# Patient Record
Sex: MALE | Race: WHITE | NOT HISPANIC OR LATINO | Employment: FULL TIME | URBAN - METROPOLITAN AREA
[De-identification: names, ages, dates, MRNs, and addresses within clinical notes are randomized per-mention and may not be internally consistent; named-entity substitution may affect disease eponyms.]

---

## 2017-01-16 ENCOUNTER — HOSPITAL ENCOUNTER (OUTPATIENT)
Dept: LAB | Facility: MEDICAL CENTER | Age: 52
End: 2017-01-16
Attending: NURSE PRACTITIONER
Payer: COMMERCIAL

## 2017-01-16 LAB
ALBUMIN SERPL BCP-MCNC: 4.5 G/DL (ref 3.2–4.9)
ALBUMIN/GLOB SERPL: 1.3 G/DL
ALP SERPL-CCNC: 67 U/L (ref 30–99)
ALT SERPL-CCNC: 198 U/L (ref 2–50)
ANION GAP SERPL CALC-SCNC: 5 MMOL/L (ref 0–11.9)
AST SERPL-CCNC: 107 U/L (ref 12–45)
BASOPHILS # BLD AUTO: 0.05 K/UL (ref 0–0.12)
BASOPHILS NFR BLD AUTO: 0.6 % (ref 0–1.8)
BILIRUB SERPL-MCNC: 0.4 MG/DL (ref 0.1–1.5)
BUN SERPL-MCNC: 15 MG/DL (ref 8–22)
CALCIUM SERPL-MCNC: 9.7 MG/DL (ref 8.5–10.5)
CHLORIDE SERPL-SCNC: 105 MMOL/L (ref 96–112)
CHOLEST SERPL-MCNC: 235 MG/DL (ref 100–199)
CO2 SERPL-SCNC: 29 MMOL/L (ref 20–33)
CREAT SERPL-MCNC: 0.97 MG/DL (ref 0.5–1.4)
CRP SERPL HS-MCNC: 12.2 MG/L (ref 0–7.5)
EOSINOPHIL # BLD: 0.15 K/UL (ref 0–0.51)
EOSINOPHIL NFR BLD AUTO: 1.9 % (ref 0–6.9)
ERYTHROCYTE [DISTWIDTH] IN BLOOD BY AUTOMATED COUNT: 43 FL (ref 35.9–50)
GLOBULIN SER CALC-MCNC: 3.4 G/DL (ref 1.9–3.5)
GLUCOSE SERPL-MCNC: 160 MG/DL (ref 65–99)
HCT VFR BLD AUTO: 49.6 % (ref 42–52)
HDLC SERPL-MCNC: 48 MG/DL
HGB BLD-MCNC: 16.3 G/DL (ref 14–18)
IMM GRANULOCYTES # BLD AUTO: 0.03 K/UL (ref 0–0.11)
IMM GRANULOCYTES NFR BLD AUTO: 0.4 % (ref 0–0.9)
LDLC SERPL CALC-MCNC: 161 MG/DL
LYMPHOCYTES # BLD: 1.65 K/UL (ref 1–4.8)
LYMPHOCYTES NFR BLD AUTO: 20.4 % (ref 22–41)
MCH RBC QN AUTO: 30.5 PG (ref 27–33)
MCHC RBC AUTO-ENTMCNC: 32.9 G/DL (ref 33.7–35.3)
MCV RBC AUTO: 92.7 FL (ref 81.4–97.8)
MONOCYTES # BLD: 0.63 K/UL (ref 0–0.85)
MONOCYTES NFR BLD AUTO: 7.8 % (ref 0–13.4)
NEUTROPHILS # BLD: 5.56 K/UL (ref 1.82–7.42)
NEUTROPHILS NFR BLD AUTO: 68.9 % (ref 44–72)
NRBC # BLD AUTO: 0 K/UL
NRBC BLD-RTO: 0 /100 WBC
PLATELET # BLD AUTO: 246 K/UL (ref 164–446)
PMV BLD AUTO: 11 FL (ref 9–12.9)
POTASSIUM SERPL-SCNC: 4.7 MMOL/L (ref 3.6–5.5)
PROT SERPL-MCNC: 7.9 G/DL (ref 6–8.2)
PSA SERPL DL<=0.01 NG/ML-MCNC: 0.43 NG/ML (ref 0–4)
RBC # BLD AUTO: 5.35 M/UL (ref 4.7–6.1)
SODIUM SERPL-SCNC: 139 MMOL/L (ref 135–145)
TRIGL SERPL-MCNC: 131 MG/DL (ref 0–149)
WBC # BLD AUTO: 8.1 K/UL (ref 4.8–10.8)

## 2017-01-16 PROCEDURE — 86141 C-REACTIVE PROTEIN HS: CPT

## 2017-01-16 PROCEDURE — 80061 LIPID PANEL: CPT

## 2017-01-16 PROCEDURE — 80053 COMPREHEN METABOLIC PANEL: CPT

## 2017-01-16 PROCEDURE — 84153 ASSAY OF PSA TOTAL: CPT

## 2017-01-16 PROCEDURE — 85025 COMPLETE CBC W/AUTO DIFF WBC: CPT

## 2017-01-16 PROCEDURE — 36415 COLL VENOUS BLD VENIPUNCTURE: CPT

## 2017-02-13 ENCOUNTER — HOSPITAL ENCOUNTER (OUTPATIENT)
Dept: RADIOLOGY | Facility: MEDICAL CENTER | Age: 52
End: 2017-02-13
Attending: NURSE PRACTITIONER
Payer: COMMERCIAL

## 2017-02-13 DIAGNOSIS — R74.8 ELEVATED LIVER ENZYMES: ICD-10-CM

## 2017-02-13 PROCEDURE — 76700 US EXAM ABDOM COMPLETE: CPT

## 2017-02-15 ENCOUNTER — APPOINTMENT (OUTPATIENT)
Dept: SLEEP MEDICINE | Facility: MEDICAL CENTER | Age: 52
End: 2017-02-15
Payer: COMMERCIAL

## 2017-04-10 ENCOUNTER — APPOINTMENT (OUTPATIENT)
Dept: SLEEP MEDICINE | Facility: MEDICAL CENTER | Age: 52
End: 2017-04-10
Payer: COMMERCIAL

## 2017-04-27 ENCOUNTER — HOSPITAL ENCOUNTER (OUTPATIENT)
Dept: LAB | Facility: MEDICAL CENTER | Age: 52
End: 2017-04-27
Attending: NURSE PRACTITIONER
Payer: COMMERCIAL

## 2017-04-27 LAB
ALBUMIN SERPL BCP-MCNC: 4.4 G/DL (ref 3.2–4.9)
ALBUMIN/GLOB SERPL: 1.5 G/DL
ALP SERPL-CCNC: 62 U/L (ref 30–99)
ALT SERPL-CCNC: 26 U/L (ref 2–50)
ANION GAP SERPL CALC-SCNC: 9 MMOL/L (ref 0–11.9)
AST SERPL-CCNC: 20 U/L (ref 12–45)
BILIRUB SERPL-MCNC: 0.5 MG/DL (ref 0.1–1.5)
BUN SERPL-MCNC: 15 MG/DL (ref 8–22)
CALCIUM SERPL-MCNC: 9.5 MG/DL (ref 8.5–10.5)
CHLORIDE SERPL-SCNC: 105 MMOL/L (ref 96–112)
CHOLEST SERPL-MCNC: 167 MG/DL (ref 100–199)
CO2 SERPL-SCNC: 25 MMOL/L (ref 20–33)
CREAT SERPL-MCNC: 0.94 MG/DL (ref 0.5–1.4)
CRP SERPL HS-MCNC: 6.7 MG/L (ref 0–7.5)
GFR SERPL CREATININE-BSD FRML MDRD: >60 ML/MIN/1.73 M 2
GLOBULIN SER CALC-MCNC: 3 G/DL (ref 1.9–3.5)
GLUCOSE SERPL-MCNC: 84 MG/DL (ref 65–99)
HDLC SERPL-MCNC: 41 MG/DL
LDLC SERPL CALC-MCNC: 105 MG/DL
POTASSIUM SERPL-SCNC: 4.3 MMOL/L (ref 3.6–5.5)
PROT SERPL-MCNC: 7.4 G/DL (ref 6–8.2)
SODIUM SERPL-SCNC: 139 MMOL/L (ref 135–145)
TRIGL SERPL-MCNC: 104 MG/DL (ref 0–149)

## 2017-04-27 PROCEDURE — 80053 COMPREHEN METABOLIC PANEL: CPT

## 2017-04-27 PROCEDURE — 86141 C-REACTIVE PROTEIN HS: CPT

## 2017-04-27 PROCEDURE — 36415 COLL VENOUS BLD VENIPUNCTURE: CPT

## 2017-04-27 PROCEDURE — 80061 LIPID PANEL: CPT

## 2018-11-20 ENCOUNTER — APPOINTMENT (RX ONLY)
Dept: URBAN - METROPOLITAN AREA CLINIC 22 | Facility: CLINIC | Age: 53
Setting detail: DERMATOLOGY
End: 2018-11-20

## 2018-11-20 DIAGNOSIS — D18.0 HEMANGIOMA: ICD-10-CM

## 2018-11-20 DIAGNOSIS — Q819 OTHER SPECIFIED ANOMALIES OF SKIN: ICD-10-CM

## 2018-11-20 DIAGNOSIS — L81.4 OTHER MELANIN HYPERPIGMENTATION: ICD-10-CM

## 2018-11-20 DIAGNOSIS — Z71.89 OTHER SPECIFIED COUNSELING: ICD-10-CM

## 2018-11-20 DIAGNOSIS — Q828 OTHER SPECIFIED ANOMALIES OF SKIN: ICD-10-CM

## 2018-11-20 DIAGNOSIS — L91.8 OTHER HYPERTROPHIC DISORDERS OF THE SKIN: ICD-10-CM

## 2018-11-20 DIAGNOSIS — D22 MELANOCYTIC NEVI: ICD-10-CM

## 2018-11-20 DIAGNOSIS — Q826 OTHER SPECIFIED ANOMALIES OF SKIN: ICD-10-CM

## 2018-11-20 DIAGNOSIS — L82.1 OTHER SEBORRHEIC KERATOSIS: ICD-10-CM

## 2018-11-20 PROBLEM — D22.39 MELANOCYTIC NEVI OF OTHER PARTS OF FACE: Status: ACTIVE | Noted: 2018-11-20

## 2018-11-20 PROBLEM — Q82.8 OTHER SPECIFIED CONGENITAL MALFORMATIONS OF SKIN: Status: ACTIVE | Noted: 2018-11-20

## 2018-11-20 PROBLEM — D22.5 MELANOCYTIC NEVI OF TRUNK: Status: ACTIVE | Noted: 2018-11-20

## 2018-11-20 PROBLEM — D48.5 NEOPLASM OF UNCERTAIN BEHAVIOR OF SKIN: Status: ACTIVE | Noted: 2018-11-20

## 2018-11-20 PROBLEM — D22.61 MELANOCYTIC NEVI OF RIGHT UPPER LIMB, INCLUDING SHOULDER: Status: ACTIVE | Noted: 2018-11-20

## 2018-11-20 PROBLEM — D18.01 HEMANGIOMA OF SKIN AND SUBCUTANEOUS TISSUE: Status: ACTIVE | Noted: 2018-11-20

## 2018-11-20 PROBLEM — D22.62 MELANOCYTIC NEVI OF LEFT UPPER LIMB, INCLUDING SHOULDER: Status: ACTIVE | Noted: 2018-11-20

## 2018-11-20 PROCEDURE — 11100: CPT

## 2018-11-20 PROCEDURE — ? BIOPSY BY SHAVE METHOD

## 2018-11-20 PROCEDURE — ? COUNSELING

## 2018-11-20 PROCEDURE — 99214 OFFICE O/P EST MOD 30 MIN: CPT | Mod: 25

## 2018-11-20 PROCEDURE — ? TREATMENT REGIMEN

## 2018-11-20 ASSESSMENT — LOCATION DETAILED DESCRIPTION DERM
LOCATION DETAILED: RIGHT MID-UPPER BACK
LOCATION DETAILED: RIGHT INFERIOR LATERAL UPPER BACK
LOCATION DETAILED: LEFT CENTRAL MALAR CHEEK
LOCATION DETAILED: LEFT DISTAL POSTERIOR THIGH
LOCATION DETAILED: LEFT PROXIMAL POSTERIOR UPPER ARM
LOCATION DETAILED: LEFT INFERIOR MEDIAL MIDBACK
LOCATION DETAILED: RIGHT MEDIAL UPPER BACK
LOCATION DETAILED: RIGHT DISTAL POSTERIOR UPPER ARM
LOCATION DETAILED: LEFT MEDIAL INFERIOR CHEST
LOCATION DETAILED: RIGHT SUPERIOR MEDIAL UPPER BACK
LOCATION DETAILED: RIGHT PROXIMAL POSTERIOR UPPER ARM
LOCATION DETAILED: LEFT DISTAL POSTERIOR UPPER ARM
LOCATION DETAILED: RIGHT SUPERIOR ANTERIOR NECK
LOCATION DETAILED: LEFT INFERIOR LATERAL UPPER BACK
LOCATION DETAILED: RIGHT DISTAL POSTERIOR THIGH
LOCATION DETAILED: LEFT INFERIOR MEDIAL FOREHEAD

## 2018-11-20 ASSESSMENT — LOCATION SIMPLE DESCRIPTION DERM
LOCATION SIMPLE: CHEST
LOCATION SIMPLE: LEFT FOREHEAD
LOCATION SIMPLE: LEFT LOWER BACK
LOCATION SIMPLE: LEFT UPPER ARM
LOCATION SIMPLE: LEFT CHEEK
LOCATION SIMPLE: RIGHT UPPER ARM
LOCATION SIMPLE: RIGHT POSTERIOR THIGH
LOCATION SIMPLE: LEFT POSTERIOR THIGH
LOCATION SIMPLE: RIGHT UPPER BACK
LOCATION SIMPLE: LEFT UPPER BACK
LOCATION SIMPLE: RIGHT ANTERIOR NECK

## 2018-11-20 ASSESSMENT — LOCATION ZONE DERM
LOCATION ZONE: ARM
LOCATION ZONE: LEG
LOCATION ZONE: FACE
LOCATION ZONE: TRUNK
LOCATION ZONE: NECK

## 2018-11-20 NOTE — PROCEDURE: BIOPSY BY SHAVE METHOD
X Size Of Lesion In Cm: 0
Bill 10727 For Specimen Handling/Conveyance To Laboratory?: no
Electrodesiccation Text: The wound bed was treated with electrodesiccation after the biopsy was performed.
Depth Of Biopsy: dermis
Biopsy Type: H and E
Wound Care: Vaseline
Render Post-Care Instructions In Note?: yes
Anesthesia Volume In Cc: 1
Dressing: bandage
Type Of Destruction Used: Curettage
Electrodesiccation And Curettage Text: The wound bed was treated with electrodesiccation and curettage after the biopsy was performed.
Billing Type: Third-Party Bill
Detail Level: Detailed
Biopsy Method: Personna blade
Curettage Text: The wound bed was treated with curettage after the biopsy was performed.
Notification Instructions: Patient will be notified of biopsy results. However, patient instructed to call the office if not contacted within 2 weeks.
Silver Nitrate Text: The wound bed was treated with silver nitrate after the biopsy was performed.
Post-Care Instructions: I reviewed with the patient in detail post-care instructions. Patient is to keep the biopsy site dry overnight, and then apply bacitracin twice daily until healed. Patient may apply hydrogen peroxide soaks to remove any crusting.
Hemostasis: Drysol
Cryotherapy Text: The wound bed was treated with cryotherapy after the biopsy was performed.
Anesthesia Type: 1% lidocaine with 1:100,000 epinephrine
Lab Facility: 
Consent: Written consent was obtained and risks were reviewed including but not limited to scarring, infection, bleeding, scabbing, incomplete removal, nerve damage and allergy to anesthesia.
Lab: [id number]

## 2020-11-18 ENCOUNTER — HOSPITAL ENCOUNTER (OUTPATIENT)
Dept: LAB | Facility: MEDICAL CENTER | Age: 55
End: 2020-11-18
Attending: FAMILY MEDICINE
Payer: COMMERCIAL

## 2020-11-18 PROCEDURE — C9803 HOPD COVID-19 SPEC COLLECT: HCPCS

## 2020-11-18 PROCEDURE — U0003 INFECTIOUS AGENT DETECTION BY NUCLEIC ACID (DNA OR RNA); SEVERE ACUTE RESPIRATORY SYNDROME CORONAVIRUS 2 (SARS-COV-2) (CORONAVIRUS DISEASE [COVID-19]), AMPLIFIED PROBE TECHNIQUE, MAKING USE OF HIGH THROUGHPUT TECHNOLOGIES AS DESCRIBED BY CMS-2020-01-R: HCPCS

## 2020-11-19 LAB
COVID ORDER STATUS COVID19: NORMAL
SARS-COV-2 RNA RESP QL NAA+PROBE: DETECTED
SPECIMEN SOURCE: ABNORMAL

## 2021-12-02 ENCOUNTER — OFFICE VISIT (OUTPATIENT)
Dept: URGENT CARE | Facility: PHYSICIAN GROUP | Age: 56
End: 2021-12-02
Payer: COMMERCIAL

## 2021-12-02 VITALS
BODY MASS INDEX: 28.99 KG/M2 | RESPIRATION RATE: 16 BRPM | WEIGHT: 214 LBS | HEIGHT: 72 IN | OXYGEN SATURATION: 95 % | DIASTOLIC BLOOD PRESSURE: 92 MMHG | SYSTOLIC BLOOD PRESSURE: 164 MMHG | HEART RATE: 99 BPM | TEMPERATURE: 98.1 F

## 2021-12-02 DIAGNOSIS — R04.0 EPISTAXIS: ICD-10-CM

## 2021-12-02 DIAGNOSIS — R03.0 ELEVATED BLOOD PRESSURE READING WITHOUT DIAGNOSIS OF HYPERTENSION: ICD-10-CM

## 2021-12-02 PROCEDURE — 99203 OFFICE O/P NEW LOW 30 MIN: CPT | Performed by: NURSE PRACTITIONER

## 2021-12-02 ASSESSMENT — ENCOUNTER SYMPTOMS
HEADACHES: 0
CHILLS: 0
DIZZINESS: 0
MYALGIAS: 0
FEVER: 0
NAUSEA: 0

## 2021-12-03 NOTE — PROGRESS NOTES
Subjective     Harjit Pantoja is a 56 y.o. male who presents with Epistaxis (Four bloddy noses in the last 24 hours. Pt sts his wife recently switched over to Alkolol. )            HPI   New problem.  Patient is a 56-year-old male presents with for bloody noses in the past 24 hours.  He currently does not have any active bleeding.  He denies nasal congestion, runny nose, headache, or dizziness.  He reports that it only takes a couple of minutes to stop the bleeding.  He is just recently switched to a different nasal spray that has all-natural ingredients to include essential oils.    Cleocin [clindamycin hcl] and Pcn [penicillins]  No current outpatient medications on file prior to visit.     No current facility-administered medications on file prior to visit.     Social History     Socioeconomic History   • Marital status:      Spouse name: Not on file   • Number of children: Not on file   • Years of education: Not on file   • Highest education level: Not on file   Occupational History   • Not on file   Tobacco Use   • Smoking status: Current Every Day Smoker     Packs/day: 1.00     Years: 10.00     Pack years: 10.00     Types: Cigarettes   • Smokeless tobacco: Current User   Vaping Use   • Vaping Use: Former   • Quit date: 12/2/2019   Substance and Sexual Activity   • Alcohol use: Yes     Alcohol/week: 2.4 oz     Types: 4 Standard drinks or equivalent per week   • Drug use: Yes     Types: Marijuana, Oral     Comment: one every two weeks.    • Sexual activity: Not on file   Other Topics Concern   • Not on file   Social History Narrative   • Not on file     Social Determinants of Health     Financial Resource Strain:    • Difficulty of Paying Living Expenses: Not on file   Food Insecurity:    • Worried About Running Out of Food in the Last Year: Not on file   • Ran Out of Food in the Last Year: Not on file   Transportation Needs:    • Lack of Transportation (Medical): Not on file   • Lack of  Transportation (Non-Medical): Not on file   Physical Activity:    • Days of Exercise per Week: Not on file   • Minutes of Exercise per Session: Not on file   Stress:    • Feeling of Stress : Not on file   Social Connections:    • Frequency of Communication with Friends and Family: Not on file   • Frequency of Social Gatherings with Friends and Family: Not on file   • Attends [employer] Services: Not on file   • Active Member of Clubs or Organizations: Not on file   • Attends Club or Organization Meetings: Not on file   • Marital Status: Not on file   Intimate Partner Violence:    • Fear of Current or Ex-Partner: Not on file   • Emotionally Abused: Not on file   • Physically Abused: Not on file   • Sexually Abused: Not on file   Housing Stability:    • Unable to Pay for Housing in the Last Year: Not on file   • Number of Places Lived in the Last Year: Not on file   • Unstable Housing in the Last Year: Not on file     Breast Cancer-related family history is not on file.      Review of Systems   Constitutional: Negative for chills and fever.   HENT: Positive for nosebleeds. Negative for congestion.    Gastrointestinal: Negative for nausea.   Musculoskeletal: Negative for myalgias.   Neurological: Negative for dizziness and headaches.              Objective     BP (!) 164/92 (BP Location: Left arm, Patient Position: Sitting, BP Cuff Size: Large adult)   Pulse 99   Temp 36.7 °C (98.1 °F) (Temporal)   Resp 16   Ht 1.829 m (6')   Wt 97.1 kg (214 lb)   SpO2 95%   BMI 29.02 kg/m²      Physical Exam  Vitals and nursing note reviewed.   Constitutional:       General: He is not in acute distress.     Appearance: He is well-developed.   HENT:      Head: Normocephalic and atraumatic.      Right Ear: Ear canal and external ear normal. No middle ear effusion. Tympanic membrane is not injected or perforated.      Left Ear: Ear canal and external ear normal.  No middle ear effusion. Tympanic membrane is not injected or  perforated.      Nose: Mucosal edema present.      Comments: Patient with bloody discharge in the right nares  Eyes:      General:         Right eye: No discharge.         Left eye: No discharge.      Conjunctiva/sclera: Conjunctivae normal.   Cardiovascular:      Rate and Rhythm: Normal rate and regular rhythm.      Heart sounds: Normal heart sounds. No murmur heard.      Pulmonary:      Effort: Pulmonary effort is normal. No respiratory distress.      Breath sounds: Normal breath sounds.   Chest:   Breasts:      Right: No supraclavicular adenopathy.      Left: No supraclavicular adenopathy.       Musculoskeletal:         General: Normal range of motion.      Cervical back: Normal range of motion and neck supple.      Comments: Normal movement of all 4 extremities.   Lymphadenopathy:      Cervical: No cervical adenopathy.      Upper Body:      Right upper body: No supraclavicular adenopathy.      Left upper body: No supraclavicular adenopathy.   Skin:     General: Skin is warm and dry.   Neurological:      Mental Status: He is alert and oriented to person, place, and time.      Gait: Gait normal.   Psychiatric:         Behavior: Behavior normal.         Thought Content: Thought content normal.                             Assessment & Plan         1. Epistaxis  Referral to ENT   2. Elevated blood pressure reading without diagnosis of hypertension         Advised patient to stop the new nasal spray to see if this makes a difference.  He is given a referral to ENT in case he has something that needs to be cauterized.  He is counseled to watch his blood pressure as he is noted to have elevation at 164/92 today in the clinic.

## 2022-06-09 ENCOUNTER — HOSPITAL ENCOUNTER (OUTPATIENT)
Dept: LAB | Facility: MEDICAL CENTER | Age: 57
End: 2022-06-09
Attending: STUDENT IN AN ORGANIZED HEALTH CARE EDUCATION/TRAINING PROGRAM
Payer: COMMERCIAL

## 2022-06-09 LAB
25(OH)D3 SERPL-MCNC: 28 NG/ML (ref 30–100)
ALBUMIN SERPL BCP-MCNC: 4.7 G/DL (ref 3.2–4.9)
ALBUMIN/GLOB SERPL: 1.8 G/DL
ALP SERPL-CCNC: 67 U/L (ref 30–99)
ALT SERPL-CCNC: 21 U/L (ref 2–50)
ANION GAP SERPL CALC-SCNC: 13 MMOL/L (ref 7–16)
AST SERPL-CCNC: 18 U/L (ref 12–45)
BASOPHILS # BLD AUTO: 0.4 % (ref 0–1.8)
BASOPHILS # BLD: 0.03 K/UL (ref 0–0.12)
BILIRUB SERPL-MCNC: 0.3 MG/DL (ref 0.1–1.5)
BUN SERPL-MCNC: 15 MG/DL (ref 8–22)
CALCIUM SERPL-MCNC: 9.9 MG/DL (ref 8.5–10.5)
CHLORIDE SERPL-SCNC: 103 MMOL/L (ref 96–112)
CHOLEST SERPL-MCNC: 124 MG/DL (ref 100–199)
CO2 SERPL-SCNC: 24 MMOL/L (ref 20–33)
CREAT SERPL-MCNC: 0.81 MG/DL (ref 0.5–1.4)
EOSINOPHIL # BLD AUTO: 0.06 K/UL (ref 0–0.51)
EOSINOPHIL NFR BLD: 0.7 % (ref 0–6.9)
ERYTHROCYTE [DISTWIDTH] IN BLOOD BY AUTOMATED COUNT: 43.3 FL (ref 35.9–50)
EST. AVERAGE GLUCOSE BLD GHB EST-MCNC: 157 MG/DL
FASTING STATUS PATIENT QL REPORTED: NORMAL
GFR SERPLBLD CREATININE-BSD FMLA CKD-EPI: 103 ML/MIN/1.73 M 2
GLOBULIN SER CALC-MCNC: 2.6 G/DL (ref 1.9–3.5)
GLUCOSE SERPL-MCNC: 125 MG/DL (ref 65–99)
HBA1C MFR BLD: 7.1 % (ref 4–5.6)
HCT VFR BLD AUTO: 48.7 % (ref 42–52)
HDLC SERPL-MCNC: 70 MG/DL
HGB BLD-MCNC: 17 G/DL (ref 14–18)
IMM GRANULOCYTES # BLD AUTO: 0.03 K/UL (ref 0–0.11)
IMM GRANULOCYTES NFR BLD AUTO: 0.4 % (ref 0–0.9)
LDLC SERPL CALC-MCNC: 45 MG/DL
LYMPHOCYTES # BLD AUTO: 1.44 K/UL (ref 1–4.8)
LYMPHOCYTES NFR BLD: 17.1 % (ref 22–41)
MCH RBC QN AUTO: 32 PG (ref 27–33)
MCHC RBC AUTO-ENTMCNC: 34.9 G/DL (ref 33.7–35.3)
MCV RBC AUTO: 91.5 FL (ref 81.4–97.8)
MONOCYTES # BLD AUTO: 0.52 K/UL (ref 0–0.85)
MONOCYTES NFR BLD AUTO: 6.2 % (ref 0–13.4)
NEUTROPHILS # BLD AUTO: 6.32 K/UL (ref 1.82–7.42)
NEUTROPHILS NFR BLD: 75.2 % (ref 44–72)
NRBC # BLD AUTO: 0 K/UL
NRBC BLD-RTO: 0 /100 WBC
PLATELET # BLD AUTO: 198 K/UL (ref 164–446)
PMV BLD AUTO: 10.5 FL (ref 9–12.9)
POTASSIUM SERPL-SCNC: 4.5 MMOL/L (ref 3.6–5.5)
PROT SERPL-MCNC: 7.3 G/DL (ref 6–8.2)
RBC # BLD AUTO: 5.32 M/UL (ref 4.7–6.1)
SODIUM SERPL-SCNC: 140 MMOL/L (ref 135–145)
TRIGL SERPL-MCNC: 43 MG/DL (ref 0–149)
WBC # BLD AUTO: 8.4 K/UL (ref 4.8–10.8)

## 2022-06-09 PROCEDURE — 80061 LIPID PANEL: CPT

## 2022-06-09 PROCEDURE — 85025 COMPLETE CBC W/AUTO DIFF WBC: CPT

## 2022-06-09 PROCEDURE — 82306 VITAMIN D 25 HYDROXY: CPT

## 2022-06-09 PROCEDURE — 83036 HEMOGLOBIN GLYCOSYLATED A1C: CPT

## 2022-06-09 PROCEDURE — 80053 COMPREHEN METABOLIC PANEL: CPT

## 2022-06-09 PROCEDURE — 36415 COLL VENOUS BLD VENIPUNCTURE: CPT

## 2022-12-08 ENCOUNTER — HOSPITAL ENCOUNTER (OUTPATIENT)
Dept: LAB | Facility: MEDICAL CENTER | Age: 57
End: 2022-12-08
Attending: STUDENT IN AN ORGANIZED HEALTH CARE EDUCATION/TRAINING PROGRAM
Payer: COMMERCIAL

## 2022-12-08 LAB
25(OH)D3 SERPL-MCNC: 41 NG/ML (ref 30–100)
ALBUMIN SERPL BCP-MCNC: 4.9 G/DL (ref 3.2–4.9)
ALBUMIN/GLOB SERPL: 1.8 G/DL
ALP SERPL-CCNC: 68 U/L (ref 30–99)
ALT SERPL-CCNC: 27 U/L (ref 2–50)
ANION GAP SERPL CALC-SCNC: 12 MMOL/L (ref 7–16)
AST SERPL-CCNC: 19 U/L (ref 12–45)
BASOPHILS # BLD AUTO: 0.5 % (ref 0–1.8)
BASOPHILS # BLD: 0.03 K/UL (ref 0–0.12)
BILIRUB SERPL-MCNC: 0.5 MG/DL (ref 0.1–1.5)
BUN SERPL-MCNC: 14 MG/DL (ref 8–22)
CALCIUM SERPL-MCNC: 9.7 MG/DL (ref 8.5–10.5)
CHLORIDE SERPL-SCNC: 103 MMOL/L (ref 96–112)
CHOLEST SERPL-MCNC: 116 MG/DL (ref 100–199)
CO2 SERPL-SCNC: 26 MMOL/L (ref 20–33)
CREAT SERPL-MCNC: 0.8 MG/DL (ref 0.5–1.4)
EOSINOPHIL # BLD AUTO: 0.04 K/UL (ref 0–0.51)
EOSINOPHIL NFR BLD: 0.6 % (ref 0–6.9)
ERYTHROCYTE [DISTWIDTH] IN BLOOD BY AUTOMATED COUNT: 44.6 FL (ref 35.9–50)
EST. AVERAGE GLUCOSE BLD GHB EST-MCNC: 123 MG/DL
FASTING STATUS PATIENT QL REPORTED: NORMAL
GFR SERPLBLD CREATININE-BSD FMLA CKD-EPI: 103 ML/MIN/1.73 M 2
GLOBULIN SER CALC-MCNC: 2.7 G/DL (ref 1.9–3.5)
GLUCOSE SERPL-MCNC: 106 MG/DL (ref 65–99)
HBA1C MFR BLD: 5.9 % (ref 4–5.6)
HCT VFR BLD AUTO: 47.9 % (ref 42–52)
HDLC SERPL-MCNC: 69 MG/DL
HGB BLD-MCNC: 16.4 G/DL (ref 14–18)
IMM GRANULOCYTES # BLD AUTO: 0.02 K/UL (ref 0–0.11)
IMM GRANULOCYTES NFR BLD AUTO: 0.3 % (ref 0–0.9)
LDLC SERPL CALC-MCNC: 35 MG/DL
LYMPHOCYTES # BLD AUTO: 1.64 K/UL (ref 1–4.8)
LYMPHOCYTES NFR BLD: 25 % (ref 22–41)
MCH RBC QN AUTO: 31.9 PG (ref 27–33)
MCHC RBC AUTO-ENTMCNC: 34.2 G/DL (ref 33.7–35.3)
MCV RBC AUTO: 93.2 FL (ref 81.4–97.8)
MONOCYTES # BLD AUTO: 0.66 K/UL (ref 0–0.85)
MONOCYTES NFR BLD AUTO: 10.1 % (ref 0–13.4)
NEUTROPHILS # BLD AUTO: 4.17 K/UL (ref 1.82–7.42)
NEUTROPHILS NFR BLD: 63.5 % (ref 44–72)
NRBC # BLD AUTO: 0 K/UL
NRBC BLD-RTO: 0 /100 WBC
PLATELET # BLD AUTO: 201 K/UL (ref 164–446)
PMV BLD AUTO: 10.6 FL (ref 9–12.9)
POTASSIUM SERPL-SCNC: 4.4 MMOL/L (ref 3.6–5.5)
PROT SERPL-MCNC: 7.6 G/DL (ref 6–8.2)
RBC # BLD AUTO: 5.14 M/UL (ref 4.7–6.1)
SODIUM SERPL-SCNC: 141 MMOL/L (ref 135–145)
TRIGL SERPL-MCNC: 58 MG/DL (ref 0–149)
WBC # BLD AUTO: 6.6 K/UL (ref 4.8–10.8)

## 2022-12-08 PROCEDURE — 80053 COMPREHEN METABOLIC PANEL: CPT

## 2022-12-08 PROCEDURE — 85025 COMPLETE CBC W/AUTO DIFF WBC: CPT

## 2022-12-08 PROCEDURE — 80061 LIPID PANEL: CPT

## 2022-12-08 PROCEDURE — 82306 VITAMIN D 25 HYDROXY: CPT

## 2022-12-08 PROCEDURE — 83036 HEMOGLOBIN GLYCOSYLATED A1C: CPT

## 2022-12-08 PROCEDURE — 36415 COLL VENOUS BLD VENIPUNCTURE: CPT

## 2023-10-16 ENCOUNTER — APPOINTMENT (OUTPATIENT)
Dept: RADIOLOGY | Facility: MEDICAL CENTER | Age: 58
DRG: 291 | End: 2023-10-16
Attending: STUDENT IN AN ORGANIZED HEALTH CARE EDUCATION/TRAINING PROGRAM
Payer: COMMERCIAL

## 2023-10-16 ENCOUNTER — HOSPITAL ENCOUNTER (INPATIENT)
Facility: MEDICAL CENTER | Age: 58
LOS: 6 days | DRG: 291 | End: 2023-10-22
Attending: STUDENT IN AN ORGANIZED HEALTH CARE EDUCATION/TRAINING PROGRAM | Admitting: FAMILY MEDICINE
Payer: COMMERCIAL

## 2023-10-16 DIAGNOSIS — N18.9 CHRONIC KIDNEY DISEASE, UNSPECIFIED CKD STAGE: ICD-10-CM

## 2023-10-16 DIAGNOSIS — E87.70 HYPERVOLEMIA, UNSPECIFIED HYPERVOLEMIA TYPE: ICD-10-CM

## 2023-10-16 DIAGNOSIS — R06.83 SNORING: ICD-10-CM

## 2023-10-16 DIAGNOSIS — I48.19 PERSISTENT ATRIAL FIBRILLATION (HCC): ICD-10-CM

## 2023-10-16 DIAGNOSIS — I48.91 ATRIAL FIBRILLATION, UNSPECIFIED TYPE (HCC): ICD-10-CM

## 2023-10-16 PROBLEM — Z87.898 HAS RECENTLY QUIT ALCOHOL USE: Status: ACTIVE | Noted: 2023-10-16

## 2023-10-16 LAB
ALBUMIN SERPL BCP-MCNC: 4.1 G/DL (ref 3.2–4.9)
ALBUMIN/GLOB SERPL: 1.8 G/DL
ALP SERPL-CCNC: 53 U/L (ref 30–99)
ALT SERPL-CCNC: 40 U/L (ref 2–50)
ANION GAP SERPL CALC-SCNC: 10 MMOL/L (ref 7–16)
AST SERPL-CCNC: 25 U/L (ref 12–45)
BASOPHILS # BLD AUTO: 0.5 % (ref 0–1.8)
BASOPHILS # BLD: 0.04 K/UL (ref 0–0.12)
BILIRUB SERPL-MCNC: 0.6 MG/DL (ref 0.1–1.5)
BUN SERPL-MCNC: 19 MG/DL (ref 8–22)
CALCIUM ALBUM COR SERPL-MCNC: 9.1 MG/DL (ref 8.5–10.5)
CALCIUM SERPL-MCNC: 9.2 MG/DL (ref 8.5–10.5)
CHLORIDE SERPL-SCNC: 106 MMOL/L (ref 96–112)
CO2 SERPL-SCNC: 23 MMOL/L (ref 20–33)
CREAT SERPL-MCNC: 0.96 MG/DL (ref 0.5–1.4)
EKG IMPRESSION: NORMAL
EOSINOPHIL # BLD AUTO: 0.05 K/UL (ref 0–0.51)
EOSINOPHIL NFR BLD: 0.6 % (ref 0–6.9)
ERYTHROCYTE [DISTWIDTH] IN BLOOD BY AUTOMATED COUNT: 44.8 FL (ref 35.9–50)
GFR SERPLBLD CREATININE-BSD FMLA CKD-EPI: 91 ML/MIN/1.73 M 2
GLOBULIN SER CALC-MCNC: 2.3 G/DL (ref 1.9–3.5)
GLUCOSE SERPL-MCNC: 108 MG/DL (ref 65–99)
HCT VFR BLD AUTO: 44.6 % (ref 42–52)
HGB BLD-MCNC: 14.4 G/DL (ref 14–18)
IMM GRANULOCYTES # BLD AUTO: 0.03 K/UL (ref 0–0.11)
IMM GRANULOCYTES NFR BLD AUTO: 0.4 % (ref 0–0.9)
LYMPHOCYTES # BLD AUTO: 2.22 K/UL (ref 1–4.8)
LYMPHOCYTES NFR BLD: 26.2 % (ref 22–41)
MCH RBC QN AUTO: 30.8 PG (ref 27–33)
MCHC RBC AUTO-ENTMCNC: 32.3 G/DL (ref 32.3–36.5)
MCV RBC AUTO: 95.3 FL (ref 81.4–97.8)
MONOCYTES # BLD AUTO: 0.58 K/UL (ref 0–0.85)
MONOCYTES NFR BLD AUTO: 6.8 % (ref 0–13.4)
NEUTROPHILS # BLD AUTO: 5.56 K/UL (ref 1.82–7.42)
NEUTROPHILS NFR BLD: 65.5 % (ref 44–72)
NRBC # BLD AUTO: 0 K/UL
NRBC BLD-RTO: 0 /100 WBC (ref 0–0.2)
NT-PROBNP SERPL IA-MCNC: 6003 PG/ML (ref 0–125)
PLATELET # BLD AUTO: 233 K/UL (ref 164–446)
PMV BLD AUTO: 10.5 FL (ref 9–12.9)
POTASSIUM SERPL-SCNC: 4.9 MMOL/L (ref 3.6–5.5)
PROT SERPL-MCNC: 6.4 G/DL (ref 6–8.2)
RBC # BLD AUTO: 4.68 M/UL (ref 4.7–6.1)
SODIUM SERPL-SCNC: 139 MMOL/L (ref 135–145)
TROPONIN T SERPL-MCNC: 22 NG/L (ref 6–19)
TROPONIN T SERPL-MCNC: 24 NG/L (ref 6–19)
WBC # BLD AUTO: 8.5 K/UL (ref 4.8–10.8)

## 2023-10-16 PROCEDURE — 84484 ASSAY OF TROPONIN QUANT: CPT

## 2023-10-16 PROCEDURE — 80053 COMPREHEN METABOLIC PANEL: CPT

## 2023-10-16 PROCEDURE — 85025 COMPLETE CBC W/AUTO DIFF WBC: CPT

## 2023-10-16 PROCEDURE — A9270 NON-COVERED ITEM OR SERVICE: HCPCS

## 2023-10-16 PROCEDURE — 770020 HCHG ROOM/CARE - TELE (206)

## 2023-10-16 PROCEDURE — 36415 COLL VENOUS BLD VENIPUNCTURE: CPT

## 2023-10-16 PROCEDURE — 71045 X-RAY EXAM CHEST 1 VIEW: CPT

## 2023-10-16 PROCEDURE — 700111 HCHG RX REV CODE 636 W/ 250 OVERRIDE (IP): Mod: JZ

## 2023-10-16 PROCEDURE — 93005 ELECTROCARDIOGRAM TRACING: CPT | Performed by: STUDENT IN AN ORGANIZED HEALTH CARE EDUCATION/TRAINING PROGRAM

## 2023-10-16 PROCEDURE — 83880 ASSAY OF NATRIURETIC PEPTIDE: CPT

## 2023-10-16 PROCEDURE — 93005 ELECTROCARDIOGRAM TRACING: CPT

## 2023-10-16 PROCEDURE — 99285 EMERGENCY DEPT VISIT HI MDM: CPT

## 2023-10-16 PROCEDURE — 700102 HCHG RX REV CODE 250 W/ 637 OVERRIDE(OP)

## 2023-10-16 RX ORDER — ENOXAPARIN SODIUM 100 MG/ML
40 INJECTION SUBCUTANEOUS DAILY
Status: DISCONTINUED | OUTPATIENT
Start: 2023-10-16 | End: 2023-10-16

## 2023-10-16 RX ORDER — POLYETHYLENE GLYCOL 3350 17 G/17G
1 POWDER, FOR SOLUTION ORAL
Status: DISCONTINUED | OUTPATIENT
Start: 2023-10-16 | End: 2023-10-22 | Stop reason: HOSPADM

## 2023-10-16 RX ORDER — METFORMIN HYDROCHLORIDE 500 MG/1
1000 TABLET, EXTENDED RELEASE ORAL 2 TIMES DAILY
COMMUNITY

## 2023-10-16 RX ORDER — BISACODYL 10 MG
10 SUPPOSITORY, RECTAL RECTAL
Status: DISCONTINUED | OUTPATIENT
Start: 2023-10-16 | End: 2023-10-22 | Stop reason: HOSPADM

## 2023-10-16 RX ORDER — ROSUVASTATIN CALCIUM 5 MG/1
10 TABLET, COATED ORAL DAILY
Status: DISCONTINUED | OUTPATIENT
Start: 2023-10-17 | End: 2023-10-22 | Stop reason: HOSPADM

## 2023-10-16 RX ORDER — LOSARTAN POTASSIUM 50 MG/1
25 TABLET ORAL
Status: DISCONTINUED | OUTPATIENT
Start: 2023-10-17 | End: 2023-10-19

## 2023-10-16 RX ORDER — FUROSEMIDE 10 MG/ML
20 INJECTION INTRAMUSCULAR; INTRAVENOUS
Status: DISCONTINUED | OUTPATIENT
Start: 2023-10-16 | End: 2023-10-18

## 2023-10-16 RX ORDER — AMOXICILLIN 250 MG
2 CAPSULE ORAL 2 TIMES DAILY
Status: DISCONTINUED | OUTPATIENT
Start: 2023-10-16 | End: 2023-10-22 | Stop reason: HOSPADM

## 2023-10-16 RX ORDER — ACETAMINOPHEN 325 MG/1
650 TABLET ORAL EVERY 6 HOURS PRN
Status: DISCONTINUED | OUTPATIENT
Start: 2023-10-16 | End: 2023-10-22 | Stop reason: HOSPADM

## 2023-10-16 RX ORDER — METFORMIN HYDROCHLORIDE 500 MG/1
1000 TABLET, EXTENDED RELEASE ORAL 2 TIMES DAILY
Status: DISCONTINUED | OUTPATIENT
Start: 2023-10-16 | End: 2023-10-22 | Stop reason: HOSPADM

## 2023-10-16 RX ORDER — ROSUVASTATIN CALCIUM 10 MG/1
10 TABLET, COATED ORAL DAILY
COMMUNITY
End: 2024-02-05 | Stop reason: SDUPTHER

## 2023-10-16 RX ORDER — FUROSEMIDE 10 MG/ML
10 INJECTION INTRAMUSCULAR; INTRAVENOUS ONCE
Status: DISCONTINUED | OUTPATIENT
Start: 2023-10-16 | End: 2023-10-16

## 2023-10-16 RX ORDER — METOPROLOL TARTRATE 50 MG/1
50 TABLET, FILM COATED ORAL 2 TIMES DAILY
Status: ON HOLD | COMMUNITY
End: 2023-10-22

## 2023-10-16 RX ORDER — LABETALOL HYDROCHLORIDE 5 MG/ML
10 INJECTION, SOLUTION INTRAVENOUS EVERY 4 HOURS PRN
Status: DISCONTINUED | OUTPATIENT
Start: 2023-10-16 | End: 2023-10-22 | Stop reason: HOSPADM

## 2023-10-16 RX ADMIN — METFORMIN HYDROCHLORIDE 1000 MG: 500 TABLET, EXTENDED RELEASE ORAL at 20:47

## 2023-10-16 RX ADMIN — FUROSEMIDE 20 MG: 10 INJECTION, SOLUTION INTRAVENOUS at 21:22

## 2023-10-16 RX ADMIN — METOPROLOL TARTRATE 50 MG: 25 TABLET, FILM COATED ORAL at 20:46

## 2023-10-16 RX ADMIN — APIXABAN 5 MG: 5 TABLET, FILM COATED ORAL at 20:46

## 2023-10-16 ASSESSMENT — COGNITIVE AND FUNCTIONAL STATUS - GENERAL
SUGGESTED CMS G CODE MODIFIER DAILY ACTIVITY: CH
MOBILITY SCORE: 24
SUGGESTED CMS G CODE MODIFIER MOBILITY: CH
DAILY ACTIVITIY SCORE: 24

## 2023-10-16 ASSESSMENT — LIFESTYLE VARIABLES
CONSUMPTION TOTAL: NEGATIVE
TOTAL SCORE: 1
TOTAL SCORE: 1
ALCOHOL_USE: NO
HAVE YOU EVER FELT YOU SHOULD CUT DOWN ON YOUR DRINKING: YES
DOES PATIENT WANT TO STOP DRINKING: YES
DOES PATIENT WANT TO TALK TO SOMEONE ABOUT QUITTING: NO
TOTAL SCORE: 1
EVER HAD A DRINK FIRST THING IN THE MORNING TO STEADY YOUR NERVES TO GET RID OF A HANGOVER: NO
HOW MANY TIMES IN THE PAST YEAR HAVE YOU HAD 5 OR MORE DRINKS IN A DAY: 0
AVERAGE NUMBER OF DAYS PER WEEK YOU HAVE A DRINK CONTAINING ALCOHOL: 0
HAVE PEOPLE ANNOYED YOU BY CRITICIZING YOUR DRINKING: NO
ON A TYPICAL DAY WHEN YOU DRINK ALCOHOL HOW MANY DRINKS DO YOU HAVE: 0
EVER FELT BAD OR GUILTY ABOUT YOUR DRINKING: NO

## 2023-10-16 ASSESSMENT — PATIENT HEALTH QUESTIONNAIRE - PHQ9
1. LITTLE INTEREST OR PLEASURE IN DOING THINGS: NOT AT ALL
1. LITTLE INTEREST OR PLEASURE IN DOING THINGS: NOT AT ALL
2. FEELING DOWN, DEPRESSED, IRRITABLE, OR HOPELESS: NOT AT ALL
2. FEELING DOWN, DEPRESSED, IRRITABLE, OR HOPELESS: NOT AT ALL
SUM OF ALL RESPONSES TO PHQ9 QUESTIONS 1 AND 2: 0
SUM OF ALL RESPONSES TO PHQ9 QUESTIONS 1 AND 2: 0

## 2023-10-16 ASSESSMENT — PAIN DESCRIPTION - PAIN TYPE
TYPE: OTHER (COMMENT)
TYPE: ACUTE PAIN

## 2023-10-16 ASSESSMENT — CHA2DS2 SCORE
AGE 75 OR GREATER: NO
PRIOR STROKE OR TIA OR THROMBOEMBOLISM: NO
AGE 65 TO 74: NO
CHA2DS2 VASC SCORE: 1
SEX: MALE
HYPERTENSION: NO
VASCULAR DISEASE: NO
CHF OR LEFT VENTRICULAR DYSFUNCTION: NO
DIABETES: YES

## 2023-10-16 ASSESSMENT — FIBROSIS 4 INDEX
FIB4 SCORE: 0.98
FIB4 SCORE: 1.06

## 2023-10-16 NOTE — ED TRIAGE NOTES
Chief Complaint   Patient presents with    Sent by MD     Patient ambulates to triage c/o sob, edema and palpitations that started a week ago, also reports orthopnea.        BP (!) 129/101   Pulse 73   Temp 35.8 °C (96.5 °F) (Temporal)   Resp 16   Ht 1.829 m (6')   Wt 121 kg (267 lb 10.2 oz)   SpO2 94%     Patient educated on ed triage process, instructed to notify staff of any new or worsening symptoms, verbalizes understanding. Patient returned to ed lobby, apologized for wait times.

## 2023-10-17 ENCOUNTER — APPOINTMENT (OUTPATIENT)
Dept: CARDIOLOGY | Facility: MEDICAL CENTER | Age: 58
DRG: 291 | End: 2023-10-17
Payer: COMMERCIAL

## 2023-10-17 PROBLEM — I50.9 HEART FAILURE (HCC): Status: ACTIVE | Noted: 2023-10-17

## 2023-10-17 PROBLEM — E11.9 TYPE 2 DIABETES MELLITUS (HCC): Status: ACTIVE | Noted: 2023-10-17

## 2023-10-17 LAB
ALBUMIN SERPL BCP-MCNC: 3.7 G/DL (ref 3.2–4.9)
ALBUMIN/GLOB SERPL: 1.8 G/DL
ALP SERPL-CCNC: 48 U/L (ref 30–99)
ALT SERPL-CCNC: 39 U/L (ref 2–50)
AMPHET UR QL SCN: NEGATIVE
ANION GAP SERPL CALC-SCNC: 8 MMOL/L (ref 7–16)
AST SERPL-CCNC: 21 U/L (ref 12–45)
BARBITURATES UR QL SCN: NEGATIVE
BASOPHILS # BLD AUTO: 0.5 % (ref 0–1.8)
BASOPHILS # BLD: 0.04 K/UL (ref 0–0.12)
BENZODIAZ UR QL SCN: NEGATIVE
BILIRUB SERPL-MCNC: 0.6 MG/DL (ref 0.1–1.5)
BUN SERPL-MCNC: 18 MG/DL (ref 8–22)
BZE UR QL SCN: NEGATIVE
CALCIUM ALBUM COR SERPL-MCNC: 9 MG/DL (ref 8.5–10.5)
CALCIUM SERPL-MCNC: 8.8 MG/DL (ref 8.5–10.5)
CANNABINOIDS UR QL SCN: POSITIVE
CHLORIDE SERPL-SCNC: 107 MMOL/L (ref 96–112)
CO2 SERPL-SCNC: 26 MMOL/L (ref 20–33)
CREAT SERPL-MCNC: 1.06 MG/DL (ref 0.5–1.4)
EOSINOPHIL # BLD AUTO: 0.06 K/UL (ref 0–0.51)
EOSINOPHIL NFR BLD: 0.8 % (ref 0–6.9)
ERYTHROCYTE [DISTWIDTH] IN BLOOD BY AUTOMATED COUNT: 44.3 FL (ref 35.9–50)
EST. AVERAGE GLUCOSE BLD GHB EST-MCNC: 137 MG/DL
FENTANYL UR QL: NEGATIVE
GFR SERPLBLD CREATININE-BSD FMLA CKD-EPI: 81 ML/MIN/1.73 M 2
GLOBULIN SER CALC-MCNC: 2.1 G/DL (ref 1.9–3.5)
GLUCOSE SERPL-MCNC: 133 MG/DL (ref 65–99)
HBA1C MFR BLD: 6.4 % (ref 4–5.6)
HCT VFR BLD AUTO: 42 % (ref 42–52)
HGB BLD-MCNC: 13.8 G/DL (ref 14–18)
IMM GRANULOCYTES # BLD AUTO: 0.02 K/UL (ref 0–0.11)
IMM GRANULOCYTES NFR BLD AUTO: 0.3 % (ref 0–0.9)
LV EJECT FRACT  99904: 30
LV EJECT FRACT MOD 2C 99903: 50.27
LV EJECT FRACT MOD 4C 99902: 30.71
LV EJECT FRACT MOD BP 99901: 43.77
LYMPHOCYTES # BLD AUTO: 1.9 K/UL (ref 1–4.8)
LYMPHOCYTES NFR BLD: 24.1 % (ref 22–41)
MAGNESIUM SERPL-MCNC: 1.9 MG/DL (ref 1.5–2.5)
MCH RBC QN AUTO: 31.2 PG (ref 27–33)
MCHC RBC AUTO-ENTMCNC: 32.9 G/DL (ref 32.3–36.5)
MCV RBC AUTO: 95 FL (ref 81.4–97.8)
METHADONE UR QL SCN: NEGATIVE
MONOCYTES # BLD AUTO: 0.6 K/UL (ref 0–0.85)
MONOCYTES NFR BLD AUTO: 7.6 % (ref 0–13.4)
NEUTROPHILS # BLD AUTO: 5.28 K/UL (ref 1.82–7.42)
NEUTROPHILS NFR BLD: 66.7 % (ref 44–72)
NRBC # BLD AUTO: 0 K/UL
NRBC BLD-RTO: 0 /100 WBC (ref 0–0.2)
OPIATES UR QL SCN: NEGATIVE
OXYCODONE UR QL SCN: NEGATIVE
PCP UR QL SCN: NEGATIVE
PLATELET # BLD AUTO: 211 K/UL (ref 164–446)
PMV BLD AUTO: 11 FL (ref 9–12.9)
POTASSIUM SERPL-SCNC: 4.6 MMOL/L (ref 3.6–5.5)
PROPOXYPH UR QL SCN: NEGATIVE
PROT SERPL-MCNC: 5.8 G/DL (ref 6–8.2)
RBC # BLD AUTO: 4.42 M/UL (ref 4.7–6.1)
SODIUM SERPL-SCNC: 141 MMOL/L (ref 135–145)
TSH SERPL DL<=0.005 MIU/L-ACNC: 1.66 UIU/ML (ref 0.38–5.33)
WBC # BLD AUTO: 7.9 K/UL (ref 4.8–10.8)

## 2023-10-17 PROCEDURE — 36415 COLL VENOUS BLD VENIPUNCTURE: CPT

## 2023-10-17 PROCEDURE — 85025 COMPLETE CBC W/AUTO DIFF WBC: CPT

## 2023-10-17 PROCEDURE — 700111 HCHG RX REV CODE 636 W/ 250 OVERRIDE (IP): Mod: JZ

## 2023-10-17 PROCEDURE — 93306 TTE W/DOPPLER COMPLETE: CPT

## 2023-10-17 PROCEDURE — 700102 HCHG RX REV CODE 250 W/ 637 OVERRIDE(OP)

## 2023-10-17 PROCEDURE — A9270 NON-COVERED ITEM OR SERVICE: HCPCS

## 2023-10-17 PROCEDURE — 80053 COMPREHEN METABOLIC PANEL: CPT

## 2023-10-17 PROCEDURE — 99222 1ST HOSP IP/OBS MODERATE 55: CPT | Mod: GC | Performed by: FAMILY MEDICINE

## 2023-10-17 PROCEDURE — 83036 HEMOGLOBIN GLYCOSYLATED A1C: CPT

## 2023-10-17 PROCEDURE — 83735 ASSAY OF MAGNESIUM: CPT

## 2023-10-17 PROCEDURE — 770020 HCHG ROOM/CARE - TELE (206)

## 2023-10-17 PROCEDURE — 93306 TTE W/DOPPLER COMPLETE: CPT | Mod: 26 | Performed by: INTERNAL MEDICINE

## 2023-10-17 PROCEDURE — 700117 HCHG RX CONTRAST REV CODE 255: Performed by: INTERNAL MEDICINE

## 2023-10-17 PROCEDURE — 84443 ASSAY THYROID STIM HORMONE: CPT

## 2023-10-17 PROCEDURE — 80307 DRUG TEST PRSMV CHEM ANLYZR: CPT

## 2023-10-17 RX ADMIN — APIXABAN 5 MG: 5 TABLET, FILM COATED ORAL at 05:05

## 2023-10-17 RX ADMIN — DOCUSATE SODIUM 50 MG AND SENNOSIDES 8.6 MG 2 TABLET: 8.6; 5 TABLET, FILM COATED ORAL at 05:06

## 2023-10-17 RX ADMIN — HUMAN ALBUMIN MICROSPHERES AND PERFLUTREN 3 ML: 10; .22 INJECTION, SOLUTION INTRAVENOUS at 11:30

## 2023-10-17 RX ADMIN — ROSUVASTATIN CALCIUM 10 MG: 5 TABLET, FILM COATED ORAL at 05:06

## 2023-10-17 RX ADMIN — FUROSEMIDE 20 MG: 10 INJECTION, SOLUTION INTRAVENOUS at 05:05

## 2023-10-17 RX ADMIN — METFORMIN HYDROCHLORIDE 1000 MG: 500 TABLET, EXTENDED RELEASE ORAL at 18:24

## 2023-10-17 RX ADMIN — METOPROLOL TARTRATE 50 MG: 25 TABLET, FILM COATED ORAL at 12:58

## 2023-10-17 RX ADMIN — LOSARTAN POTASSIUM 25 MG: 50 TABLET, FILM COATED ORAL at 12:58

## 2023-10-17 RX ADMIN — METOPROLOL TARTRATE 50 MG: 25 TABLET, FILM COATED ORAL at 18:24

## 2023-10-17 RX ADMIN — APIXABAN 5 MG: 5 TABLET, FILM COATED ORAL at 18:25

## 2023-10-17 RX ADMIN — FUROSEMIDE 20 MG: 10 INJECTION, SOLUTION INTRAVENOUS at 16:15

## 2023-10-17 ASSESSMENT — LIFESTYLE VARIABLES
ALCOHOL_USE: NO
AVERAGE NUMBER OF DAYS PER WEEK YOU HAVE A DRINK CONTAINING ALCOHOL: 0
EVER HAD A DRINK FIRST THING IN THE MORNING TO STEADY YOUR NERVES TO GET RID OF A HANGOVER: NO
DOES PATIENT WANT TO STOP DRINKING: NO
ON A TYPICAL DAY WHEN YOU DRINK ALCOHOL HOW MANY DRINKS DO YOU HAVE: 0
TOTAL SCORE: 0
HAVE YOU EVER FELT YOU SHOULD CUT DOWN ON YOUR DRINKING: NO
EVER FELT BAD OR GUILTY ABOUT YOUR DRINKING: NO
HOW MANY TIMES IN THE PAST YEAR HAVE YOU HAD 5 OR MORE DRINKS IN A DAY: 0
HAVE PEOPLE ANNOYED YOU BY CRITICIZING YOUR DRINKING: NO
TOTAL SCORE: 0
DOES PATIENT WANT TO TALK TO SOMEONE ABOUT QUITTING: NO
CONSUMPTION TOTAL: NEGATIVE
TOTAL SCORE: 0

## 2023-10-17 ASSESSMENT — FIBROSIS 4 INDEX
FIB4 SCORE: 0.92
FIB4 SCORE: 0.92

## 2023-10-17 ASSESSMENT — PAIN DESCRIPTION - PAIN TYPE: TYPE: ACUTE PAIN

## 2023-10-17 NOTE — PROGRESS NOTES
Cardiology review note:     I was called by Dr. Bustos regarding this 58-year-old male with recently diagnosed atrial fibrillation around 1 week prior who presented for dyspnea on exertion and orthopnea progressive for several weeks.  Recent alcohol abuse up to 3 weeks ago and ongoing tobacco abuse up to 1 week ago.  Consultations requested for low EF and atrial fibrillation.    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    I suggest continuing IV diuresis with Lasix 40 mg IV twice daily initiation of neuro humeral therapy including losartan, Toprol XL rather than Lopressor statin and SGL 2T inhibitor.  With regard to his atrial fibrillation appears rate controlled however he would benefit from restoration of sinus rhythm given his low EF and subsequent ischemic evaluation most likely once euvolemic as an outpatient and therefore continuation of oral anticoagulation while hospitalized is reasonable.  I also suggest abstinence from alcohol abuse and a urine drug screen.  Full consultation will follow tomorrow.       Electronically Signed by:    Ottoniel Sebastian MD, FACC, [facility]  Division of Interventional Cardiology  [facility]    10/17/2023  4:47 PM

## 2023-10-17 NOTE — PROGRESS NOTES
4 Eyes Skin Assessment Completed by MARY Martínez and MARY Burrell.    Head WDL  Ears WDL  Nose WDL  Mouth WDL  Neck WDL  Breast/Chest WDL  Shoulder Blades WDL  Spine WDL  (R) Arm/Elbow/Hand WDL  (L) Arm/Elbow/Hand WDL  Abdomen WDL  Groin WDL  Scrotum/Coccyx/Buttocks WDL  (R) Leg edema  (L) Leg Edema  (R) Heel/Foot/Toe Edema  (L) Heel/Foot/Toe Edema          Devices In Places Pulse Ox      Interventions In Place N/A    Possible Skin Injury No    Pictures Uploaded Into Epic N/A  Wound Consult Placed N/A  RN Wound Prevention Protocol Ordered No

## 2023-10-17 NOTE — ED PROVIDER NOTES
ED Provider Note    CHIEF COMPLAINT  Chief Complaint   Patient presents with    Sent by MD     Patient ambulates to triage c/o sob, edema and palpitations that started a week ago, also reports orthopnea.        EXTERNAL RECORDS REVIEWED  Outpatient notes.  He has a history of epistaxis and bronchitis.    HPI/ROS  LIMITATION TO HISTORY   Select: : None  OUTSIDE HISTORIAN(S):  Wife at bedside, history included below    Harjit Pantoja is a 58 y.o. male who presents with increasing shortness of breath.  Hi symptoms ongoing for two weeks and are continuing to worsen. He reports mostly dyspnea with exertion and when lying flat.  He needs to take a break when walking long distances.  He says he was newly noted with atrial fibrillation about one week ago by his primary physician.  He was started on a beta-blocker and a blood thinner (eliquis).  He says that his symptoms have been progressively worsening despite these medications.  He has gained about 10 to 20 pounds. He denies chest pain but does endorse a tightness.   He also endorses orthopnea and PND.     Recently quit tobacco and alcohol use.     PAST MEDICAL HISTORY   has a past medical history of Cold (12/7/11) and Snoring.    SURGICAL HISTORY   has a past surgical history that includes other abdominal surgery and ventral hernia repair laparoscopic (12/14/2011).    FAMILY HISTORY  History reviewed. No pertinent family history.    SOCIAL HISTORY  Social History     Tobacco Use    Smoking status: Every Day     Current packs/day: 1.00     Average packs/day: 1 pack/day for 10.0 years (10.0 ttl pk-yrs)     Types: Cigarettes    Smokeless tobacco: Current   Vaping Use    Vaping Use: Former    Quit date: 12/2/2019   Substance and Sexual Activity    Alcohol use: Yes     Alcohol/week: 2.4 oz     Types: 4 Standard drinks or equivalent per week    Drug use: Yes     Types: Marijuana, Oral     Comment: one every two weeks.     Sexual activity: Not on file       CURRENT  MEDICATIONS  Home Medications       Reviewed by Tanya Chang R.N. (Registered Nurse) on 10/16/23 at 2127  Med List Status: Complete     Medication Last Dose Status   apixaban (ELIQUIS) 5mg Tab 10/16/2023 Active   metFORMIN ER (GLUCOPHAGE XR) 500 MG TABLET SR 24 HR 10/16/2023 Active   metoprolol tartrate (LOPRESSOR) 50 MG Tab 10/16/2023 Active   Probiotic Product (PROBIOTIC PO) 10/16/2023 Active   rosuvastatin (CRESTOR) 10 MG Tab 10/16/2023 Active                    ALLERGIES  Allergies   Allergen Reactions    Cleocin [Clindamycin Hcl] Hives    Pcn [Penicillins] Hives       PHYSICAL EXAM  VITAL SIGNS: /78   Pulse 93   Temp 35.9 °C (96.7 °F) (Temporal)   Resp 18   Ht 1.829 m (6')   Wt 121 kg (266 lb 1.5 oz)   SpO2 94%   BMI 36.09 kg/m²    Constitutional: Awake and alert . Non toxic  HENT: Normal inspection.    Eyes: Normal inspection  Neck: Grossly normal range of motion.  Cardiovascular: Normal heart rate, Normal rhythm.  Symmetric peripheral pulses.   Thorax & Lungs: No respiratory distress. He has crackles in bilateral lung bases  Abdomen: Soft, non-distended, nontender to palpation in all 4 quadrants, no mass  Skin: No obvious rash.  Extremities: Warm, well perfused. No clubbing, cyanosis. He has bilateral lower extremity edema  Neurologic: Grossly normal   Psychiatric: Normal for situation      DIAGNOSTIC STUDIES / PROCEDURES  EKG  I have independently interpreted this EKG  Results for orders placed or performed during the hospital encounter of 10/16/23   EKG   Result Value Ref Range    Report       [facility] Emergency Dept.    Test Date:  2023-10-16  Pt Name:    MAURO MILLARD             Department: ER  MRN:        [id number]                      Room:  Gender:     Male                         Technician: [id number]  :        1965                   Requested By:ER TRIAGE PROTOCOL  Order #:    [id number]                    Reading MD: Lesley Lutz    Measurements  Intervals                                 Axis  Rate:       124                          P:          0  SD:         0                            QRS:        -138  QRSD:       124                          T:          19  QT:         328  QTc:        471    Interpretive Statements  Atrial fibrillation  Ventricular premature complex  Right bundle branch block  No previous ECG available for comparison  Electronically Signed On 10- 17:22:01 PDT by Lesley Lutz           LABS  Results for orders placed or performed during the hospital encounter of 10/16/23   CBC with Differential   Result Value Ref Range    WBC 8.5 4.8 - 10.8 K/uL    RBC 4.68 (L) 4.70 - 6.10 M/uL    Hemoglobin 14.4 14.0 - 18.0 g/dL    Hematocrit 44.6 42.0 - 52.0 %    MCV 95.3 81.4 - 97.8 fL    MCH 30.8 27.0 - 33.0 pg    MCHC 32.3 32.3 - 36.5 g/dL    RDW 44.8 35.9 - 50.0 fL    Platelet Count 233 164 - 446 K/uL    MPV 10.5 9.0 - 12.9 fL    Neutrophils-Polys 65.50 44.00 - 72.00 %    Lymphocytes 26.20 22.00 - 41.00 %    Monocytes 6.80 0.00 - 13.40 %    Eosinophils 0.60 0.00 - 6.90 %    Basophils 0.50 0.00 - 1.80 %    Immature Granulocytes 0.40 0.00 - 0.90 %    Nucleated RBC 0.00 0.00 - 0.20 /100 WBC    Neutrophils (Absolute) 5.56 1.82 - 7.42 K/uL    Lymphs (Absolute) 2.22 1.00 - 4.80 K/uL    Monos (Absolute) 0.58 0.00 - 0.85 K/uL    Eos (Absolute) 0.05 0.00 - 0.51 K/uL    Baso (Absolute) 0.04 0.00 - 0.12 K/uL    Immature Granulocytes (abs) 0.03 0.00 - 0.11 K/uL    NRBC (Absolute) 0.00 K/uL   Complete Metabolic Panel (CMP)   Result Value Ref Range    Sodium 139 135 - 145 mmol/L    Potassium 4.9 3.6 - 5.5 mmol/L    Chloride 106 96 - 112 mmol/L    Co2 23 20 - 33 mmol/L    Anion Gap 10.0 7.0 - 16.0    Glucose 108 (H) 65 - 99 mg/dL    Bun 19 8 - 22 mg/dL    Creatinine 0.96 0.50 - 1.40 mg/dL    Calcium 9.2 8.5 - 10.5 mg/dL    Correct Calcium 9.1 8.5 - 10.5 mg/dL    AST(SGOT) 25 12 - 45 U/L    ALT(SGPT) 40 2 - 50 U/L    Alkaline Phosphatase 53 30 - 99 U/L    Total Bilirubin  0.6 0.1 - 1.5 mg/dL    Albumin 4.1 3.2 - 4.9 g/dL    Total Protein 6.4 6.0 - 8.2 g/dL    Globulin 2.3 1.9 - 3.5 g/dL    A-G Ratio 1.8 g/dL   Troponins NOW   Result Value Ref Range    Troponin T 22 (H) 6 - 19 ng/L   Troponins in two (2) hours   Result Value Ref Range    Troponin T 24 (H) 6 - 19 ng/L   proBrain Natriuretic Peptide, NT   Result Value Ref Range    NT-proBNP 6003 (H) 0 - 125 pg/mL   ESTIMATED GFR   Result Value Ref Range    GFR (CKD-EPI) 91 >60 mL/min/1.73 m 2   EKG   Result Value Ref Range    Report       [facility] Emergency Dept.    Test Date:  2023-10-16  Pt Name:    MAURO MILLARD             Department: ER  MRN:        [id number]                      Room:  Gender:     Male                         Technician: [id number]  :        1965                   Requested By:ER TRIAGE PROTOCOL  Order #:    [id number]                    Reading MD: Lesley Lutz    Measurements  Intervals                                Axis  Rate:       124                          P:          0  SC:         0                            QRS:        -138  QRSD:       124                          T:          19  QT:         328  QTc:        471    Interpretive Statements  Atrial fibrillation  Ventricular premature complex  Right bundle branch block  No previous ECG available for comparison  Electronically Signed On 10- 17:22:01 PDT by Lesley Lutz           RADIOLOGY  I have independently interpreted the diagnostic imaging associated with this visit and am waiting the final reading from the radiologist.   My preliminary interpretation is as follows: Small pleural effusion  Radiologist interpretation:   DX-CHEST-PORTABLE (1 VIEW)   Final Result      1.  Bilateral atelectasis and enlargement the cardiac silhouette      2.  Probable small bilateral pleural effusion      EC-ECHOCARDIOGRAM COMPLETE W/O CONT    (Results Pending)         COURSE & MEDICAL DECISION MAKING    ED Observation Status? No      INITIAL ASSESSMENT, COURSE AND PLAN  Care Narrative: This is a 58-year-old male with recently diagnosed atrial fibrillation who presents with progressive orthopnea, dyspnea and bilateral lower extremity swelling.  On arrival he is not hypoxic, hemodynamically normal.  He does appear to be volume overloaded on exam but no signs of respiratory distress or failure.  Chest x-ray does show enlarged cardiac silhouette and likely bilateral pleural effusion.  Additionally his BNP is elevated to 6000 and troponin is slightly elevated at 22.  EKG shows A-fib, no signs of ischemia.  Overall his presentation does seem to be consistent with volume overload, most likely cardiogenic though he does not have a prior history of this.  Again no chest pain, nonischemic EKG and only slightly elevated troponin and ACS seems unlikely.  I considered PE however unlikely given subacute onset of symptoms and no hypoxia or tachycardia in the ER.  He is also compliant with his anticoagulation. Patient has been rate controlled. He  was given 10 mg of IV Lasix.  He will require admission for further work-up including echocardiogram and medication management.  I discussed with Dr. Acevedo, who will admit for further management.    HTN/IDDM FOLLOW UP:  The patient is referred to a primary physician for blood pressure management, diabetic screening, and for all other preventive health concerns        DISPOSITION AND DISCUSSIONS  I have discussed management of the patient with the following physicians and LACIE's:  Dr. Acevedo     Discussion of management with other [facility] or appropriate source(s): None       FINAL DIAGNOSIS  1. Hypervolemia, unspecified hypervolemia type Acute   2. Atrial fibrillation, unspecified type (HCC)           Electronically signed by: Lesley Lutz M.D., 10/16/2023 5:18 PM

## 2023-10-17 NOTE — ASSESSMENT & PLAN NOTE
#CHF new dx with EF of 30%  #hypoxia (resolved)    Patient presents the ER with fluid overload, including orthopnea and bilateral lower extremity pitting edema with 10 pounds of weight gain over the last several days.  He also reports dyspnea on exertion.  He breathes well at rest.  Patient does have history of heavy alcohol use, quitting 3 weeks ago.  NT-proBNP was elevated at 6000.  He has never seen a cardiologist.  Symptoms are highly suggestive of heart failure exacerbation.  Patient is at risk for liver disease based on alcohol use history, however transaminases, alk phos, bilirubin all normal on labs, and physical exam suggestive more of heart failure than liver failure. TSH unremarkable    -Echo completed 10/17, shows EF 30% and global hypokenisis  -appreciate card's reccs below:    -   Continue IV diuresis until euvolemic. Strict I and O. Daily stand up weight, cardiology commend continuing diuresis even with increased creatinine as this might be due to a cardiorenal syndrome   - Metoprolol 100 mg, uptitrate as tolerated for rate control    - Losartan 50 mg daily    - Farxiga    - Aldactone    - Cardizem 30 mg q 6hrs    - Eliquis oral anticoagulation     - Amiodarone continued for cardioversion planned 10/22/2023, notes that it will be performed at bedside per cardiology  - Abstinence from tobacco and alcohol  - Strict I's and O's  - Daily weights  - Continue home statin  -continue vs monitoring

## 2023-10-17 NOTE — CARE PLAN
The patient is Stable - Low risk of patient condition declining or worsening    Shift Goals  Clinical Goals: wean O2, ambulate, up to chair for meals  Patient Goals: off O2, plan of care  Family Goals: plan of care, communication    Progress made toward(s) clinical / shift goals:    Problem: Knowledge Deficit - Standard  Goal: Patient and family/care givers will demonstrate understanding of plan of care, disease process/condition, diagnostic tests and medications  Outcome: Progressing  Note: Patient and wife involved in and agreeable to plan of care.  Asks questions and verbalizes understanding.  Patient educated about new medication regimen and side effects.      Problem: Care Map:  Admission Optimal Outcome for the Heart Failure Patient  Goal: Admission:  Optimal Care of the heart failure patient  Outcome: Progressing  Note: Patient and wife with resources at bedside.  Educated on heart failure, edema, importance of ambulation and activity.  Educated about A. FIB and medications being prescribed for rate control and blood pressure.        Patient is not progressing towards the following goals: n/a

## 2023-10-17 NOTE — ASSESSMENT & PLAN NOTE
History of diabetes.  Last A1c noted to be 5.9 in December 2022. Treated with metformin.   A1c 6.4 this admission    -Continue home metformin  -educate on lifestyle changes I.e. diet and exercise

## 2023-10-17 NOTE — CARE PLAN
The patient is Stable - Low risk of patient condition declining or worsening    Shift Goals  Clinical Goals: Monitor abnormal labs; Administer Lasix; patient to remain without falls  Patient Goals: Lose fluid weight, rest  Family Goals: to lose fluid weight and go home per wife Deepa    Progress made toward(s) clinical / shift goals:      Patient is not progressing towards the following goals:

## 2023-10-17 NOTE — PROGRESS NOTES
"[facility] [place]     Attending: Chaparro Archer M.d.    Senior Resident: Alex Castro Md      PATIENT: Harjit Pantoja; [id number]; 1965    ID: 58 y.o. male admitted for worsening edema in the setting of recently diagnosed A. Fib     SUBJECTIVE: No acute events overnight. Patient this morning reports that he is feeling better and believes that his swelling has decreased in his knees, but the calves and ankles are still swollen. Patient states that his shortness of breath is improving however is worse with exertion. Patient denies chest pain and palpitations.    OBJECTIVE:     Vitals:    10/17/23 0400 10/17/23 0626 10/17/23 0750 10/17/23 1123   BP:  105/72 (!) 143/86 (!) 138/90   Pulse:  65 100 74   Resp:  18 18 18   Temp:  36.6 °C (97.9 °F) 36.8 °C (98.2 °F) 36.4 °C (97.6 °F)   TempSrc:  Oral Temporal Temporal   SpO2:  90% 93% 94%   Weight: 119 kg (262 lb 12.6 oz) 119 kg (262 lb 12.6 oz)     Height:  1.829 m (6' 0.01\")         Intake/Output Summary (Last 24 hours) at 10/17/2023 1201  Last data filed at 10/17/2023 0600  Gross per 24 hour   Intake 400 ml   Output 1350 ml   Net -950 ml       PE:   General: No acute distress, resting comfortably in bed.  HEENT: NC/AT. PERRLA. EOMI. MMM  Cardiovascular: Normal S1/S2, irregularly irregular, no m/r/g  Respiratory: Symmetric inspiratory effort. CTAB with no adventitious sounds  Abdomen: BS+, soft, NT/ND   EXT:  BAIG, 5/5 strength, 2+ pulses, no rashes, bruising, or bleeding.2+pitting edema bilateral legs to the knee   Neuro: Non focal with no numbness, tingling or changes in sensation    LABS:  Recent Labs     10/16/23  1625 10/17/23  0041   WBC 8.5 7.9   RBC 4.68* 4.42*   HEMOGLOBIN 14.4 13.8*   HEMATOCRIT 44.6 42.0   MCV 95.3 95.0   MCH 30.8 31.2   RDW 44.8 44.3   PLATELETCT 233 211   MPV 10.5 11.0   NEUTSPOLYS 65.50 66.70   LYMPHOCYTES 26.20 24.10   MONOCYTES 6.80 7.60   EOSINOPHILS 0.60 0.80   BASOPHILS 0.50 0.50     Recent Labs     " "10/16/23  1625 10/17/23  0041   SODIUM 139 141   POTASSIUM 4.9 4.6   CHLORIDE 106 107   CO2 23 26   BUN 19 18   CREATININE 0.96 1.06   CALCIUM 9.2 8.8   MAGNESIUM  --  1.9   ALBUMIN 4.1 3.7     Estimated GFR/CRCL = Estimated Creatinine Clearance: 101.2 mL/min (by C-G formula based on SCr of 1.06 mg/dL).  Recent Labs     10/16/23  1625 10/17/23  0041   GLUCOSE 108* 133*     Recent Labs     10/16/23  1625 10/17/23  0041   ASTSGOT 25 21   ALTSGPT 40 39   TBILIRUBIN 0.6 0.6   ALKPHOSPHAT 53 48   GLOBULIN 2.3 2.1             No results for input(s): \"INR\", \"APTT\", \"FIBRINOGEN\" in the last 72 hours.    Invalid input(s): \"DIMER\"    MICROBIOLOGY:   Results       ** No results found for the last 168 hours. **              IMAGING:   EC-ECHOCARDIOGRAM COMPLETE W/ CONT         DX-CHEST-PORTABLE (1 VIEW)   Final Result      1.  Bilateral atelectasis and enlargement the cardiac silhouette      2.  Probable small bilateral pleural effusion          MEDS:  Current Facility-Administered Medications   Medication Last Admin    senna-docusate (Pericolace Or Senokot S) 8.6-50 MG per tablet 2 Tablet 2 Tablet at 10/17/23 0506    And    polyethylene glycol/lytes (Miralax) PACKET 1 Packet      And    magnesium hydroxide (Milk Of Magnesia) suspension 30 mL      And    bisacodyl (Dulcolax) suppository 10 mg      acetaminophen (Tylenol) tablet 650 mg      labetalol (Normodyne/Trandate) injection 10 mg      apixaban (Eliquis) tablet 5 mg 5 mg at 10/17/23 0505    metFORMIN ER (Glucophage XR) tablet 1,000 mg 1,000 mg at 10/16/23 2047    metoprolol tartrate (Lopressor) tablet 50 mg 50 mg at 10/17/23 1258    rosuvastatin (Crestor) tablet 10 mg 10 mg at 10/17/23 0506    losartan (Cozaar) tablet 25 mg 25 mg at 10/17/23 1258    furosemide (Lasix) injection 20 mg 20 mg at 10/17/23 0505       ASSESSMENT/PLAN: Harjit Pantoja is a 58 y.o. male admitted for worsening edema in the setting of recently diagnosed atrial fibrillation.    * Hypervolemia- " (present on admission)  Assessment & Plan  Patient presents the ER with fluid overload, including orthopnea and bilateral lower extremity pitting edema with 10 pounds of weight gain over the last several days.  He also reports dyspnea on exertion.  He breathes well at rest.  Patient does have history of heavy alcohol use, quitting 3 weeks ago.  NT-proBNP was elevated at 6000.  He has never seen a cardiologist.  Symptoms are highly suggestive of heart failure exacerbation.  Patient is at risk for liver disease based on alcohol use history, however transaminases, alk phos, bilirubin all normal on labs, and physical exam suggestive more of heart failure than liver failure.    -Echo completed 10/17, read pending  - Lasix 20 IV twice daily  - Strict I's and O's  - Continue home beta-blocker, metoprolol tartrate 50 mg twice daily  - Continue home statin  - Losartan 25 mg  -TSH and A1c ordered      Type 2 diabetes mellitus (HCC)  Assessment & Plan  History of diabetes.  Last A1c noted to be 5.9 in December 2022. Treated with metformin.     -Continue home metformin  - A1c ordered    A-fib (HCC)  Assessment & Plan  Patient has recently diagnosed A-fib.  CHA2DS2-VASc=3.  He was started on Eliquis outpatient.  Patient's heart rate has been rate controlled however when in room with patient and he laughs or becomes active heart rate does increase to the low 100s a few times into the 110s.    - Continue Eliquis  - Cardiology consult  - Metoprolol tartrate 50 mg twice daily  - Telemetry      Has recently quit alcohol use  Assessment & Plan  Patient quit drinking 3 weeks ago.  He previously had 1 year of sobriety.  He is currently using marijuana Gummies.  He has not gone to groups although he has tried a few AA meetings, but is not interested in going back.  Labs are not suggestive of liver failure (PLT, albumin, bili, liver enzymes all WNL).  Patient was with wife today who reports that he is highly motivated to not have any  alcoholic beverages.          Core Measures:  Fluids: None  Lines: PIV  Abx: None  Diet: Diabetic with 2 g salt restriction and 2 L fluid restriction  PPX: SCDs and Eliquis  DISPO: Patient to remain inpatient for further medical management of hypervolemia and A-fib      CODE STATUS: Full     Shaina Bustos MD  PGY1  [facility]

## 2023-10-17 NOTE — CARE PLAN
The patient is Watcher - Medium risk of patient condition declining or worsening    Shift Goals  Clinical Goals: Monitor abnormal labs; Administer Lasix; patient to remain without falls  Patient Goals: Lose fluid weight, rest  Family Goals: to lose fluid weight and go home per wife Deepa    Progress made toward(s) clinical / shift goals:      Patient is not progressing towards the following goals:

## 2023-10-17 NOTE — ASSESSMENT & PLAN NOTE
Patient quit drinking 3 weeks ago.  He previously had 1 year of sobriety.  He is currently using marijuana Gummies.  He has not gone to groups although he has tried a few AA meetings, but is not interested in going back.  Labs are not suggestive of liver failure (PLT, albumin, bili, liver enzymes all WNL).  Patient was with wife today who reports that he is highly motivated to not have any alcoholic beverages.    - Will provide resources at discharge

## 2023-10-17 NOTE — ED NOTES
Med Rec updated and complete per patient/spouse and RX bottles via phone pics  Allergies Reviewed  Antibiotcs in past 30 days:NO  Anticoagulant in past 14 days:YES  Anticoagulant:ELIQUIS 5 MG Last dose:10/16/2023  Preferred pharmacy:[facility] on [address] [facility]    Verified medications with spouse with pics on phone

## 2023-10-17 NOTE — H&P
"[facility] MEDICINE HISTORY AND PHYSICAL     PATIENT ID:  NAME:  Harjit Pantoja  MRN:               [id number]  YOB: 1965    Date of Admission: 10/16/2023     Attending: Chaparro Archer M.d.    Resident: Dangelo Acevedo MD    Primary Care Physician:  Du Mullen M.D.    CC:    Chief Complaint   Patient presents with    Sent by MD     Patient ambulates to triage c/o sob, edema and palpitations that started a week ago, also reports orthopnea.        HPI: Harjit Pantoja is a 58 y.o. male who presented with swelling and orthopnea.    Patient reports that 2 weeks ago he started having symptoms of shortness of breath when lying flat on his bed and feeling of chest pressure when lying flat on his bed.  He started to need to sleep in the recliner.  Over the last 2 weeks, he has noticed worsening edema which is particularly worsened over the last few days.  Patient reports that he has gained over 10 pounds since the swelling started.  He also noticed dyspnea on exertion, he used to be able to walk long distances but required a break to walk from the parking lot into the emergency room today.    Patient went to see his primary doctor 1 week ago and received EKG which showed A-fib.  He was started on a beta-blocker and blood thinners.  He started to breathe somewhat easier but the swelling did not improve.    Helena did quit drinking 3 weeks ago, prior to this he was a \"heavy drinker\".  He quit smoking 1 week ago.  Patient reports no recent illness, had COVID on August 1.  He does use marijuana Gummies.  Patient does report history of diverticulitis with colon resection 10-15 years ago.    ERCourse:  Patient was told to go to the ER by his outpatient doctor.  Labs are reassuring, no transaminitis.  Last A1c from December 22 was 5.9.  Troponin was 22, NT-azbVHI=7180.  CXR shows bilateral atelectasis, possible small bilateral pleural effusion, and cardiomegaly.  EKG shows atrial fibrillation with " PRECIOUS.    REVIEW OF SYSTEMS:   Ten systems reviewed and were negative except as noted in the HPI.                PAST MEDICAL HISTORY:  Past Medical History:   Diagnosis Date    Cold 12/7/11    stomach flu 12/3/11    Snoring        PAST SURGICAL HISTORY:  Past Surgical History:   Procedure Laterality Date    VENTRAL HERNIA REPAIR LAPAROSCOPIC  12/14/2011    Performed by TUYET RIDLEY at SURGERY [facility] ORS    OTHER ABDOMINAL SURGERY      repair of diverticulitis; colon resection       FAMILY HISTORY:  No family history on file.    SOCIAL HISTORY:   Pt lives in [place]  Smoking: quit 1 week ago  Etoh use: quit 3 weeks ago, history of heavy drinking  Drug use: marijuana, no other drugs, no history of IV drug use.    DIET:   Orders Placed This Encounter   Procedures    Diet Order Diet: Consistent CHO (Diabetic); Second Modifier: (optional): Cardiac     Standing Status:   Standing     Number of Occurrences:   1     Order Specific Question:   Diet:     Answer:   Consistent CHO (Diabetic) [4]     Order Specific Question:   Second Modifier: (optional)     Answer:   Cardiac [6]       ALLERGIES:  Allergies   Allergen Reactions    Cleocin [Clindamycin Hcl] Hives    Pcn [Penicillins] Hives       OUTPATIENT MEDICATIONS:    Current Facility-Administered Medications:     furosemide (Lasix) injection 10 mg, 10 mg, Intravenous, Once, Lesley Lutz M.D.    senna-docusate (Pericolace Or Senokot S) 8.6-50 MG per tablet 2 Tablet, 2 Tablet, Oral, BID **AND** polyethylene glycol/lytes (Miralax) PACKET 1 Packet, 1 Packet, Oral, QDAY PRN **AND** magnesium hydroxide (Milk Of Magnesia) suspension 30 mL, 30 mL, Oral, QDAY PRN **AND** bisacodyl (Dulcolax) suppository 10 mg, 10 mg, Rectal, QDAY PRN, Dangelo Acevedo M.D.    acetaminophen (Tylenol) tablet 650 mg, 650 mg, Oral, Q6HRS PRN, Dangelo Acevedo M.D.    labetalol (Normodyne/Trandate) injection 10 mg, 10 mg, Intravenous, Q4HRS PRN, Dangelo Acevedo M.D.    apixaban (Eliquis) tablet 5  mg, 5 mg, Oral, BID, Dangelo Acevedo M.D.    metFORMIN ER (Glucophage XR) tablet 1,000 mg, 1,000 mg, Oral, BID, Dangelo Acevedo M.D.    metoprolol tartrate (Lopressor) tablet 50 mg, 50 mg, Oral, BID, Dangelo Acevedo M.D.    [START ON 10/17/2023] rosuvastatin (Crestor) tablet 10 mg, 10 mg, Oral, DAILY, Dangelo Acevedo M.D.    Current Outpatient Medications:     metFORMIN ER (GLUCOPHAGE XR) 500 MG TABLET SR 24 HR, Take 1,000 mg by mouth 2 times a day. 1,000 mg = 2 tabs, Disp: , Rfl:     metoprolol tartrate (LOPRESSOR) 50 MG Tab, Take 50 mg by mouth 2 times a day., Disp: , Rfl:     rosuvastatin (CRESTOR) 10 MG Tab, Take 10 mg by mouth every day., Disp: , Rfl:     apixaban (ELIQUIS) 5mg Tab, Take 5 mg by mouth 2 times a day., Disp: , Rfl:     Probiotic Product (PROBIOTIC PO), Take 1 Tablet by mouth every day., Disp: , Rfl:     PHYSICAL EXAM:  Vitals:    10/16/23 1540 10/16/23 1548 10/16/23 1856 10/16/23 1936   BP: (!) 129/101  (!) 161/97 (!) 178/121   Pulse: 73  (!) 130 (!) 135   Resp: 16  18 (!) 21   Temp: 35.8 °C (96.5 °F)  36 °C (96.8 °F)    TempSrc: Temporal  Temporal    SpO2: 94%  92% 89%   Weight:  121 kg (267 lb 10.2 oz)     Height:  1.829 m (6')     , Temp (24hrs), Av.9 °C (96.7 °F), Min:35.8 °C (96.5 °F), Max:36 °C (96.8 °F)  , Pulse Oximetry: 89 %    General: Pt resting in NAD, cooperative   Skin:  Pink, warm and dry.  No rashes  HEENT: NC/AT. PERRL. EOMI. MMM. No nasal discharge. Oropharynx nonerythematous without exudate/plaques  Neck:  Supple without lymphadenopathy or rigidity.  Lungs:  Symmetrical.  CTAB with no adventitious breath sounds.  Good air movement   Cardiovascular:  Normal S1/S2, RRR without M/R/G.  Abdomen:  BS+, Soft, NT/ND. No masses noted.  Extremities:  Full range of motion. No gross deformities noted. 2+ pulses in all extremities. No C/C/E   Spine:  Straight without vertebral anomalies.  CNS:  A&Ox4, follows commands, Strength 5/5 in all extremities.         LAB TESTS:   Recent  Labs     10/16/23  1625   WBC 8.5   RBC 4.68*   HEMOGLOBIN 14.4   HEMATOCRIT 44.6   MCV 95.3   MCH 30.8   RDW 44.8   PLATELETCT 233   MPV 10.5   NEUTSPOLYS 65.50   LYMPHOCYTES 26.20   MONOCYTES 6.80   EOSINOPHILS 0.60   BASOPHILS 0.50         Recent Labs     10/16/23  1625   SODIUM 139   POTASSIUM 4.9   CHLORIDE 106   CO2 23   BUN 19   CREATININE 0.96   CALCIUM 9.2   ALBUMIN 4.1       CULTURES:   Results       ** No results found for the last 168 hours. **            IMAGES:  DX-CHEST-PORTABLE (1 VIEW)   Final Result      1.  Bilateral atelectasis and enlargement the cardiac silhouette      2.  Probable small bilateral pleural effusion      EC-ECHOCARDIOGRAM COMPLETE W/O CONT    (Results Pending)       ASSESSMENT/PLAN: 58 y.o. male admitted for hypervolemia, possible CHF, A-fib.    Hypervolemia  Patient presents the ER with fluid overload, including orthopnea and bilateral lower extremity pitting edema with 10 pounds of weight gain over the last several days.  He also reports dyspnea on exertion.  He breathes well at rest.  Patient does have history of heavy alcohol use, quitting 3 weeks ago.  NT-proBNP was elevated at 6000.  He has never seen a cardiologist.  Symptoms are highly suggestive of heart failure exacerbation.  Patient is at risk for liver disease based on alcohol use history, however transaminases, alk phos, bilirubin all normal on labs, and physical exam suggestive more of heart failure than liver failure.  -Echocardiogram  -Lasix 20 IV BID  -I's and O's  -Continue home Beta-blocker  -Continue home statin  -ARB  -SGLT2i on discharge      Has recently quit alcohol use  Patient quit drinking 3 weeks ago.  He previously had 1 year of sobriety.  He is currently using marijuana Gummies.  He has not gone to groups although he has tried a few AA meetings, but is not interested in going back.  Labs are not suggestive of liver failure (PLT, albumin, bili, liver enzymes all WNL).         A-fib (HCC)  Patient has  recently diagnosed A-fib.  CHA2DS2-VASc=3.  He was started on Eliquis outpatient.  -Continue Eliquis  -Consider cardiology consult in the morning.  -Continue home metoprolol tartrate 50 BID      Core Measures:  Fluids: none, diuresing  Lines: PIV  Abx: none  Diet: cardiac  PPX: anticoagulated  DISPO: inpatient tele    CODE STATUS: Full Code      Dangelo Acevedo MD  PGY3  [facility]

## 2023-10-17 NOTE — ED NOTES
Pt medicated per MAR and provided turkey sandwich. Pt transported to floor with ACLS RN x2 connected to [facility]. All belongings and chart transported with pt. Wife at bedside.

## 2023-10-17 NOTE — ASSESSMENT & PLAN NOTE
Patient has recently diagnosed A-fib.  CHA2DS2-VASc=3.  He was started on Eliquis outpatient.  Patient's heart rate has been rate controlled however when in room with patient and he laughs or becomes active heart rate does increase to the low 100s a few times into the 110s.    -Amiodarone drip with plan for repeat cardioversion 10/22/23  - Continue Eliquis  - appreciate cards reccs  - cont Metoprolol, up titrate prn  - Telemetry

## 2023-10-17 NOTE — DISCHARGE PLANNING
[facility] TCN chart review completed. Due to high 6 click scores of 24 for ADLs and 24 for mobility as well as patients currently documented level of function, no TCN needs anticipated in patient discharge planning at this time. Should post acute discharge needs arise warranting TCN involvement, please contact TCN team via Voalte. Thank you.

## 2023-10-18 LAB
ALBUMIN SERPL BCP-MCNC: 4.1 G/DL (ref 3.2–4.9)
ALBUMIN/GLOB SERPL: 2 G/DL
ALP SERPL-CCNC: 56 U/L (ref 30–99)
ALT SERPL-CCNC: 35 U/L (ref 2–50)
ANION GAP SERPL CALC-SCNC: 11 MMOL/L (ref 7–16)
AST SERPL-CCNC: 25 U/L (ref 12–45)
BASOPHILS # BLD AUTO: 0.4 % (ref 0–1.8)
BASOPHILS # BLD: 0.03 K/UL (ref 0–0.12)
BILIRUB SERPL-MCNC: 0.6 MG/DL (ref 0.1–1.5)
BUN SERPL-MCNC: 18 MG/DL (ref 8–22)
CALCIUM ALBUM COR SERPL-MCNC: 9 MG/DL (ref 8.5–10.5)
CALCIUM SERPL-MCNC: 9.1 MG/DL (ref 8.5–10.5)
CHLORIDE SERPL-SCNC: 104 MMOL/L (ref 96–112)
CO2 SERPL-SCNC: 27 MMOL/L (ref 20–33)
CREAT SERPL-MCNC: 1.18 MG/DL (ref 0.5–1.4)
EOSINOPHIL # BLD AUTO: 0.07 K/UL (ref 0–0.51)
EOSINOPHIL NFR BLD: 1 % (ref 0–6.9)
ERYTHROCYTE [DISTWIDTH] IN BLOOD BY AUTOMATED COUNT: 43.9 FL (ref 35.9–50)
GFR SERPLBLD CREATININE-BSD FMLA CKD-EPI: 71 ML/MIN/1.73 M 2
GLOBULIN SER CALC-MCNC: 2.1 G/DL (ref 1.9–3.5)
GLUCOSE SERPL-MCNC: 114 MG/DL (ref 65–99)
HCT VFR BLD AUTO: 41.7 % (ref 42–52)
HGB BLD-MCNC: 13.6 G/DL (ref 14–18)
IMM GRANULOCYTES # BLD AUTO: 0.02 K/UL (ref 0–0.11)
IMM GRANULOCYTES NFR BLD AUTO: 0.3 % (ref 0–0.9)
LYMPHOCYTES # BLD AUTO: 2.32 K/UL (ref 1–4.8)
LYMPHOCYTES NFR BLD: 32 % (ref 22–41)
MCH RBC QN AUTO: 31.1 PG (ref 27–33)
MCHC RBC AUTO-ENTMCNC: 32.6 G/DL (ref 32.3–36.5)
MCV RBC AUTO: 95.4 FL (ref 81.4–97.8)
MONOCYTES # BLD AUTO: 0.47 K/UL (ref 0–0.85)
MONOCYTES NFR BLD AUTO: 6.5 % (ref 0–13.4)
NEUTROPHILS # BLD AUTO: 4.33 K/UL (ref 1.82–7.42)
NEUTROPHILS NFR BLD: 59.8 % (ref 44–72)
NRBC # BLD AUTO: 0 K/UL
NRBC BLD-RTO: 0 /100 WBC (ref 0–0.2)
PLATELET # BLD AUTO: 203 K/UL (ref 164–446)
PMV BLD AUTO: 11 FL (ref 9–12.9)
POTASSIUM SERPL-SCNC: 4 MMOL/L (ref 3.6–5.5)
PROT SERPL-MCNC: 6.2 G/DL (ref 6–8.2)
RBC # BLD AUTO: 4.37 M/UL (ref 4.7–6.1)
SODIUM SERPL-SCNC: 142 MMOL/L (ref 135–145)
T4 FREE SERPL-MCNC: 1.65 NG/DL (ref 0.93–1.7)
TSH SERPL DL<=0.005 MIU/L-ACNC: 2.01 UIU/ML (ref 0.38–5.33)
WBC # BLD AUTO: 7.2 K/UL (ref 4.8–10.8)

## 2023-10-18 PROCEDURE — 84439 ASSAY OF FREE THYROXINE: CPT

## 2023-10-18 PROCEDURE — 84443 ASSAY THYROID STIM HORMONE: CPT

## 2023-10-18 PROCEDURE — 80053 COMPREHEN METABOLIC PANEL: CPT

## 2023-10-18 PROCEDURE — A9270 NON-COVERED ITEM OR SERVICE: HCPCS

## 2023-10-18 PROCEDURE — 85025 COMPLETE CBC W/AUTO DIFF WBC: CPT

## 2023-10-18 PROCEDURE — 99232 SBSQ HOSP IP/OBS MODERATE 35: CPT | Mod: GC | Performed by: FAMILY MEDICINE

## 2023-10-18 PROCEDURE — 700111 HCHG RX REV CODE 636 W/ 250 OVERRIDE (IP): Mod: JZ

## 2023-10-18 PROCEDURE — 770020 HCHG ROOM/CARE - TELE (206)

## 2023-10-18 PROCEDURE — 700102 HCHG RX REV CODE 250 W/ 637 OVERRIDE(OP)

## 2023-10-18 PROCEDURE — 99222 1ST HOSP IP/OBS MODERATE 55: CPT | Performed by: INTERNAL MEDICINE

## 2023-10-18 PROCEDURE — 36415 COLL VENOUS BLD VENIPUNCTURE: CPT

## 2023-10-18 RX ORDER — FUROSEMIDE 10 MG/ML
40 INJECTION INTRAMUSCULAR; INTRAVENOUS
Status: DISCONTINUED | OUTPATIENT
Start: 2023-10-18 | End: 2023-10-19

## 2023-10-18 RX ORDER — METOPROLOL SUCCINATE 25 MG/1
100 TABLET, EXTENDED RELEASE ORAL
Status: DISCONTINUED | OUTPATIENT
Start: 2023-10-18 | End: 2023-10-22 | Stop reason: HOSPADM

## 2023-10-18 RX ADMIN — DOCUSATE SODIUM 50 MG AND SENNOSIDES 8.6 MG 2 TABLET: 8.6; 5 TABLET, FILM COATED ORAL at 05:26

## 2023-10-18 RX ADMIN — APIXABAN 5 MG: 5 TABLET, FILM COATED ORAL at 05:26

## 2023-10-18 RX ADMIN — APIXABAN 5 MG: 5 TABLET, FILM COATED ORAL at 16:33

## 2023-10-18 RX ADMIN — FUROSEMIDE 40 MG: 10 INJECTION, SOLUTION INTRAVENOUS at 05:51

## 2023-10-18 RX ADMIN — METFORMIN HYDROCHLORIDE 1000 MG: 500 TABLET, EXTENDED RELEASE ORAL at 05:26

## 2023-10-18 RX ADMIN — ROSUVASTATIN CALCIUM 10 MG: 5 TABLET, FILM COATED ORAL at 05:26

## 2023-10-18 RX ADMIN — METFORMIN HYDROCHLORIDE 1000 MG: 500 TABLET, EXTENDED RELEASE ORAL at 17:51

## 2023-10-18 RX ADMIN — FUROSEMIDE 40 MG: 10 INJECTION, SOLUTION INTRAVENOUS at 16:33

## 2023-10-18 RX ADMIN — LOSARTAN POTASSIUM 25 MG: 50 TABLET, FILM COATED ORAL at 05:25

## 2023-10-18 RX ADMIN — METOPROLOL TARTRATE 50 MG: 25 TABLET, FILM COATED ORAL at 05:25

## 2023-10-18 ASSESSMENT — FIBROSIS 4 INDEX: FIB4 SCORE: 1.21

## 2023-10-18 ASSESSMENT — PAIN DESCRIPTION - PAIN TYPE: TYPE: ACUTE PAIN

## 2023-10-18 NOTE — CARE PLAN
The patient is Stable - Low risk of patient condition declining or worsening    Shift Goals  Clinical Goals: wean O2, ambulate, up to chair for meals  Patient Goals: off O2, plan of care  Family Goals: plan of care, communication    Progress made toward(s) clinical / shift goals:      Problem: Knowledge Deficit - Standard  Goal: Patient and family/care givers will demonstrate understanding of plan of care, disease process/condition, diagnostic tests and medications  Outcome: Progressing       Patient is not progressing towards the following goals:

## 2023-10-18 NOTE — PROGRESS NOTES
[facility] [place] [facility] PROGRESS NOTE     Attending: Chaparro Archer M.d.    Senior Resident: Alex Castro Md      PATIENT: Harjit Pantoja; [id number]; 1965    ID: 58 y.o. male admitted for worsening edema in the setting of recently diagnosed A. Fib     SUBJECTIVE: No acute events overnight. Pt off o2, feeling much better, reports decreased swelling, breathing improved, no palpitation or cp.     OBJECTIVE:     Vitals:    10/18/23 0336 10/18/23 0500 10/18/23 0752 10/18/23 1216   BP: 125/88  (!) 125/90 (!) 133/95   Pulse: 98  (!) 107 (!) 102   Resp: 16 16 18   Temp: 36.2 °C (97.1 °F)  36 °C (96.8 °F) 36 °C (96.8 °F)   TempSrc: Temporal  Temporal Temporal   SpO2: 92%  93% 95%   Weight:  117 kg (257 lb 15 oz)     Height:           Intake/Output Summary (Last 24 hours) at 10/17/2023 1201  Last data filed at 10/17/2023 0600  Gross per 24 hour   Intake 400 ml   Output 1350 ml   Net -950 ml       PE:   General: No acute distress, resting comfortably in bed.  HEENT: NC/AT. PERRLA. EOMI. MMM  Cardiovascular: Normal S1/S2, irregularly irregular, no m/r/g  Respiratory: Symmetric inspiratory effort. CTAB with no adventitious sounds  Abdomen: BS+, soft, NT/ND   EXT:  BAIG, 5/5 strength, 2+ pulses, no rashes, bruising, or bleeding. 2+pitting edema bilateral legs to below knee improving since yesterday  Neuro: Non focal with no numbness, tingling or changes in sensation    LABS:  Recent Labs     10/16/23  1625 10/17/23  0041 10/18/23  0048   WBC 8.5 7.9 7.2   RBC 4.68* 4.42* 4.37*   HEMOGLOBIN 14.4 13.8* 13.6*   HEMATOCRIT 44.6 42.0 41.7*   MCV 95.3 95.0 95.4   MCH 30.8 31.2 31.1   RDW 44.8 44.3 43.9   PLATELETCT 233 211 203   MPV 10.5 11.0 11.0   NEUTSPOLYS 65.50 66.70 59.80   LYMPHOCYTES 26.20 24.10 32.00   MONOCYTES 6.80 7.60 6.50   EOSINOPHILS 0.60 0.80 1.00   BASOPHILS 0.50 0.50 0.40     Recent Labs     10/16/23  1625 10/17/23  0041 10/18/23  0048   SODIUM 139 141 142   POTASSIUM 4.9 4.6 4.0   CHLORIDE 106 107 104   CO2  "23 26 27   BUN 19 18 18   CREATININE 0.96 1.06 1.18   CALCIUM 9.2 8.8 9.1   MAGNESIUM  --  1.9  --    ALBUMIN 4.1 3.7 4.1     Estimated GFR/CRCL = Estimated Creatinine Clearance: 90.1 mL/min (by C-G formula based on SCr of 1.18 mg/dL).  Recent Labs     10/16/23  1625 10/17/23  0041 10/18/23  0048   GLUCOSE 108* 133* 114*     Recent Labs     10/16/23  1625 10/17/23  0041 10/18/23  0048   ASTSGOT 25 21 25   ALTSGPT 40 39 35   TBILIRUBIN 0.6 0.6 0.6   ALKPHOSPHAT 53 48 56   GLOBULIN 2.3 2.1 2.1             No results for input(s): \"INR\", \"APTT\", \"FIBRINOGEN\" in the last 72 hours.    Invalid input(s): \"DIMER\"    MICROBIOLOGY:   Results       ** No results found for the last 168 hours. **              IMAGING:   EC-ECHOCARDIOGRAM COMPLETE W/ CONT   Final Result      DX-CHEST-PORTABLE (1 VIEW)   Final Result      1.  Bilateral atelectasis and enlargement the cardiac silhouette      2.  Probable small bilateral pleural effusion          MEDS:  Current Facility-Administered Medications   Medication Last Admin    furosemide (Lasix) injection 40 mg 40 mg at 10/18/23 0551    metoprolol SR (Toprol XL) tablet 100 mg      senna-docusate (Pericolace Or Senokot S) 8.6-50 MG per tablet 2 Tablet 2 Tablet at 10/18/23 0526    And    polyethylene glycol/lytes (Miralax) PACKET 1 Packet      And    magnesium hydroxide (Milk Of Magnesia) suspension 30 mL      And    bisacodyl (Dulcolax) suppository 10 mg      acetaminophen (Tylenol) tablet 650 mg      labetalol (Normodyne/Trandate) injection 10 mg      apixaban (Eliquis) tablet 5 mg 5 mg at 10/18/23 0526    metFORMIN ER (Glucophage XR) tablet 1,000 mg 1,000 mg at 10/18/23 0526    rosuvastatin (Crestor) tablet 10 mg 10 mg at 10/18/23 0526    losartan (Cozaar) tablet 25 mg 25 mg at 10/18/23 0525       ASSESSMENT/PLAN: Harjit Pantoja is a 58 y.o. male admitted for worsening edema in the setting of recently diagnosed atrial fibrillation.    * Hypervolemia- (present on admission)  Assessment " & Plan  #CHF new dx   #hypoxia (resolved)    Patient presents the ER with fluid overload, including orthopnea and bilateral lower extremity pitting edema with 10 pounds of weight gain over the last several days.  He also reports dyspnea on exertion.  He breathes well at rest.  Patient does have history of heavy alcohol use, quitting 3 weeks ago.  NT-proBNP was elevated at 6000.  He has never seen a cardiologist.  Symptoms are highly suggestive of heart failure exacerbation.  Patient is at risk for liver disease based on alcohol use history, however transaminases, alk phos, bilirubin all normal on labs, and physical exam suggestive more of heart failure than liver failure. TSH unremarkable    -Echo completed 10/17, shows EF 30% and global hypokenisis  -appreciate card's reccs below:     =Continue IV diuresis until euvolemic    =Continue losartan 25 mg daily    =Continue Toprol XL, uptitrate as tolerated for rate control    =Continue oral anticoagulation with Eliquis    =Continue statin therapy    =Initiate SGLT2 inhibitor    =Initiate spironolactone    =Abstinence from tobacco and alcohol  - Strict I's and O's  - Continue home statin  -continue vs monitoring      Type 2 diabetes mellitus (HCC)  Assessment & Plan  History of diabetes.  Last A1c noted to be 5.9 in December 2022. Treated with metformin.   A1c 6.4 this admission    -Continue home metformin  -educate on lifestyle changes I.e. diet and exercise    A-fib (HCC)  Assessment & Plan  Patient has recently diagnosed A-fib.  CHA2DS2-VASc=3.  He was started on Eliquis outpatient.  Patient's heart rate has been rate controlled however when in room with patient and he laughs or becomes active heart rate does increase to the low 100s a few times into the 110s.    - Continue Eliquis  - appreciate cards reccs  - cont Metoprolol, up titrate prn  - Telemetry      Has recently quit alcohol use  Assessment & Plan  Patient quit drinking 3 weeks ago.  He previously had 1 year  of sobriety.  He is currently using marijuana Gummies.  He has not gone to groups although he has tried a few AA meetings, but is not interested in going back.  Labs are not suggestive of liver failure (PLT, albumin, bili, liver enzymes all WNL).  Patient was with wife today who reports that he is highly motivated to not have any alcoholic beverages.          Core Measures:  Fluids: None  Lines: PIV  Abx: None  Diet: Diabetic with 2 g salt restriction and 2 L fluid restriction  PPX: SCDs and Eliquis  DISPO: Patient to remain inpatient for further medical management      CODE STATUS: Full

## 2023-10-18 NOTE — CARE PLAN
The patient is Stable - Low risk of patient condition declining or worsening    Shift Goals  Clinical Goals: wean O2, ambulate, up to chair for meals  Patient Goals: off O2, plan of care  Family Goals: plan of care, communication    Progress made toward(s) clinical / shift goals:   room air throughout shift.     Patient is not progressing towards the following goals:      Problem: Knowledge Deficit - Standard  Goal: Patient and family/care givers will demonstrate understanding of plan of care, disease process/condition, diagnostic tests and medications  10/18/2023 1556 by Giovanna Bob R.N.  Outcome: Progressing  Note: Plan of care reviewed with patient and spouse at bedside.   10/18/2023 1556 by Giovanna Bob R.N.  Outcome: Progressing     Problem: Care Map:  Admission Optimal Outcome for the Heart Failure Patient  Goal: Admission:  Optimal Care of the heart failure patient  10/18/2023 1556 by Giovanna Bob R.N.  Outcome: Progressing  Note: Strict I's & O's , fluid restrictions and daily weights in place. Pt. Ambulating around [place] with no complaints of SOB.   10/18/2023 1556 by Giovanna Bob R.N.  Outcome: Progressing

## 2023-10-18 NOTE — CONSULTS
Reason of Consult: Atrial fibrillation and CHF    Consulting Physician: Dr. Shaina Bustos    HPI:  58-year-old male with a history of alcohol abuse and tobacco abuse indicating he recently quit within the month presents with dyspnea on exertion and progressive orthopnea for several weeks.  Diagnosed 1 week prior to presentation with atrial fibrillation and initiated on outpatient therapy.  Since arrival he is achieving effective diuresis slowly, slowly improved symptomology.  Heart rates are appropriate and blood pressure is maintained.  He has been initiated on medical therapy.  Denies drug use and UDS is negative.  No family history precocious CAD.    Past Medical History:   Diagnosis Date    Cold 12/7/11    stomach flu 12/3/11    Snoring        Social History     Socioeconomic History    Marital status:      Spouse name: Not on file    Number of children: Not on file    Years of education: Not on file    Highest education level: Not on file   Occupational History    Not on file   Tobacco Use    Smoking status: Every Day     Current packs/day: 1.00     Average packs/day: 1 pack/day for 10.0 years (10.0 ttl pk-yrs)     Types: Cigarettes    Smokeless tobacco: Current   Vaping Use    Vaping Use: Former    Quit date: 12/2/2019   Substance and Sexual Activity    Alcohol use: Yes     Alcohol/week: 2.4 oz     Types: 4 Standard drinks or equivalent per week    Drug use: Yes     Types: Marijuana, Oral     Comment: one every two weeks.     Sexual activity: Not on file   Other Topics Concern    Not on file   Social History Narrative    Not on file     Social Determinants of Health     Financial Resource Strain: Not on file   Food Insecurity: Not on file   Transportation Needs: Not on file   Physical Activity: Not on file   Stress: Not on file   Social Connections: Not on file   Intimate Partner Violence: Not on file   Housing Stability: Not on file       No current facility-administered medications on file prior to  encounter.     Current Outpatient Medications on File Prior to Encounter   Medication Sig Dispense Refill    metFORMIN ER (GLUCOPHAGE XR) 500 MG TABLET SR 24 HR Take 1,000 mg by mouth 2 times a day. 1,000 mg = 2 tabs      metoprolol tartrate (LOPRESSOR) 50 MG Tab Take 50 mg by mouth 2 times a day.      rosuvastatin (CRESTOR) 10 MG Tab Take 10 mg by mouth every day.      apixaban (ELIQUIS) 5mg Tab Take 5 mg by mouth 2 times a day.      Probiotic Product (PROBIOTIC PO) Take 1 Tablet by mouth every day.         Current Facility-Administered Medications   Medication Dose Frequency Provider Last Rate Last Admin    furosemide (Lasix) injection 40 mg  40 mg BID DIURETIC Shaina Bustos M.D.   40 mg at 10/18/23 0551    metoprolol SR (Toprol XL) tablet 100 mg  100 mg Q DAY Shaina Bustos M.D.        senna-docusate (Pericolace Or Senokot S) 8.6-50 MG per tablet 2 Tablet  2 Tablet BID Dangelo Acevedo M.D.   2 Tablet at 10/18/23 0526    And    polyethylene glycol/lytes (Miralax) PACKET 1 Packet  1 Packet QDAY PRN Dangelo Acevedo M.D.        And    magnesium hydroxide (Milk Of Magnesia) suspension 30 mL  30 mL QDAY PRN Dangelo Acevedo M.D.        And    bisacodyl (Dulcolax) suppository 10 mg  10 mg QDAY PRN Dangelo Acevedo M.D.        acetaminophen (Tylenol) tablet 650 mg  650 mg Q6HRS PRN Dangelo Acevedo M.D.        labetalol (Normodyne/Trandate) injection 10 mg  10 mg Q4HRS PRN Dangelo Acevedo M.D.        apixaban (Eliquis) tablet 5 mg  5 mg BID Dangelo Acevedo M.D.   5 mg at 10/18/23 0526    metFORMIN ER (Glucophage XR) tablet 1,000 mg  1,000 mg BID Dangelo Acevedo M.D.   1,000 mg at 10/18/23 0526    rosuvastatin (Crestor) tablet 10 mg  10 mg DAILY Dangelo Acevedo M.D.   10 mg at 10/18/23 0526    losartan (Cozaar) tablet 25 mg  25 mg Q DAY Dangelo Acevedo M.D.   25 mg at 10/18/23 0525   Last reviewed on 10/16/2023  9:27 PM by Tanya Chang R.N.     Cleocin [clindamycin hcl] and Pcn [penicillins]    History  "reviewed. No pertinent family history.    ROS: As per HPI all other systems reviewed and negative     Physical Exam   Blood pressure (!) 125/90, pulse (!) 107, temperature 36 °C (96.8 °F), temperature source Temporal, resp. rate 16, height 1.829 m (6' 0.01\"), weight 117 kg (257 lb 15 oz), SpO2 93 %.    Constitutional:  Appears well-developed.   HENT: Normocephalic and atraumatic. No scleral icterus.   Neck: No JVD present.   Cardiovascular: Normal rate, irregular.   Pulmonary/Chest: Normal chest rise  Abdominal: S/NT/ND BS+   Musculoskeletal:  Pulses present. No atrophy. Strength normal.  Extremities: Exhibits no edema. No clubbing or cyanosis.   Skin: Skin is warm and dry.   Neuro: Non-focal, CN 2-12 intact grossly      Intake/Output Summary (Last 24 hours) at 10/18/2023 0938  Last data filed at 10/18/2023 0842  Gross per 24 hour   Intake 1060 ml   Output 1925 ml   Net -865 ml       Recent Labs     10/16/23  1625 10/17/23  0041 10/18/23  0048   WBC 8.5 7.9 7.2   RBC 4.68* 4.42* 4.37*   HEMOGLOBIN 14.4 13.8* 13.6*   HEMATOCRIT 44.6 42.0 41.7*   MCV 95.3 95.0 95.4   MCH 30.8 31.2 31.1   MCHC 32.3 32.9 32.6   RDW 44.8 44.3 43.9   PLATELETCT 233 211 203   MPV 10.5 11.0 11.0     Recent Labs     10/16/23  1625 10/17/23  0041 10/18/23  0048   SODIUM 139 141 142   POTASSIUM 4.9 4.6 4.0   CHLORIDE 106 107 104   CO2 23 26 27   GLUCOSE 108* 133* 114*   BUN 19 18 18   CREATININE 0.96 1.06 1.18   CALCIUM 9.2 8.8 9.1                         Imaging reviewed    Impressions:  1.  Acute decompensated heart failure with reduced ejection fraction  2.  Atrial fibrillation rapid ventricular response  3.  Alcohol abuse  4.  Tobacco abuse  5.  Hypertension  6.  New diagnosis of cardiomyopathy, LVEF 30%  7.  Type 2 diabetes mellitus      Recommendations:  Overall likely combination of alcoholic and tachycardia mediated cardiomyopathy however an ischemic component should be evaluated and ruled out given his underlying risk factors.  " Recommend the followin.  Continue IV diuresis until euvolemic  2.  Continue losartan 25 mg daily  3.  Continue Toprol XL, uptitrate as tolerated for rate control  4.  Continue oral anticoagulation with Eliquis  5.  Continue statin therapy  6.  Initiate SGLT2 inhibitor  7.  Initiate spironolactone  8.  Abstinence from tobacco and alcohol    Plan for rate control, subsequent cardioversion and ischemic evaluation with coronary angiography as an outpatient.    Discussed with the referring physician and bedside nursing.

## 2023-10-19 ENCOUNTER — ANESTHESIA EVENT (OUTPATIENT)
Dept: CARDIOLOGY | Facility: MEDICAL CENTER | Age: 58
DRG: 291 | End: 2023-10-19
Payer: COMMERCIAL

## 2023-10-19 ENCOUNTER — APPOINTMENT (OUTPATIENT)
Dept: CARDIOLOGY | Facility: MEDICAL CENTER | Age: 58
DRG: 291 | End: 2023-10-19
Attending: PHYSICIAN ASSISTANT
Payer: COMMERCIAL

## 2023-10-19 ENCOUNTER — ANESTHESIA (OUTPATIENT)
Dept: CARDIOLOGY | Facility: MEDICAL CENTER | Age: 58
DRG: 291 | End: 2023-10-19
Payer: COMMERCIAL

## 2023-10-19 ENCOUNTER — PATIENT OUTREACH (OUTPATIENT)
Dept: SCHEDULING | Facility: IMAGING CENTER | Age: 58
End: 2023-10-19
Payer: COMMERCIAL

## 2023-10-19 LAB
ALBUMIN SERPL BCP-MCNC: 3.9 G/DL (ref 3.2–4.9)
ALBUMIN/GLOB SERPL: 2 G/DL
ALP SERPL-CCNC: 55 U/L (ref 30–99)
ALT SERPL-CCNC: 29 U/L (ref 2–50)
ANION GAP SERPL CALC-SCNC: 10 MMOL/L (ref 7–16)
AST SERPL-CCNC: 20 U/L (ref 12–45)
BILIRUB SERPL-MCNC: 0.6 MG/DL (ref 0.1–1.5)
BUN SERPL-MCNC: 14 MG/DL (ref 8–22)
CALCIUM ALBUM COR SERPL-MCNC: 8.9 MG/DL (ref 8.5–10.5)
CALCIUM SERPL-MCNC: 8.8 MG/DL (ref 8.5–10.5)
CHLORIDE SERPL-SCNC: 105 MMOL/L (ref 96–112)
CO2 SERPL-SCNC: 27 MMOL/L (ref 20–33)
CREAT SERPL-MCNC: 0.95 MG/DL (ref 0.5–1.4)
EKG IMPRESSION: NORMAL
EKG IMPRESSION: NORMAL
GFR SERPLBLD CREATININE-BSD FMLA CKD-EPI: 93 ML/MIN/1.73 M 2
GLOBULIN SER CALC-MCNC: 2 G/DL (ref 1.9–3.5)
GLUCOSE SERPL-MCNC: 117 MG/DL (ref 65–99)
POTASSIUM SERPL-SCNC: 4.1 MMOL/L (ref 3.6–5.5)
PROT SERPL-MCNC: 5.9 G/DL (ref 6–8.2)
SODIUM SERPL-SCNC: 142 MMOL/L (ref 135–145)

## 2023-10-19 PROCEDURE — B24BZZ4 ULTRASONOGRAPHY OF HEART WITH AORTA, TRANSESOPHAGEAL: ICD-10-PCS | Performed by: INTERNAL MEDICINE

## 2023-10-19 PROCEDURE — A9270 NON-COVERED ITEM OR SERVICE: HCPCS

## 2023-10-19 PROCEDURE — 4410588 CL-CARDIOVERSION

## 2023-10-19 PROCEDURE — 700111 HCHG RX REV CODE 636 W/ 250 OVERRIDE (IP): Mod: JZ

## 2023-10-19 PROCEDURE — 700105 HCHG RX REV CODE 258: Performed by: STUDENT IN AN ORGANIZED HEALTH CARE EDUCATION/TRAINING PROGRAM

## 2023-10-19 PROCEDURE — 93325 DOPPLER ECHO COLOR FLOW MAPG: CPT

## 2023-10-19 PROCEDURE — 160002 HCHG RECOVERY MINUTES (STAT)

## 2023-10-19 PROCEDURE — 160035 HCHG PACU - 1ST 60 MINS PHASE I

## 2023-10-19 PROCEDURE — 93010 ELECTROCARDIOGRAM REPORT: CPT | Mod: 59,76 | Performed by: INTERNAL MEDICINE

## 2023-10-19 PROCEDURE — 700111 HCHG RX REV CODE 636 W/ 250 OVERRIDE (IP): Performed by: INTERNAL MEDICINE

## 2023-10-19 PROCEDURE — 770020 HCHG ROOM/CARE - TELE (206)

## 2023-10-19 PROCEDURE — 93005 ELECTROCARDIOGRAM TRACING: CPT | Performed by: NURSE PRACTITIONER

## 2023-10-19 PROCEDURE — 36415 COLL VENOUS BLD VENIPUNCTURE: CPT

## 2023-10-19 PROCEDURE — 93005 ELECTROCARDIOGRAM TRACING: CPT | Performed by: INTERNAL MEDICINE

## 2023-10-19 PROCEDURE — 700102 HCHG RX REV CODE 250 W/ 637 OVERRIDE(OP)

## 2023-10-19 PROCEDURE — 700105 HCHG RX REV CODE 258: Performed by: INTERNAL MEDICINE

## 2023-10-19 PROCEDURE — 99233 SBSQ HOSP IP/OBS HIGH 50: CPT | Performed by: INTERNAL MEDICINE

## 2023-10-19 PROCEDURE — 5A2204Z RESTORATION OF CARDIAC RHYTHM, SINGLE: ICD-10-PCS | Performed by: INTERNAL MEDICINE

## 2023-10-19 PROCEDURE — 80053 COMPREHEN METABOLIC PANEL: CPT

## 2023-10-19 PROCEDURE — A9270 NON-COVERED ITEM OR SERVICE: HCPCS | Performed by: INTERNAL MEDICINE

## 2023-10-19 PROCEDURE — 700111 HCHG RX REV CODE 636 W/ 250 OVERRIDE (IP): Performed by: STUDENT IN AN ORGANIZED HEALTH CARE EDUCATION/TRAINING PROGRAM

## 2023-10-19 PROCEDURE — A9270 NON-COVERED ITEM OR SERVICE: HCPCS | Performed by: NURSE PRACTITIONER

## 2023-10-19 PROCEDURE — 700102 HCHG RX REV CODE 250 W/ 637 OVERRIDE(OP): Performed by: INTERNAL MEDICINE

## 2023-10-19 PROCEDURE — 93010 ELECTROCARDIOGRAM REPORT: CPT | Mod: 59 | Performed by: INTERNAL MEDICINE

## 2023-10-19 PROCEDURE — 700101 HCHG RX REV CODE 250: Performed by: STUDENT IN AN ORGANIZED HEALTH CARE EDUCATION/TRAINING PROGRAM

## 2023-10-19 PROCEDURE — 99232 SBSQ HOSP IP/OBS MODERATE 35: CPT | Mod: GC | Performed by: FAMILY MEDICINE

## 2023-10-19 PROCEDURE — 700102 HCHG RX REV CODE 250 W/ 637 OVERRIDE(OP): Performed by: NURSE PRACTITIONER

## 2023-10-19 PROCEDURE — 92960 CARDIOVERSION ELECTRIC EXT: CPT | Performed by: INTERNAL MEDICINE

## 2023-10-19 RX ORDER — SPIRONOLACTONE 25 MG/1
25 TABLET ORAL
Status: DISCONTINUED | OUTPATIENT
Start: 2023-10-19 | End: 2023-10-22 | Stop reason: HOSPADM

## 2023-10-19 RX ORDER — DIPHENHYDRAMINE HYDROCHLORIDE 50 MG/ML
12.5 INJECTION INTRAMUSCULAR; INTRAVENOUS
Status: DISCONTINUED | OUTPATIENT
Start: 2023-10-19 | End: 2023-10-19 | Stop reason: HOSPADM

## 2023-10-19 RX ORDER — HYDROMORPHONE HYDROCHLORIDE 1 MG/ML
0.4 INJECTION, SOLUTION INTRAMUSCULAR; INTRAVENOUS; SUBCUTANEOUS
Status: DISCONTINUED | OUTPATIENT
Start: 2023-10-19 | End: 2023-10-19 | Stop reason: HOSPADM

## 2023-10-19 RX ORDER — LOSARTAN POTASSIUM 50 MG/1
25 TABLET ORAL ONCE
Status: DISCONTINUED | OUTPATIENT
Start: 2023-10-19 | End: 2023-10-20

## 2023-10-19 RX ORDER — LOSARTAN POTASSIUM 50 MG/1
50 TABLET ORAL
Status: DISCONTINUED | OUTPATIENT
Start: 2023-10-20 | End: 2023-10-22

## 2023-10-19 RX ORDER — HYDROMORPHONE HYDROCHLORIDE 1 MG/ML
0.2 INJECTION, SOLUTION INTRAMUSCULAR; INTRAVENOUS; SUBCUTANEOUS
Status: DISCONTINUED | OUTPATIENT
Start: 2023-10-19 | End: 2023-10-19 | Stop reason: HOSPADM

## 2023-10-19 RX ORDER — SODIUM CHLORIDE, SODIUM LACTATE, POTASSIUM CHLORIDE, CALCIUM CHLORIDE 600; 310; 30; 20 MG/100ML; MG/100ML; MG/100ML; MG/100ML
INJECTION, SOLUTION INTRAVENOUS CONTINUOUS
Status: DISCONTINUED | OUTPATIENT
Start: 2023-10-19 | End: 2023-10-19 | Stop reason: HOSPADM

## 2023-10-19 RX ORDER — HYDROMORPHONE HYDROCHLORIDE 1 MG/ML
0.1 INJECTION, SOLUTION INTRAMUSCULAR; INTRAVENOUS; SUBCUTANEOUS
Status: DISCONTINUED | OUTPATIENT
Start: 2023-10-19 | End: 2023-10-19 | Stop reason: HOSPADM

## 2023-10-19 RX ORDER — DAPAGLIFLOZIN 10 MG/1
10 TABLET, FILM COATED ORAL DAILY
Status: DISCONTINUED | OUTPATIENT
Start: 2023-10-19 | End: 2023-10-22 | Stop reason: HOSPADM

## 2023-10-19 RX ORDER — HALOPERIDOL 5 MG/ML
1 INJECTION INTRAMUSCULAR
Status: DISCONTINUED | OUTPATIENT
Start: 2023-10-19 | End: 2023-10-19 | Stop reason: HOSPADM

## 2023-10-19 RX ORDER — LIDOCAINE HYDROCHLORIDE 20 MG/ML
INJECTION, SOLUTION EPIDURAL; INFILTRATION; INTRACAUDAL; PERINEURAL PRN
Status: DISCONTINUED | OUTPATIENT
Start: 2023-10-19 | End: 2023-10-19 | Stop reason: SURG

## 2023-10-19 RX ORDER — EPHEDRINE SULFATE 50 MG/ML
INJECTION, SOLUTION INTRAVENOUS PRN
Status: DISCONTINUED | OUTPATIENT
Start: 2023-10-19 | End: 2023-10-19 | Stop reason: SURG

## 2023-10-19 RX ORDER — EPHEDRINE SULFATE 50 MG/ML
5 INJECTION, SOLUTION INTRAVENOUS
Status: DISCONTINUED | OUTPATIENT
Start: 2023-10-19 | End: 2023-10-19 | Stop reason: HOSPADM

## 2023-10-19 RX ORDER — LABETALOL HYDROCHLORIDE 5 MG/ML
5 INJECTION, SOLUTION INTRAVENOUS
Status: DISCONTINUED | OUTPATIENT
Start: 2023-10-19 | End: 2023-10-19 | Stop reason: HOSPADM

## 2023-10-19 RX ORDER — ONDANSETRON 2 MG/ML
4 INJECTION INTRAMUSCULAR; INTRAVENOUS
Status: DISCONTINUED | OUTPATIENT
Start: 2023-10-19 | End: 2023-10-19 | Stop reason: HOSPADM

## 2023-10-19 RX ORDER — OXYCODONE HCL 5 MG/5 ML
10 SOLUTION, ORAL ORAL
Status: DISCONTINUED | OUTPATIENT
Start: 2023-10-19 | End: 2023-10-19 | Stop reason: HOSPADM

## 2023-10-19 RX ORDER — DEXTROSE MONOHYDRATE 50 MG/ML
INJECTION, SOLUTION INTRAVENOUS CONTINUOUS
Status: DISCONTINUED | OUTPATIENT
Start: 2023-10-19 | End: 2023-10-22 | Stop reason: HOSPADM

## 2023-10-19 RX ORDER — IPRATROPIUM BROMIDE AND ALBUTEROL SULFATE 2.5; .5 MG/3ML; MG/3ML
3 SOLUTION RESPIRATORY (INHALATION)
Status: DISCONTINUED | OUTPATIENT
Start: 2023-10-19 | End: 2023-10-19 | Stop reason: HOSPADM

## 2023-10-19 RX ORDER — SODIUM CHLORIDE, SODIUM LACTATE, POTASSIUM CHLORIDE, CALCIUM CHLORIDE 600; 310; 30; 20 MG/100ML; MG/100ML; MG/100ML; MG/100ML
INJECTION, SOLUTION INTRAVENOUS
Status: DISCONTINUED | OUTPATIENT
Start: 2023-10-19 | End: 2023-10-19 | Stop reason: SURG

## 2023-10-19 RX ORDER — FUROSEMIDE 10 MG/ML
60 INJECTION INTRAMUSCULAR; INTRAVENOUS
Status: DISCONTINUED | OUTPATIENT
Start: 2023-10-19 | End: 2023-10-22

## 2023-10-19 RX ORDER — HYDRALAZINE HYDROCHLORIDE 20 MG/ML
5 INJECTION INTRAMUSCULAR; INTRAVENOUS
Status: DISCONTINUED | OUTPATIENT
Start: 2023-10-19 | End: 2023-10-19 | Stop reason: HOSPADM

## 2023-10-19 RX ORDER — MEPERIDINE HYDROCHLORIDE 25 MG/ML
12.5 INJECTION INTRAMUSCULAR; INTRAVENOUS; SUBCUTANEOUS
Status: DISCONTINUED | OUTPATIENT
Start: 2023-10-19 | End: 2023-10-19 | Stop reason: HOSPADM

## 2023-10-19 RX ORDER — OXYCODONE HCL 5 MG/5 ML
5 SOLUTION, ORAL ORAL
Status: DISCONTINUED | OUTPATIENT
Start: 2023-10-19 | End: 2023-10-19 | Stop reason: HOSPADM

## 2023-10-19 RX ADMIN — DOCUSATE SODIUM 50 MG AND SENNOSIDES 8.6 MG 2 TABLET: 8.6; 5 TABLET, FILM COATED ORAL at 05:14

## 2023-10-19 RX ADMIN — DILTIAZEM HYDROCHLORIDE 30 MG: 30 TABLET, FILM COATED ORAL at 23:34

## 2023-10-19 RX ADMIN — PROPOFOL 30 MG: 10 INJECTION, EMULSION INTRAVENOUS at 11:11

## 2023-10-19 RX ADMIN — PROPOFOL 30 MG: 10 INJECTION, EMULSION INTRAVENOUS at 11:04

## 2023-10-19 RX ADMIN — DAPAGLIFLOZIN 10 MG: 10 TABLET, FILM COATED ORAL at 14:11

## 2023-10-19 RX ADMIN — SPIRONOLACTONE 25 MG: 25 TABLET ORAL at 12:21

## 2023-10-19 RX ADMIN — APIXABAN 5 MG: 5 TABLET, FILM COATED ORAL at 17:33

## 2023-10-19 RX ADMIN — LOSARTAN POTASSIUM 25 MG: 50 TABLET, FILM COATED ORAL at 05:14

## 2023-10-19 RX ADMIN — DILTIAZEM HYDROCHLORIDE 30 MG: 30 TABLET, FILM COATED ORAL at 17:33

## 2023-10-19 RX ADMIN — ROSUVASTATIN CALCIUM 10 MG: 5 TABLET, FILM COATED ORAL at 05:13

## 2023-10-19 RX ADMIN — LIDOCAINE HYDROCHLORIDE 100 MG: 20 INJECTION, SOLUTION EPIDURAL; INFILTRATION; INTRACAUDAL at 10:56

## 2023-10-19 RX ADMIN — FUROSEMIDE 40 MG: 10 INJECTION, SOLUTION INTRAVENOUS at 05:16

## 2023-10-19 RX ADMIN — AMIODARONE HYDROCHLORIDE 1 MG/MIN: 1.8 INJECTION, SOLUTION INTRAVENOUS at 16:21

## 2023-10-19 RX ADMIN — METFORMIN HYDROCHLORIDE 1000 MG: 500 TABLET, EXTENDED RELEASE ORAL at 17:32

## 2023-10-19 RX ADMIN — PROPOFOL 30 MG: 10 INJECTION, EMULSION INTRAVENOUS at 11:03

## 2023-10-19 RX ADMIN — DEXTROSE MONOHYDRATE: 50 INJECTION, SOLUTION INTRAVENOUS at 16:02

## 2023-10-19 RX ADMIN — METOPROLOL SUCCINATE 100 MG: 25 TABLET, EXTENDED RELEASE ORAL at 05:17

## 2023-10-19 RX ADMIN — EPHEDRINE SULFATE 5 MG: 50 INJECTION, SOLUTION INTRAVENOUS at 11:05

## 2023-10-19 RX ADMIN — SODIUM CHLORIDE, POTASSIUM CHLORIDE, SODIUM LACTATE AND CALCIUM CHLORIDE: 600; 310; 30; 20 INJECTION, SOLUTION INTRAVENOUS at 10:51

## 2023-10-19 RX ADMIN — AMIODARONE HYDROCHLORIDE 0.5 MG/MIN: 1.8 INJECTION, SOLUTION INTRAVENOUS at 22:32

## 2023-10-19 RX ADMIN — PROPOFOL 30 MG: 10 INJECTION, EMULSION INTRAVENOUS at 11:02

## 2023-10-19 RX ADMIN — FUROSEMIDE 60 MG: 10 INJECTION, SOLUTION INTRAVENOUS at 16:44

## 2023-10-19 RX ADMIN — EPHEDRINE SULFATE 5 MG: 50 INJECTION, SOLUTION INTRAVENOUS at 11:11

## 2023-10-19 RX ADMIN — MIDAZOLAM HYDROCHLORIDE 2 MG: 2 INJECTION, SOLUTION INTRAMUSCULAR; INTRAVENOUS at 11:00

## 2023-10-19 RX ADMIN — AMIODARONE HYDROCHLORIDE 150 MG: 1.5 INJECTION, SOLUTION INTRAVENOUS at 16:07

## 2023-10-19 RX ADMIN — PROPOFOL 30 MG: 10 INJECTION, EMULSION INTRAVENOUS at 11:07

## 2023-10-19 RX ADMIN — APIXABAN 5 MG: 5 TABLET, FILM COATED ORAL at 05:14

## 2023-10-19 ASSESSMENT — ENCOUNTER SYMPTOMS
COUGH: 0
TROUBLE SWALLOWING: 0
ABDOMINAL DISTENTION: 0
AGITATION: 0
FEVER: 0
PALPITATIONS: 0
BLOOD IN STOOL: 0
CHILLS: 0
CONFUSION: 0
COLOR CHANGE: 0
DIZZINESS: 0
NUMBNESS: 0
ABDOMINAL PAIN: 0
CHEST TIGHTNESS: 0
DIAPHORESIS: 0
SHORTNESS OF BREATH: 1
NERVOUS/ANXIOUS: 0

## 2023-10-19 ASSESSMENT — FIBROSIS 4 INDEX: FIB4 SCORE: 1.06

## 2023-10-19 ASSESSMENT — PAIN SCALES - GENERAL: PAIN_LEVEL: 2

## 2023-10-19 ASSESSMENT — PAIN DESCRIPTION - PAIN TYPE: TYPE: ACUTE PAIN

## 2023-10-19 NOTE — CARE PLAN
"The patient is Watcher - Medium risk of patient condition declining or worsening    Shift Goals  Clinical Goals: NPO midnight/Cardioversion pending, Rest  Patient Goals: Rest  Family Goals: bo    Progress made toward(s) clinical / shift goals:        Problem: Knowledge Deficit - Standard  Goal: Patient and family/care givers will demonstrate understanding of plan of care, disease process/condition, diagnostic tests and medications  10/19/2023 0007 by Keisha Merino R.N.  Outcome: Progressing  Note: Discussed with Pt the importance of remaining NPO after midnight in preparation for his cardioversion on 10/19.  BP at 0012 142/84. Pt asymptomatic visualized  Pt asked how he was feeling, he  held up his hand and stated, \"I am Ok\", then shut his eyes and went back to resting.       Problem: Care Map:  Admission Optimal Outcome for the Heart Failure Patient  Goal: Admission:  Optimal Care of the heart failure patient  10/19/2023 0033 by Keisha Merino R.N.  Outcome: Progressing  Note: Cardioversion scheduled for 10/19. Cardiac Medication regime: Eliquis, Lasix, Cozaar, Metoprolol, Crestor. Tele Monitor in place. Labetalol 10 mg for sbp >180, dbp >110, hold if HR <55.     No report of pain.      "

## 2023-10-19 NOTE — ANESTHESIA TIME REPORT
Anesthesia Start and Stop Event Times     Date Time Event    10/19/2023 1048 Ready for Procedure     1051 Anesthesia Start     1125 Anesthesia Stop        Responsible Staff  10/19/23    Name Role Begin End    Lawrence Richter M.D. Anesth 1051 1125        Overtime Reason:  no overtime (within assigned shift)    Comments:

## 2023-10-19 NOTE — PROGRESS NOTES
Received up in a chair, aaox4, denies any discomfort, NPo since MN, palnned conversion today, up ad simi in room, needs attended.

## 2023-10-19 NOTE — OR NURSING
1123 Pt over from procedure room post FILIBERTO and attempted cardioversion. Pt sleeping. Pt in AFib. VSS.   1202 EKG at bedside  1145 Tolerating orals  1210 Report to Carlos HERNANDEZ  1224 Pt transported to S144 by RN and CCT without incident. Handoff to Carlos HERNANDEZ.

## 2023-10-19 NOTE — CARE PLAN
Problem: Knowledge Deficit - Standard  Goal: Patient and family/care givers will demonstrate understanding of plan of care, disease process/condition, diagnostic tests and medications  Outcome: Progressing  Note: Telemonitor, cardioversion     Problem: Care Map:  Admission Optimal Outcome for the Heart Failure Patient  Goal: Admission:  Optimal Care of the heart failure patient  Outcome: Progressing   The patient is Watcher - Medium risk of patient condition declining or worsening    Shift Goals  Clinical Goals: NPO midnight/Cardioversion pending, Rest  Patient Goals: Rest  Family Goals: bo    Progress made toward(s) clinical / shift goals:  cardioversion plans    Patient is not progressing towards the following goals:

## 2023-10-19 NOTE — PROCEDURES
Electrical Cardioversion    Date/Time: 10/19/2023 11:15 AM    Performed by: Natan Castro M.D.  Authorized by: Natan Castro M.D.    Consent:     Consent obtained:  Verbal and written    Consent given by:  Patient    Risks discussed:  Cutaneous burn, death, induced arrhythmia and pain    Alternatives discussed:  No treatment, rate-control medication, anti-coagulation medication, alternative treatment, observation, delayed treatment and referral  Sedation:     Patient sedated: Yes      Sedation type:  Per anesthesia  Pre-procedure details:     Cardioversion basis:  Elective    Rhythm:  Atrial fibrillation    Electrode placement:  Anterior-posterior    Anticoagulation status:  Apixaban  Attempt one:     Cardioversion mode:  Synchronous    Waveform:  Biphasic    Shock (Joules):  200    Shock outcome:  No change in rhythm (initial sinus, quickly reverted to atrial fibrillation. Failed DCCV x2.)  Post-procedure details:     Patient status:  Awake    Patient tolerance of procedure:  Tolerated well, no immediate complications

## 2023-10-19 NOTE — PROGRESS NOTES
NO HARD CHART:         Patient to procedure Room for FILIBERTO/CV with no hard chart, only loose papers. Per Floor RN, no hard chart available. Procedure RN completed Pre-Procedure Consent paperwork packet, including: WHO Yellow Sheet signed by RN and MD, Informed consent for FILIBERTO/CV procedure signed by RN, patient, and Cardiology, and Informed consent for Anesthesia signed by Anesthesia MD and patient. Procedure Packet secured to loose papers and hand delivered to receiving PACU RN who acknowledged receipt and no hard chart.     English

## 2023-10-19 NOTE — PROGRESS NOTES
Cardiology Follow Up Progress Note    Date of Service  10/19/2023    Attending Physician  Chaparro Archer M.D.    Chief Complaint   Orthopnea and HAGAN     Cardiology consult   Afib and CHF    HPI  Harjit Pantoja is a 58 y.o. male admitted 10/16/2023 with medical history of alcohol and tobacco abuse. Presented with HAGAN and progressive orthopnea for several weeks. Noted to in afib about a week ago as outpatient, started on GDMT.    Presented with orthopnea and HAGAN, noted to be in afib with RVR. ECHO showed reduced ef 30%     Interim Events  Afib 110-150  Complains of HAGAN   NPO for FILIBERTO/ CV today     Review of Systems  Review of Systems   Constitutional:  Negative for chills, diaphoresis and fever.   HENT:  Negative for nosebleeds and trouble swallowing.    Respiratory:  Positive for shortness of breath. Negative for cough and chest tightness.    Cardiovascular:  Negative for chest pain, palpitations and leg swelling.   Gastrointestinal:  Negative for abdominal distention, abdominal pain and blood in stool.   Genitourinary:  Negative for hematuria.   Skin:  Negative for color change.   Neurological:  Negative for dizziness, syncope and numbness.   Psychiatric/Behavioral:  Negative for agitation and confusion. The patient is not nervous/anxious.        Vital signs in last 24 hours  Temp:  [36 °C (96.8 °F)-36.7 °C (98.1 °F)] 36.7 °C (98.1 °F)  Pulse:  [] 62  Resp:  [17-18] 18  BP: (121-142)/(79-95) 132/79  SpO2:  [91 %-95 %] 92 %    Physical Exam  Physical Exam  Vitals and nursing note reviewed.   Constitutional:       Appearance: Normal appearance.   HENT:      Head: Normocephalic and atraumatic.   Eyes:      Pupils: Pupils are equal, round, and reactive to light.   Cardiovascular:      Rate and Rhythm: Tachycardia present. Rhythm irregular.      Heart sounds: Normal heart sounds. No murmur heard.  Pulmonary:      Effort: Pulmonary effort is normal.      Breath sounds: Normal breath sounds.   Abdominal:       General: Abdomen is flat.   Musculoskeletal:      Cervical back: Normal range of motion.      Right lower le+ Pitting Edema present.      Left lower le+ Pitting Edema present.   Skin:     General: Skin is warm and dry.   Neurological:      General: No focal deficit present.      Mental Status: He is alert and oriented to person, place, and time.   Psychiatric:         Mood and Affect: Mood normal.         Behavior: Behavior normal.         Thought Content: Thought content normal.         Judgment: Judgment normal.         Lab Review  Lab Results   Component Value Date/Time    WBC 7.2 10/18/2023 12:48 AM    RBC 4.37 (L) 10/18/2023 12:48 AM    HEMOGLOBIN 13.6 (L) 10/18/2023 12:48 AM    HEMATOCRIT 41.7 (L) 10/18/2023 12:48 AM    MCV 95.4 10/18/2023 12:48 AM    MCH 31.1 10/18/2023 12:48 AM    MCHC 32.6 10/18/2023 12:48 AM    MPV 11.0 10/18/2023 12:48 AM      Lab Results   Component Value Date/Time    SODIUM 142 10/19/2023 01:00 AM    POTASSIUM 4.1 10/19/2023 01:00 AM    CHLORIDE 105 10/19/2023 01:00 AM    CO2 27 10/19/2023 01:00 AM    GLUCOSE 117 (H) 10/19/2023 01:00 AM    BUN 14 10/19/2023 01:00 AM    CREATININE 0.95 10/19/2023 01:00 AM    CREATININE 1.0 2005 05:15 AM      Lab Results   Component Value Date/Time    ASTSGOT 20 10/19/2023 01:00 AM    ALTSGPT 29 10/19/2023 01:00 AM     Lab Results   Component Value Date/Time    CHOLSTRLTOT 116 2022 10:54 AM    LDL 35 2022 10:54 AM    HDL 69 2022 10:54 AM    TRIGLYCERIDE 58 2022 10:54 AM    TROPONINT 24 (H) 10/16/2023 06:55 PM       Recent Labs     10/16/23  1625   NTPROBNP 6003*       Cardiac Imaging and Procedures Review    Echocardiogram:    10/17/2023  Severely reduced left ventricular systolic function.  The left ventricular ejection fraction is visually estimated to be 30%.  There is evidence of elevated left ventricular filling pressures in the   setting of dysrhythmia.  Global hypokinesis most pronounced in the LV  apex.  Mildly dilated right ventricle.  Moderately reduced right ventricular systolic function.  Enlarged right atrium.  Mildly dilated left atrium.  Normal estimated right atrial pressure.   Mild pulmonary hypertension, estimated PASP 40mmHg.   Small pericardial effusion without evidence of hemodynamic compromise.    Assessment/Plan  No new Assessment & Plan notes have been filed under this hospital service since the last note was generated.  Service: Cardiology    Acute decompensated heart failure ef 30%  Stage C, class 3  Atrial fibrillation new   -   Continue IV diuresis until euvolemic. Strict I and O. Daily stand up weight   -  Continue losartan 25 mg daily and Toprol XL, uptitrate as tolerated for rate control  - added farxiga and aldactone 25mg qd   -  Continue oral anticoagulation with Eliquis 5mg BID   - plan for FILIBERTO/ CV today. NPO since midnight     2. Hyperlipidemia   -  Continue statin therapy    3. Tobacco and alcohol abuse   - Abstinence     I personally spent a total of 15 minutes which includes face-to-face time and non-face-to-face time spent on preparing to see the patient, reviewing hospital notes and tests, obtaining history from the patient, performing a medically appropriate exam, counseling and educating the patient, ordering medications/tests/procedures/referrals as clinically indicated, and documenting information in the electronic medical record.      The risks, benefits, and alternatives to transesophageal echocardiogram with IV sedation were discussed with the patient in specific detail, including oropharyngeal and esophageal traumas including hoarseness and dysphagia after the procedure. Rare cases demonstrating serious or fatal complications associated with transesophageal echocardiogram have been reported in the adult population, including cardiac, pulmonary and bleeding complications in less than 1% of people. Patients with an identified intracardiac thrombus are at increased risk  for embolic events and this appears to be reduced with anticoagulant therapy. The patient verbalized understandings about these  possible complications and wishes to proceed with this procedure     The risks, benefits, and alternatives to electrical cardioversion were discussed in great detail. We discussed that conversion of atrial fibrillation to normal rhythm, at least transiently, is successful in 90 to 95% of patients. However, maintaining a normal rhythm depends on a number of factors, including underlying heart disease and antiarrhythmic medications. Atrial fibrillation often recurs with time and other treatments may be necessary. Risks of  cardioversion are low as long as anticoagulation issues are handled appropriately. There is a small (less than 1%) risk of embolic events, including stroke. Risks of electrical shock include mild muscle soreness and mild skin burning at the site of electrode placement. There is also a risk that cardioversion can stimulate more dangerous arrhythmias. The patient verbalized understanding of these potential complications and wishes to proceed with this procedure.       Thank you for allowing me to participate in the care of this patient.  I will continue to follow this patient    Please contact me with any questions.    EVELYN Herrmann.

## 2023-10-19 NOTE — ANESTHESIA POSTPROCEDURE EVALUATION
Patient: Harjit Pantoja    Procedure Summary     Date: 10/19/23 Room / Location: [facility] - Echocardiology [facility]    Anesthesia Start: 1051 Anesthesia Stop: 1125    Procedures:       EC-FILIBERTO W/O CONT      CL-CARDIOVERSION Diagnosis:       Hypervolemia      Hypervolemia      Hypervolemia      (See Assoicated Dx)    Scheduled Providers: Natan Castro M.D. Responsible Provider: Lawrence Richter M.D.    Anesthesia Type: MAC ASA Status: 4          Final Anesthesia Type: MAC  Last vitals  BP   Blood Pressure: 114/56    Temp   36.7 °C (98.1 °F)    Pulse   (!) 120   Resp   18    SpO2   97 %      Anesthesia Post Evaluation    Patient location during evaluation: PACU  Patient participation: complete - patient participated  Level of consciousness: awake  Pain score: 2    Airway patency: patent  Anesthetic complications: no  Cardiovascular status: hemodynamically stable and tachycardic  Respiratory status: acceptable  Hydration status: acceptable    PONV: none          No notable events documented.     Nurse Pain Score: 0 (NPRS)

## 2023-10-19 NOTE — PROGRESS NOTES
Rate 103-146  Rhythm A Fib, >160 RVR, PVC  HX Coup  0/0.12/0.29   As per Tele Monitor 2 hr sheet

## 2023-10-19 NOTE — ANESTHESIA PREPROCEDURE EVALUATION
Date/Time: 10/19/23 1100    Scheduled providers: Natan Castro M.D.    Procedure: CL-CARDIOVERSION    Diagnosis:       Hypervolemia [E87.70]      Hypervolemia [E87.70]    Indications: See Assoicated Dx    [facility]        57 yo M w/ T2DM, afib, HFrEF (LVEF 30%)    ECHO 10/17/23  LVEF 30%, global hypokinesis, mildly dilated RV    Relevant Problems   CARDIAC   (positive) A-fib (HCC)      ENDO   (positive) Type 2 diabetes mellitus (HCC)       Physical Exam    Airway   Mallampati: II  TM distance: >3 FB  Neck ROM: full       Cardiovascular - normal exam  Rhythm: irregular  Rate: normal  (-) murmur     Dental - normal exam           Pulmonary - normal exam  Breath sounds clear to auscultation     Abdominal    Neurological - normal exam               Anesthesia Plan    ASA 4   ASA physical status 4 criteria: uncorrected/decompensated heart disease and severe reduction of ejection fractions    Plan - MAC               Induction: intravenous    Postoperative Plan: Postoperative administration of opioids is intended.    Pertinent diagnostic labs and testing reviewed    Informed Consent:    Anesthetic plan and risks discussed with patient.    Use of blood products discussed with: patient whom consented to blood products.

## 2023-10-19 NOTE — PROGRESS NOTES
[facility] [place] FAMILY MEDICINE PROGRESS NOTE     Attending: Chaparro Archer M.d.    Senior Resident: Alex Castro Md      PATIENT: Harjit Pantoja; [id number]; 1965    ID: 58 y.o. male admitted for worsening edema in the setting of recently diagnosed A. Fib     SUBJECTIVE: No acute events overnight. Pt off o2, feeling much better, reports decreased swelling, breathing improved, no palpitation or cp. Npo at mn, awaiting chery/cv today.    OBJECTIVE:     Vitals:    10/19/23 1130 10/19/23 1145 10/19/23 1200 10/19/23 1215   BP: 106/53 (!) 140/63 124/78 126/85   Pulse: 65 74 71 84   Resp: (!) 22 20 18 18   Temp:    36.6 °C (97.8 °F)   TempSrc:    Temporal   SpO2: 97% 96% 94% 97%   Weight:       Height:           Intake/Output Summary (Last 24 hours) at 10/17/2023 1201  Last data filed at 10/17/2023 0600  Gross per 24 hour   Intake 400 ml   Output 1350 ml   Net -950 ml       PE:   General: No acute distress, resting comfortably in bed.  HEENT: NC/AT. PERRLA. EOMI. MMM  Cardiovascular: Normal S1/S2, regularly irregular, no m/r/g  Respiratory: Symmetric inspiratory effort. CTAB with no adventitious sounds  Abdomen: BS+, soft, NT/ND   EXT:  BAIG, 5/5 strength, 2+ pulses, no rashes, bruising, or bleeding. 1 to 2+pitting edema bilateral legs to below knee improved since yesterday  Neuro: Non focal with no numbness, tingling or changes in sensation    LABS:  Recent Labs     10/16/23  1625 10/17/23  0041 10/18/23  0048   WBC 8.5 7.9 7.2   RBC 4.68* 4.42* 4.37*   HEMOGLOBIN 14.4 13.8* 13.6*   HEMATOCRIT 44.6 42.0 41.7*   MCV 95.3 95.0 95.4   MCH 30.8 31.2 31.1   RDW 44.8 44.3 43.9   PLATELETCT 233 211 203   MPV 10.5 11.0 11.0   NEUTSPOLYS 65.50 66.70 59.80   LYMPHOCYTES 26.20 24.10 32.00   MONOCYTES 6.80 7.60 6.50   EOSINOPHILS 0.60 0.80 1.00   BASOPHILS 0.50 0.50 0.40     Recent Labs     10/17/23  0041 10/18/23  0048 10/19/23  0100   SODIUM 141 142 142   POTASSIUM 4.6 4.0 4.1   CHLORIDE 107 104 105   CO2 26 27 27   BUN 18 18 14  "  CREATININE 1.06 1.18 0.95   CALCIUM 8.8 9.1 8.8   MAGNESIUM 1.9  --   --    ALBUMIN 3.7 4.1 3.9     Estimated GFR/CRCL = Estimated Creatinine Clearance: 112.5 mL/min (by C-G formula based on SCr of 0.95 mg/dL).  Recent Labs     10/17/23  0041 10/18/23  0048 10/19/23  0100   GLUCOSE 133* 114* 117*     Recent Labs     10/17/23  0041 10/18/23  0048 10/19/23  0100   ASTSGOT 21 25 20   ALTSGPT 39 35 29   TBILIRUBIN 0.6 0.6 0.6   ALKPHOSPHAT 48 56 55   GLOBULIN 2.1 2.1 2.0             No results for input(s): \"INR\", \"APTT\", \"FIBRINOGEN\" in the last 72 hours.    Invalid input(s): \"DIMER\"    MICROBIOLOGY:   Results       ** No results found for the last 168 hours. **              IMAGING:   EC-FILIBERTO W/O CONT         EC-ECHOCARDIOGRAM COMPLETE W/ CONT   Final Result      DX-CHEST-PORTABLE (1 VIEW)   Final Result      1.  Bilateral atelectasis and enlargement the cardiac silhouette      2.  Probable small bilateral pleural effusion      CL-CARDIOVERSION    (Results Pending)       MEDS:  Current Facility-Administered Medications   Medication Last Admin    [START ON 10/20/2023] losartan (Cozaar) tablet 50 mg      losartan (Cozaar) tablet 25 mg      spironolactone (Aldactone) tablet 25 mg 25 mg at 10/19/23 1221    dapagliflozin propanediol (Farxiga) tablet 10 mg      furosemide (Lasix) injection 40 mg 40 mg at 10/19/23 0516    metoprolol SR (Toprol XL) tablet 100 mg 100 mg at 10/19/23 0517    Pneumococcal 20-Arianna Conj Vacc (Prevnar 20) syringe 0.5 mL      senna-docusate (Pericolace Or Senokot S) 8.6-50 MG per tablet 2 Tablet 2 Tablet at 10/19/23 0514    And    polyethylene glycol/lytes (Miralax) PACKET 1 Packet      And    magnesium hydroxide (Milk Of Magnesia) suspension 30 mL      And    bisacodyl (Dulcolax) suppository 10 mg      acetaminophen (Tylenol) tablet 650 mg      labetalol (Normodyne/Trandate) injection 10 mg      apixaban (Eliquis) tablet 5 mg 5 mg at 10/19/23 0514    metFORMIN ER (Glucophage XR) tablet 1,000 mg " 1,000 mg at 10/18/23 1751    rosuvastatin (Crestor) tablet 10 mg 10 mg at 10/19/23 0513       ASSESSMENT/PLAN: Harjit Pantoja is a 58 y.o. male admitted for worsening edema in the setting of recently diagnosed atrial fibrillation.    Pt net -1.6L today, cards to take pt for CHERY and likely cardioversion today; pt improving.    * Hypervolemia- (present on admission)  Assessment & Plan  #CHF new dx   #hypoxia (resolved)    Patient presents the ER with fluid overload, including orthopnea and bilateral lower extremity pitting edema with 10 pounds of weight gain over the last several days.  He also reports dyspnea on exertion.  He breathes well at rest.  Patient does have history of heavy alcohol use, quitting 3 weeks ago.  NT-proBNP was elevated at 6000.  He has never seen a cardiologist.  Symptoms are highly suggestive of heart failure exacerbation.  Patient is at risk for liver disease based on alcohol use history, however transaminases, alk phos, bilirubin all normal on labs, and physical exam suggestive more of heart failure than liver failure. TSH unremarkable    -Echo completed 10/17, shows EF 30% and global hypokenisis  -appreciate card's reccs below:     =chery w/ possible cardioversion today    =Continue IV diuresis until euvolemic    =Continue losartan 25 mg daily    =Continue Toprol XL, uptitrate as tolerated for rate control    =Continue oral anticoagulation with Eliquis    =Continue statin therapy    =continue SGLT2 inhibitor    =continue spironolactone    =Abstinence from tobacco and alcohol  - Strict I's and O's  - Continue home statin  -continue vs monitoring      Type 2 diabetes mellitus (HCC)  Assessment & Plan  History of diabetes.  Last A1c noted to be 5.9 in December 2022. Treated with metformin.   A1c 6.4 this admission    -Continue home metformin  -educate on lifestyle changes I.e. diet and exercise    A-fib (HCC)  Assessment & Plan  Patient has recently diagnosed A-fib.  CHA2DS2-VASc=3.  He was  started on Eliquis outpatient.  Patient's heart rate has been rate controlled however when in room with patient and he laughs or becomes active heart rate does increase to the low 100s a few times into the 110s.    -FILIBERTO and likely cardioversion today per cards  - Continue Eliquis  - appreciate cards reccs  - cont Metoprolol, up titrate prn  - Telemetry      Has recently quit alcohol use  Assessment & Plan  Patient quit drinking 3 weeks ago.  He previously had 1 year of sobriety.  He is currently using marijuana Gummies.  He has not gone to groups although he has tried a few AA meetings, but is not interested in going back.  Labs are not suggestive of liver failure (PLT, albumin, bili, liver enzymes all WNL).  Patient was with wife today who reports that he is highly motivated to not have any alcoholic beverages.          Core Measures:  Fluids: None  Lines: PIV  Abx: None  Diet: Diabetic with 2 g salt restriction and 2 L fluid restriction  PPX: SCDs and Eliquis  DISPO: Patient to remain inpatient for further medical management      CODE STATUS: Full

## 2023-10-20 LAB
ALBUMIN SERPL BCP-MCNC: 3.8 G/DL (ref 3.2–4.9)
ALBUMIN/GLOB SERPL: 1.6 G/DL
ALP SERPL-CCNC: 52 U/L (ref 30–99)
ALT SERPL-CCNC: 27 U/L (ref 2–50)
ANION GAP SERPL CALC-SCNC: 9 MMOL/L (ref 7–16)
AST SERPL-CCNC: 19 U/L (ref 12–45)
BILIRUB SERPL-MCNC: 0.4 MG/DL (ref 0.1–1.5)
BUN SERPL-MCNC: 17 MG/DL (ref 8–22)
CALCIUM ALBUM COR SERPL-MCNC: 9.2 MG/DL (ref 8.5–10.5)
CALCIUM SERPL-MCNC: 9 MG/DL (ref 8.5–10.5)
CHLORIDE SERPL-SCNC: 103 MMOL/L (ref 96–112)
CO2 SERPL-SCNC: 30 MMOL/L (ref 20–33)
CREAT SERPL-MCNC: 1.4 MG/DL (ref 0.5–1.4)
ERYTHROCYTE [DISTWIDTH] IN BLOOD BY AUTOMATED COUNT: 43.2 FL (ref 35.9–50)
GFR SERPLBLD CREATININE-BSD FMLA CKD-EPI: 58 ML/MIN/1.73 M 2
GLOBULIN SER CALC-MCNC: 2.4 G/DL (ref 1.9–3.5)
GLUCOSE SERPL-MCNC: 125 MG/DL (ref 65–99)
HCT VFR BLD AUTO: 41.6 % (ref 42–52)
HGB BLD-MCNC: 13.6 G/DL (ref 14–18)
MAGNESIUM SERPL-MCNC: 2.1 MG/DL (ref 1.5–2.5)
MCH RBC QN AUTO: 30.9 PG (ref 27–33)
MCHC RBC AUTO-ENTMCNC: 32.7 G/DL (ref 32.3–36.5)
MCV RBC AUTO: 94.5 FL (ref 81.4–97.8)
PHOSPHATE SERPL-MCNC: 4.6 MG/DL (ref 2.5–4.5)
PLATELET # BLD AUTO: 203 K/UL (ref 164–446)
PMV BLD AUTO: 11 FL (ref 9–12.9)
POTASSIUM SERPL-SCNC: 4.2 MMOL/L (ref 3.6–5.5)
PROT SERPL-MCNC: 6.2 G/DL (ref 6–8.2)
RBC # BLD AUTO: 4.4 M/UL (ref 4.7–6.1)
SODIUM SERPL-SCNC: 142 MMOL/L (ref 135–145)
WBC # BLD AUTO: 7 K/UL (ref 4.8–10.8)

## 2023-10-20 PROCEDURE — 700102 HCHG RX REV CODE 250 W/ 637 OVERRIDE(OP): Performed by: NURSE PRACTITIONER

## 2023-10-20 PROCEDURE — 83735 ASSAY OF MAGNESIUM: CPT

## 2023-10-20 PROCEDURE — 700102 HCHG RX REV CODE 250 W/ 637 OVERRIDE(OP): Performed by: INTERNAL MEDICINE

## 2023-10-20 PROCEDURE — A9270 NON-COVERED ITEM OR SERVICE: HCPCS | Performed by: INTERNAL MEDICINE

## 2023-10-20 PROCEDURE — 36415 COLL VENOUS BLD VENIPUNCTURE: CPT

## 2023-10-20 PROCEDURE — 770020 HCHG ROOM/CARE - TELE (206)

## 2023-10-20 PROCEDURE — 700111 HCHG RX REV CODE 636 W/ 250 OVERRIDE (IP): Mod: JZ | Performed by: INTERNAL MEDICINE

## 2023-10-20 PROCEDURE — 85027 COMPLETE CBC AUTOMATED: CPT

## 2023-10-20 PROCEDURE — 84100 ASSAY OF PHOSPHORUS: CPT

## 2023-10-20 PROCEDURE — 700102 HCHG RX REV CODE 250 W/ 637 OVERRIDE(OP)

## 2023-10-20 PROCEDURE — 99232 SBSQ HOSP IP/OBS MODERATE 35: CPT | Performed by: INTERNAL MEDICINE

## 2023-10-20 PROCEDURE — A9270 NON-COVERED ITEM OR SERVICE: HCPCS | Performed by: STUDENT IN AN ORGANIZED HEALTH CARE EDUCATION/TRAINING PROGRAM

## 2023-10-20 PROCEDURE — A9270 NON-COVERED ITEM OR SERVICE: HCPCS

## 2023-10-20 PROCEDURE — A9270 NON-COVERED ITEM OR SERVICE: HCPCS | Performed by: NURSE PRACTITIONER

## 2023-10-20 PROCEDURE — 99232 SBSQ HOSP IP/OBS MODERATE 35: CPT | Mod: GC | Performed by: FAMILY MEDICINE

## 2023-10-20 PROCEDURE — 80053 COMPREHEN METABOLIC PANEL: CPT

## 2023-10-20 PROCEDURE — 700102 HCHG RX REV CODE 250 W/ 637 OVERRIDE(OP): Performed by: STUDENT IN AN ORGANIZED HEALTH CARE EDUCATION/TRAINING PROGRAM

## 2023-10-20 RX ORDER — DILTIAZEM HYDROCHLORIDE 60 MG/1
60 TABLET, FILM COATED ORAL EVERY 6 HOURS
Status: DISCONTINUED | OUTPATIENT
Start: 2023-10-20 | End: 2023-10-22 | Stop reason: HOSPADM

## 2023-10-20 RX ADMIN — LOSARTAN POTASSIUM 50 MG: 50 TABLET, FILM COATED ORAL at 06:18

## 2023-10-20 RX ADMIN — DAPAGLIFLOZIN 10 MG: 10 TABLET, FILM COATED ORAL at 06:18

## 2023-10-20 RX ADMIN — FUROSEMIDE 60 MG: 10 INJECTION, SOLUTION INTRAVENOUS at 06:19

## 2023-10-20 RX ADMIN — ROSUVASTATIN CALCIUM 10 MG: 5 TABLET, FILM COATED ORAL at 06:19

## 2023-10-20 RX ADMIN — METFORMIN HYDROCHLORIDE 1000 MG: 500 TABLET, EXTENDED RELEASE ORAL at 18:06

## 2023-10-20 RX ADMIN — AMIODARONE HYDROCHLORIDE 0.5 MG/MIN: 1.8 INJECTION, SOLUTION INTRAVENOUS at 09:23

## 2023-10-20 RX ADMIN — AMIODARONE HYDROCHLORIDE 0.5 MG/MIN: 1.8 INJECTION, SOLUTION INTRAVENOUS at 22:12

## 2023-10-20 RX ADMIN — DILTIAZEM HYDROCHLORIDE 30 MG: 30 TABLET, FILM COATED ORAL at 06:19

## 2023-10-20 RX ADMIN — APIXABAN 5 MG: 5 TABLET, FILM COATED ORAL at 06:17

## 2023-10-20 RX ADMIN — DILTIAZEM HYDROCHLORIDE 60 MG: 60 TABLET ORAL at 18:07

## 2023-10-20 RX ADMIN — METOPROLOL SUCCINATE 100 MG: 25 TABLET, EXTENDED RELEASE ORAL at 06:18

## 2023-10-20 RX ADMIN — SPIRONOLACTONE 25 MG: 25 TABLET ORAL at 06:17

## 2023-10-20 RX ADMIN — DILTIAZEM HYDROCHLORIDE 30 MG: 30 TABLET, FILM COATED ORAL at 13:24

## 2023-10-20 RX ADMIN — METFORMIN HYDROCHLORIDE 1000 MG: 500 TABLET, EXTENDED RELEASE ORAL at 06:19

## 2023-10-20 RX ADMIN — SILVER SULFADIAZINE 1 G: 10 CREAM TOPICAL at 06:16

## 2023-10-20 RX ADMIN — APIXABAN 5 MG: 5 TABLET, FILM COATED ORAL at 18:06

## 2023-10-20 ASSESSMENT — ENCOUNTER SYMPTOMS
TROUBLE SWALLOWING: 0
CHILLS: 0
PALPITATIONS: 0
SHORTNESS OF BREATH: 1
AGITATION: 0
NERVOUS/ANXIOUS: 0
CHEST TIGHTNESS: 0
DIZZINESS: 0
ABDOMINAL PAIN: 0
COUGH: 0
BLOOD IN STOOL: 0
CONFUSION: 0
DIAPHORESIS: 0
NUMBNESS: 0
COLOR CHANGE: 0
ABDOMINAL DISTENTION: 0
FEVER: 0

## 2023-10-20 ASSESSMENT — COGNITIVE AND FUNCTIONAL STATUS - GENERAL
SUGGESTED CMS G CODE MODIFIER MOBILITY: CH
MOBILITY SCORE: 24
SUGGESTED CMS G CODE MODIFIER DAILY ACTIVITY: CH
DAILY ACTIVITIY SCORE: 24

## 2023-10-20 ASSESSMENT — CHA2DS2 SCORE
CHF OR LEFT VENTRICULAR DYSFUNCTION: YES
AGE 65 TO 74: NO
VASCULAR DISEASE: NO
SEX: MALE
AGE 75 OR GREATER: NO
PRIOR STROKE OR TIA OR THROMBOEMBOLISM: NO
CHA2DS2 VASC SCORE: 3
HYPERTENSION: YES
DIABETES: YES

## 2023-10-20 ASSESSMENT — FIBROSIS 4 INDEX: FIB4 SCORE: 1.04

## 2023-10-20 NOTE — PROGRESS NOTES
[facility] [place] [facility]ILY MEDICINE PROGRESS NOTE     Attending: Chaparro Archer M.d.    Senior Resident: Alex Castro Md  Talat Resident: Shaina Bustos Md      PATIENT: Harjit Pantoja; [id number]; 1965    ID: 58 y.o. male admitted for worsening edema in the setting of recently diagnosed A. Fib     SUBJECTIVE: Patient remained in a-fib overnight. Yesterday patient failed x2 DCCV. Per cardiology he was continued on amiodarone drip, with plan to reattempt cardioversion on 10/21/23. Patient this morning states that he feels improvement. He denies shortness of breath, chest pain, palpitations, and worsening edema.     OBJECTIVE:     Vitals:    10/20/23 0448 10/20/23 0700 10/20/23 1200 10/20/23 1324   BP: 135/89 (!) 117/92 106/67 112/78   Pulse: (!) 109 100 91 99   Resp: 14 16 17    Temp: 36.3 °C (97.3 °F) 36.6 °C (97.8 °F) 36.3 °C (97.4 °F)    TempSrc: Temporal Temporal Temporal    SpO2: 92% 96% 97%    Weight: 111 kg (244 lb 7.8 oz)      Height:             Intake/Output Summary (Last 24 hours) at 10/20/2023 1345  Last data filed at 10/20/2023 1000  Gross per 24 hour   Intake 450 ml   Output 2575 ml   Net -2125 ml       PE:   General: No acute distress, resting comfortably in bed.  HEENT: NC/AT. PERRLA. EOMI. MMM  Cardiovascular: Normal S1/S2,  irregular rhythm, no m/r/g  Respiratory: Symmetric inspiratory effort. CTAB with no adventitious sounds  Abdomen: BS+, soft, NT/ND   EXT:  BAIG, 5/5 strength, 2+ pulses, no rashes, bruising, or bleeding. 1+ pitting edema to mid calf   Neuro: Non focal with no numbness, tingling or changes in sensation    LABS:  Recent Labs     10/18/23  0048 10/20/23  0031   WBC 7.2 7.0   RBC 4.37* 4.40*   HEMOGLOBIN 13.6* 13.6*   HEMATOCRIT 41.7* 41.6*   MCV 95.4 94.5   MCH 31.1 30.9   RDW 43.9 43.2   PLATELETCT 203 203   MPV 11.0 11.0   NEUTSPOLYS 59.80  --    LYMPHOCYTES 32.00  --    MONOCYTES 6.50  --    EOSINOPHILS 1.00  --    BASOPHILS 0.40  --      Recent Labs     10/18/23  0048 10/19/23  0100  "10/20/23  0031   SODIUM 142 142 142   POTASSIUM 4.0 4.1 4.2   CHLORIDE 104 105 103   CO2 27 27 30   BUN 18 14 17   CREATININE 1.18 0.95 1.40   CALCIUM 9.1 8.8 9.0   MAGNESIUM  --   --  2.1   PHOSPHORUS  --   --  4.6*   ALBUMIN 4.1 3.9 3.8     Estimated GFR/CRCL = Estimated Creatinine Clearance: 74 mL/min (by C-G formula based on SCr of 1.4 mg/dL).  Recent Labs     10/18/23  0048 10/19/23  0100 10/20/23  0031   GLUCOSE 114* 117* 125*     Recent Labs     10/18/23  0048 10/19/23  0100 10/20/23  0031   ASTSGOT 25 20 19   ALTSGPT 35 29 27   TBILIRUBIN 0.6 0.6 0.4   ALKPHOSPHAT 56 55 52   GLOBULIN 2.1 2.0 2.4             No results for input(s): \"INR\", \"APTT\", \"FIBRINOGEN\" in the last 72 hours.    Invalid input(s): \"DIMER\"    MICROBIOLOGY:   Results       ** No results found for the last 168 hours. **              IMAGING:   EC-FILIBERTO W/O CONT         EC-ECHOCARDIOGRAM COMPLETE W/ CONT   Final Result      DX-CHEST-PORTABLE (1 VIEW)   Final Result      1.  Bilateral atelectasis and enlargement the cardiac silhouette      2.  Probable small bilateral pleural effusion      CL-CARDIOVERSION    (Results Pending)       MEDS:  Current Facility-Administered Medications   Medication Last Admin    silver sulfADIAZINE (Silvadene) 1 % cream 1 g at 10/20/23 0616    losartan (Cozaar) tablet 50 mg 50 mg at 10/20/23 0618    spironolactone (Aldactone) tablet 25 mg 25 mg at 10/20/23 0617    dapagliflozin propanediol (Farxiga) tablet 10 mg 10 mg at 10/20/23 0618    [Held by provider] furosemide (Lasix) injection 60 mg 60 mg at 10/20/23 0619    dextrose 5% infusion New Bag at 10/19/23 1602    amiodarone (Nexterone) 360 mg/200 mL infusion 0.5 mg/min at 10/20/23 0923    dilTIAZem (Cardizem) tablet 30 mg 30 mg at 10/20/23 1324    metoprolol SR (Toprol XL) tablet 100 mg 100 mg at 10/20/23 0618    senna-docusate (Pericolace Or Senokot S) 8.6-50 MG per tablet 2 Tablet 2 Tablet at 10/19/23 0514    And    polyethylene glycol/lytes (Miralax) PACKET 1 " Packet      And    magnesium hydroxide (Milk Of Magnesia) suspension 30 mL      And    bisacodyl (Dulcolax) suppository 10 mg      acetaminophen (Tylenol) tablet 650 mg      labetalol (Normodyne/Trandate) injection 10 mg      apixaban (Eliquis) tablet 5 mg 5 mg at 10/20/23 0617    metFORMIN ER (Glucophage XR) tablet 1,000 mg 1,000 mg at 10/20/23 0619    rosuvastatin (Crestor) tablet 10 mg 10 mg at 10/20/23 0619       ASSESSMENT/PLAN: Harjit Pantoja is a 58 y.o. male admitted for worsening edema in the setting of recently diagnosed atrial fibrillation.      * Hypervolemia- (present on admission)  Assessment & Plan  #CHF new dx with EF of 30%  #hypoxia (resolved)    Patient presents the ER with fluid overload, including orthopnea and bilateral lower extremity pitting edema with 10 pounds of weight gain over the last several days.  He also reports dyspnea on exertion.  He breathes well at rest.  Patient does have history of heavy alcohol use, quitting 3 weeks ago.  NT-proBNP was elevated at 6000.  He has never seen a cardiologist.  Symptoms are highly suggestive of heart failure exacerbation.  Patient is at risk for liver disease based on alcohol use history, however transaminases, alk phos, bilirubin all normal on labs, and physical exam suggestive more of heart failure than liver failure. TSH unremarkable    -Echo completed 10/17, shows EF 30% and global hypokenisis  -appreciate card's reccs below:    -   Continue IV diuresis until euvolemic. Strict I and O. Daily stand up weight, will hold lasix 60 mg IV evening dose on 10/20 due to  creatinine of 1.4   - Metoprolol 100 mg, uptitrate as tolerated for rate control    - Losartan 50 mg daily    - Farxiga    - Aldactone    - Cardizem 30 mg q 6hrs    - Eliquis oral anticoagulation     - Amiodarone continued for cardioversion planned 10/21/23  - Abstinence from tobacco and alcohol  - Strict I's and O's  - Continue home statin  -continue vs monitoring      Type 2  diabetes mellitus (HCC)  Assessment & Plan  History of diabetes.  Last A1c noted to be 5.9 in December 2022. Treated with metformin.   A1c 6.4 this admission    -Continue home metformin  -educate on lifestyle changes I.e. diet and exercise    A-fib (HCC)  Assessment & Plan  Patient has recently diagnosed A-fib.  CHA2DS2-VASc=3.  He was started on Eliquis outpatient.  Patient's heart rate has been rate controlled however when in room with patient and he laughs or becomes active heart rate does increase to the low 100s a few times into the 110s.    -Amiodarone drip with plan for repeat cardioversion 10/21/23  - Continue Eliquis  - appreciate cards reccs  - cont Metoprolol, up titrate prn  - Telemetry      Has recently quit alcohol use  Assessment & Plan  Patient quit drinking 3 weeks ago.  He previously had 1 year of sobriety.  He is currently using marijuana Gummies.  He has not gone to groups although he has tried a few AA meetings, but is not interested in going back.  Labs are not suggestive of liver failure (PLT, albumin, bili, liver enzymes all WNL).  Patient was with wife today who reports that he is highly motivated to not have any alcoholic beverages.          Core Measures:  Fluids: None  Lines: PIV  Abx: None  Diet: Diabetic with 2 g salt restriction and 2 L fluid restriction  PPX: SCDs and Eliquis  DISPO: Patient to remain inpatient for further medical management      CODE STATUS: Full

## 2023-10-20 NOTE — PROGRESS NOTES
Cardiology Follow Up Progress Note    Date of Service  10/20/2023    Attending Physician  Chaparro Archer M.D.    Chief Complaint   Orthopnea and HAGAN     Cardiology consult   Afib and CHF    HPI  Harjit Pantoja is a 58 y.o. male admitted 10/16/2023 with medical history of alcohol and tobacco abuse. Presented with HAGAN and progressive orthopnea for several weeks. Noted to in afib about a week ago as outpatient, started on GDMT.    Presented with orthopnea and HAGAN, noted to be in afib with RVR. ECHO showed reduced ef 30%     Interim Events  Afib 110-130  Failed cardioversion 10/19  Complains of HAGAN improving     Review of Systems  Review of Systems   Constitutional:  Negative for chills, diaphoresis and fever.   HENT:  Negative for nosebleeds and trouble swallowing.    Respiratory:  Positive for shortness of breath. Negative for cough and chest tightness.    Cardiovascular:  Positive for leg swelling. Negative for chest pain and palpitations.   Gastrointestinal:  Negative for abdominal distention, abdominal pain and blood in stool.   Genitourinary:  Negative for hematuria.   Skin:  Negative for color change.   Neurological:  Negative for dizziness, syncope and numbness.   Psychiatric/Behavioral:  Negative for agitation and confusion. The patient is not nervous/anxious.        Vital signs in last 24 hours  Temp:  [36.3 °C (97.3 °F)-36.9 °C (98.5 °F)] 36.6 °C (97.8 °F)  Pulse:  [] 100  Resp:  [14-22] 16  BP: (106-140)/(53-92) 117/92  SpO2:  [91 %-97 %] 96 %    Physical Exam  Physical Exam  Vitals and nursing note reviewed.   Constitutional:       Appearance: Normal appearance.   HENT:      Head: Normocephalic and atraumatic.   Eyes:      Pupils: Pupils are equal, round, and reactive to light.   Cardiovascular:      Rate and Rhythm: Tachycardia present. Rhythm irregular.      Heart sounds: Normal heart sounds. No murmur heard.  Pulmonary:      Effort: Pulmonary effort is normal.      Breath sounds: Normal  "breath sounds.   Abdominal:      General: Abdomen is flat.   Musculoskeletal:      Cervical back: Normal range of motion.      Right lower le+ Pitting Edema present.      Left lower le+ Pitting Edema present.   Skin:     General: Skin is warm and dry.   Neurological:      General: No focal deficit present.      Mental Status: He is alert and oriented to person, place, and time.   Psychiatric:         Mood and Affect: Mood normal.         Behavior: Behavior normal.         Thought Content: Thought content normal.         Judgment: Judgment normal.         Lab Review  Lab Results   Component Value Date/Time    WBC 7.0 10/20/2023 12:31 AM    RBC 4.40 (L) 10/20/2023 12:31 AM    HEMOGLOBIN 13.6 (L) 10/20/2023 12:31 AM    HEMATOCRIT 41.6 (L) 10/20/2023 12:31 AM    MCV 94.5 10/20/2023 12:31 AM    MCH 30.9 10/20/2023 12:31 AM    MCHC 32.7 10/20/2023 12:31 AM    MPV 11.0 10/20/2023 12:31 AM      Lab Results   Component Value Date/Time    SODIUM 142 10/20/2023 12:31 AM    POTASSIUM 4.2 10/20/2023 12:31 AM    CHLORIDE 103 10/20/2023 12:31 AM    CO2 30 10/20/2023 12:31 AM    GLUCOSE 125 (H) 10/20/2023 12:31 AM    BUN 17 10/20/2023 12:31 AM    CREATININE 1.40 10/20/2023 12:31 AM    CREATININE 1.0 2005 05:15 AM      Lab Results   Component Value Date/Time    ASTSGOT 19 10/20/2023 12:31 AM    ALTSGPT 27 10/20/2023 12:31 AM     Lab Results   Component Value Date/Time    CHOLSTRLTOT 116 2022 10:54 AM    LDL 35 2022 10:54 AM    HDL 69 2022 10:54 AM    TRIGLYCERIDE 58 2022 10:54 AM    TROPONINT 24 (H) 10/16/2023 06:55 PM       No results for input(s): \"NTPROBNP\" in the last 72 hours.      Cardiac Imaging and Procedures Review    Echocardiogram:    10/17/2023  Severely reduced left ventricular systolic function.  The left ventricular ejection fraction is visually estimated to be 30%.  There is evidence of elevated left ventricular filling pressures in the   setting of dysrhythmia.  Global " hypokinesis most pronounced in the LV apex.  Mildly dilated right ventricle.  Moderately reduced right ventricular systolic function.  Enlarged right atrium.  Mildly dilated left atrium.  Normal estimated right atrial pressure.   Mild pulmonary hypertension, estimated PASP 40mmHg.   Small pericardial effusion without evidence of hemodynamic compromise.    Assessment/Plan  No new Assessment & Plan notes have been filed under this hospital service since the last note was generated.  Service: Cardiology    Acute decompensated heart failure ef 30%  Stage C, class 3  Atrial fibrillation new   -   Continue IV diuresis until euvolemic. Strict I and O. Daily stand up weight   -  Continue losartan 50 mg daily and Toprol XL, uptitrate as tolerated for rate control  - continue farxiga and aldactone 25mg qd   - continue cardizem 30mg q 6hrs   -  Continue oral anticoagulation with Eliquis 5mg BID   - failed DCCV 10/19/2023, Plan to reattempt after IV amiodarone loading for cardioversion without need for FILIBERTO tomorrow.     2. Hyperlipidemia   -  Continue statin therapy    3. Tobacco and alcohol abuse   - Abstinence     I personally spent a total of 15 minutes which includes face-to-face time and non-face-to-face time spent on preparing to see the patient, reviewing hospital notes and tests, obtaining history from the patient, performing a medically appropriate exam, counseling and educating the patient, ordering medications/tests/procedures/referrals as clinically indicated, and documenting information in the electronic medical record.          Thank you for allowing me to participate in the care of this patient.  I will continue to follow this patient    Please contact me with any questions.    EVELYN Herrmann.

## 2023-10-20 NOTE — CARE PLAN
The patient is Stable - Low risk of patient condition declining or worsening    Shift Goals  Clinical Goals:  (Monitor abnormal labs; telemetry;)  Patient Goals: rest/sleep  Family Goals:  (DAVI)    Progress made toward(s) clinical / shift goals:      Patient is not progressing towards the following goals:

## 2023-10-21 LAB
ALBUMIN SERPL BCP-MCNC: 3.6 G/DL (ref 3.2–4.9)
ALBUMIN/GLOB SERPL: 1.8 G/DL
ALP SERPL-CCNC: 46 U/L (ref 30–99)
ALT SERPL-CCNC: 24 U/L (ref 2–50)
ANION GAP SERPL CALC-SCNC: 9 MMOL/L (ref 7–16)
AST SERPL-CCNC: 17 U/L (ref 12–45)
BILIRUB SERPL-MCNC: 0.4 MG/DL (ref 0.1–1.5)
BUN SERPL-MCNC: 15 MG/DL (ref 8–22)
CALCIUM ALBUM COR SERPL-MCNC: 8.9 MG/DL (ref 8.5–10.5)
CALCIUM SERPL-MCNC: 8.6 MG/DL (ref 8.5–10.5)
CHLORIDE SERPL-SCNC: 103 MMOL/L (ref 96–112)
CO2 SERPL-SCNC: 28 MMOL/L (ref 20–33)
CREAT SERPL-MCNC: 1.38 MG/DL (ref 0.5–1.4)
GFR SERPLBLD CREATININE-BSD FMLA CKD-EPI: 59 ML/MIN/1.73 M 2
GLOBULIN SER CALC-MCNC: 2 G/DL (ref 1.9–3.5)
GLUCOSE SERPL-MCNC: 118 MG/DL (ref 65–99)
LV EJECT FRACT  99904: 15
MAGNESIUM SERPL-MCNC: 2 MG/DL (ref 1.5–2.5)
POTASSIUM SERPL-SCNC: 3.8 MMOL/L (ref 3.6–5.5)
PROT SERPL-MCNC: 5.6 G/DL (ref 6–8.2)
SODIUM SERPL-SCNC: 140 MMOL/L (ref 135–145)

## 2023-10-21 PROCEDURE — 700102 HCHG RX REV CODE 250 W/ 637 OVERRIDE(OP): Performed by: INTERNAL MEDICINE

## 2023-10-21 PROCEDURE — 700102 HCHG RX REV CODE 250 W/ 637 OVERRIDE(OP): Performed by: NURSE PRACTITIONER

## 2023-10-21 PROCEDURE — A9270 NON-COVERED ITEM OR SERVICE: HCPCS

## 2023-10-21 PROCEDURE — A9270 NON-COVERED ITEM OR SERVICE: HCPCS | Performed by: INTERNAL MEDICINE

## 2023-10-21 PROCEDURE — 99232 SBSQ HOSP IP/OBS MODERATE 35: CPT | Mod: GC | Performed by: FAMILY MEDICINE

## 2023-10-21 PROCEDURE — 93312 ECHO TRANSESOPHAGEAL: CPT | Mod: 26 | Performed by: INTERNAL MEDICINE

## 2023-10-21 PROCEDURE — A9270 NON-COVERED ITEM OR SERVICE: HCPCS | Performed by: NURSE PRACTITIONER

## 2023-10-21 PROCEDURE — 700102 HCHG RX REV CODE 250 W/ 637 OVERRIDE(OP)

## 2023-10-21 PROCEDURE — 93325 DOPPLER ECHO COLOR FLOW MAPG: CPT | Mod: 26 | Performed by: INTERNAL MEDICINE

## 2023-10-21 PROCEDURE — 770020 HCHG ROOM/CARE - TELE (206)

## 2023-10-21 PROCEDURE — 36415 COLL VENOUS BLD VENIPUNCTURE: CPT

## 2023-10-21 PROCEDURE — 80053 COMPREHEN METABOLIC PANEL: CPT

## 2023-10-21 PROCEDURE — 700111 HCHG RX REV CODE 636 W/ 250 OVERRIDE (IP): Mod: JZ | Performed by: INTERNAL MEDICINE

## 2023-10-21 PROCEDURE — 83735 ASSAY OF MAGNESIUM: CPT

## 2023-10-21 PROCEDURE — 700105 HCHG RX REV CODE 258: Performed by: INTERNAL MEDICINE

## 2023-10-21 RX ORDER — MIDAZOLAM HYDROCHLORIDE 1 MG/ML
1 INJECTION INTRAMUSCULAR; INTRAVENOUS
Status: DISCONTINUED | OUTPATIENT
Start: 2023-10-21 | End: 2023-10-22

## 2023-10-21 RX ORDER — MIDAZOLAM HYDROCHLORIDE 1 MG/ML
1 INJECTION INTRAMUSCULAR; INTRAVENOUS
Status: DISCONTINUED | OUTPATIENT
Start: 2023-10-21 | End: 2023-10-21

## 2023-10-21 RX ADMIN — SPIRONOLACTONE 25 MG: 25 TABLET ORAL at 05:44

## 2023-10-21 RX ADMIN — FUROSEMIDE 60 MG: 10 INJECTION, SOLUTION INTRAVENOUS at 16:52

## 2023-10-21 RX ADMIN — APIXABAN 5 MG: 5 TABLET, FILM COATED ORAL at 17:30

## 2023-10-21 RX ADMIN — METFORMIN HYDROCHLORIDE 1000 MG: 500 TABLET, EXTENDED RELEASE ORAL at 17:31

## 2023-10-21 RX ADMIN — DAPAGLIFLOZIN 10 MG: 10 TABLET, FILM COATED ORAL at 05:39

## 2023-10-21 RX ADMIN — DILTIAZEM HYDROCHLORIDE 60 MG: 60 TABLET ORAL at 12:53

## 2023-10-21 RX ADMIN — DILTIAZEM HYDROCHLORIDE 60 MG: 60 TABLET ORAL at 05:38

## 2023-10-21 RX ADMIN — AMIODARONE HYDROCHLORIDE 0.5 MG/MIN: 1.8 INJECTION, SOLUTION INTRAVENOUS at 21:14

## 2023-10-21 RX ADMIN — SILVER SULFADIAZINE 1 G: 10 CREAM TOPICAL at 05:48

## 2023-10-21 RX ADMIN — DEXTROSE MONOHYDRATE 1000 ML: 50 INJECTION, SOLUTION INTRAVENOUS at 06:10

## 2023-10-21 RX ADMIN — DILTIAZEM HYDROCHLORIDE 60 MG: 60 TABLET ORAL at 17:31

## 2023-10-21 RX ADMIN — AMIODARONE HYDROCHLORIDE 0.5 MG/MIN: 1.8 INJECTION, SOLUTION INTRAVENOUS at 09:46

## 2023-10-21 RX ADMIN — FUROSEMIDE 60 MG: 10 INJECTION, SOLUTION INTRAVENOUS at 06:01

## 2023-10-21 RX ADMIN — ROSUVASTATIN CALCIUM 10 MG: 5 TABLET, FILM COATED ORAL at 05:43

## 2023-10-21 RX ADMIN — METFORMIN HYDROCHLORIDE 1000 MG: 500 TABLET, EXTENDED RELEASE ORAL at 06:00

## 2023-10-21 RX ADMIN — DILTIAZEM HYDROCHLORIDE 60 MG: 60 TABLET ORAL at 00:49

## 2023-10-21 RX ADMIN — APIXABAN 5 MG: 5 TABLET, FILM COATED ORAL at 06:02

## 2023-10-21 ASSESSMENT — ENCOUNTER SYMPTOMS
ABDOMINAL DISTENTION: 0
CONFUSION: 0
AGITATION: 0
NERVOUS/ANXIOUS: 0
COUGH: 0
ABDOMINAL PAIN: 0
DIAPHORESIS: 0
PALPITATIONS: 0
CHEST TIGHTNESS: 0
BLOOD IN STOOL: 0
DIZZINESS: 0
SHORTNESS OF BREATH: 1
CHILLS: 0
COLOR CHANGE: 0
NUMBNESS: 0
FEVER: 0
TROUBLE SWALLOWING: 0

## 2023-10-21 ASSESSMENT — FIBROSIS 4 INDEX: FIB4 SCORE: 0.99

## 2023-10-21 NOTE — PROGRESS NOTES
[facility] MEDICINE PROGRESS NOTE     Attending: Tina Arellano M.d.    Senior Resident: Alex Castro Md  Talat Resident: Shaina Bustos Md      PATIENT: Harjit Pantoja; [id number]; 1965    ID: 58 y.o. male admitted for worsening edema in the setting of recently diagnosed A. Fib     SUBJECTIVE: Remains in A-fib.  10/19 failed DCCV twice.  Patient this morning was seen with oxygen overnight, however he did not require it after waking up.  Cardiology discussed that cardioversion will now be done on 10/22.  Patient and his wife were frustrated with this as the plan was to have it today.  He does state that he feels mild improvement with his lower extremities edema.  He denies shortness of breath, chest pain, and palpitations.    OBJECTIVE:     Vitals:    10/21/23 0735 10/21/23 1124 10/21/23 1127 10/21/23 1253   BP: 113/74 108/66 135/76 128/81   Pulse: (!) 54 65 68 90   Resp:  17 18    Temp:  36.9 °C (98.5 °F) 36.3 °C (97.4 °F)    TempSrc:  Temporal Temporal    SpO2:  94% 99%    Weight:       Height:             Intake/Output Summary (Last 24 hours) at 10/21/2023 1409  Last data filed at 10/21/2023 1128  Gross per 24 hour   Intake 580 ml   Output 2650 ml   Net -2070 ml       PE:   General: No acute distress, resting comfortably in bed.  HEENT: NC/AT. PERRLA. EOMI. MMM  Cardiovascular: Normal S1/S2, regular rate irregular rhythm, no m/r/g  Respiratory: Symmetric inspiratory effort. CTAB with no adventitious sounds  Abdomen: BS+, soft, NT/ND   EXT:  BAIG, 5/5 strength, 2+ pulses, no rashes, bruising, or bleeding. 2+ pitting edema to mid calf bilaterally  Neuro: Non focal with no numbness, tingling or changes in sensation    LABS:  Recent Labs     10/20/23  0031   WBC 7.0   RBC 4.40*   HEMOGLOBIN 13.6*   HEMATOCRIT 41.6*   MCV 94.5   MCH 30.9   RDW 43.2   PLATELETCT 203   MPV 11.0     Recent Labs     10/19/23  0100 10/20/23  0031 10/21/23  0131   SODIUM 142 142 140   POTASSIUM 4.1 4.2 3.8   CHLORIDE 105 103 103   CO2  "27 30 28   BUN 14 17 15   CREATININE 0.95 1.40 1.38   CALCIUM 8.8 9.0 8.6   MAGNESIUM  --  2.1 2.0   PHOSPHORUS  --  4.6*  --    ALBUMIN 3.9 3.8 3.6     Estimated GFR/CRCL = Estimated Creatinine Clearance: 76.1 mL/min (by C-G formula based on SCr of 1.38 mg/dL).  Recent Labs     10/19/23  0100 10/20/23  0031 10/21/23  0131   GLUCOSE 117* 125* 118*     Recent Labs     10/19/23  0100 10/20/23  0031 10/21/23  0131   ASTSGOT 20 19 17   ALTSGPT 29 27 24   TBILIRUBIN 0.6 0.4 0.4   ALKPHOSPHAT 55 52 46   GLOBULIN 2.0 2.4 2.0             No results for input(s): \"INR\", \"APTT\", \"FIBRINOGEN\" in the last 72 hours.    Invalid input(s): \"DIMER\"    MICROBIOLOGY:   Results       ** No results found for the last 168 hours. **              IMAGING:   EC-FILIBERTO W/O CONT   Final Result      EC-ECHOCARDIOGRAM COMPLETE W/ CONT   Final Result      DX-CHEST-PORTABLE (1 VIEW)   Final Result      1.  Bilateral atelectasis and enlargement the cardiac silhouette      2.  Probable small bilateral pleural effusion      CL-CARDIOVERSION    (Results Pending)       MEDS:  Current Facility-Administered Medications   Medication Last Admin    fentaNYL (Sublimaze) injection 25 mcg      midazolam (Versed) injection 1 mg      silver sulfADIAZINE (Silvadene) 1 % cream 1 g at 10/21/23 0548    dilTIAZem (Cardizem) tablet 60 mg 60 mg at 10/21/23 1253    losartan (Cozaar) tablet 50 mg 50 mg at 10/20/23 0618    spironolactone (Aldactone) tablet 25 mg 25 mg at 10/21/23 0544    dapagliflozin propanediol (Farxiga) tablet 10 mg 10 mg at 10/21/23 0539    furosemide (Lasix) injection 60 mg 60 mg at 10/21/23 0601    dextrose 5% infusion 1,000 mL at 10/21/23 0610    amiodarone (Nexterone) 360 mg/200 mL infusion 0.5 mg/min at 10/21/23 0946    metoprolol SR (Toprol XL) tablet 100 mg 100 mg at 10/20/23 0618    senna-docusate (Pericolace Or Senokot S) 8.6-50 MG per tablet 2 Tablet 2 Tablet at 10/19/23 0514    And    polyethylene glycol/lytes (Miralax) PACKET 1 Packet      " And    magnesium hydroxide (Milk Of Magnesia) suspension 30 mL      And    bisacodyl (Dulcolax) suppository 10 mg      acetaminophen (Tylenol) tablet 650 mg      labetalol (Normodyne/Trandate) injection 10 mg      apixaban (Eliquis) tablet 5 mg 5 mg at 10/21/23 0602    metFORMIN ER (Glucophage XR) tablet 1,000 mg 1,000 mg at 10/21/23 0600    rosuvastatin (Crestor) tablet 10 mg 10 mg at 10/21/23 0543       ASSESSMENT/PLAN: Harjit Pantoja is a 58 y.o. male admitted for worsening edema in the setting of recently diagnosed atrial fibrillation.      * Hypervolemia- (present on admission)  Assessment & Plan  #CHF new dx with EF of 30%  #hypoxia (resolved)    Patient presents the ER with fluid overload, including orthopnea and bilateral lower extremity pitting edema with 10 pounds of weight gain over the last several days.  He also reports dyspnea on exertion.  He breathes well at rest.  Patient does have history of heavy alcohol use, quitting 3 weeks ago.  NT-proBNP was elevated at 6000.  He has never seen a cardiologist.  Symptoms are highly suggestive of heart failure exacerbation.  Patient is at risk for liver disease based on alcohol use history, however transaminases, alk phos, bilirubin all normal on labs, and physical exam suggestive more of heart failure than liver failure. TSH unremarkable    -Echo completed 10/17, shows EF 30% and global hypokenisis  -appreciate card's reccs below:    -   Continue IV diuresis until euvolemic. Strict I and O. Daily stand up weight, cardiology commend continuing diuresis even with increased creatinine as this might be due to a cardiorenal syndrome   - Metoprolol 100 mg, uptitrate as tolerated for rate control    - Losartan 50 mg daily    - Farxiga    - Aldactone    - Cardizem 30 mg q 6hrs    - Eliquis oral anticoagulation     - Amiodarone continued for cardioversion planned 10/22/2023, notes that it will be performed at bedside per cardiology  - Abstinence from tobacco and  alcohol  - Strict I's and O's  - Daily weights  - Continue home statin  -continue vs monitoring      Type 2 diabetes mellitus (HCC)  Assessment & Plan  History of diabetes.  Last A1c noted to be 5.9 in December 2022. Treated with metformin.   A1c 6.4 this admission    -Continue home metformin  -educate on lifestyle changes I.e. diet and exercise    A-fib (HCC)  Assessment & Plan  Patient has recently diagnosed A-fib.  CHA2DS2-VASc=3.  He was started on Eliquis outpatient.  Patient's heart rate has been rate controlled however when in room with patient and he laughs or becomes active heart rate does increase to the low 100s a few times into the 110s.    -Amiodarone drip with plan for repeat cardioversion 10/22/23  - Continue Eliquis  - appreciate cards reccs  - cont Metoprolol, up titrate prn  - Telemetry      Has recently quit alcohol use  Assessment & Plan  Patient quit drinking 3 weeks ago.  He previously had 1 year of sobriety.  He is currently using marijuana Gummies.  He has not gone to groups although he has tried a few AA meetings, but is not interested in going back.  Labs are not suggestive of liver failure (PLT, albumin, bili, liver enzymes all WNL).  Patient was with wife today who reports that he is highly motivated to not have any alcoholic beverages.    - Will provide resources at discharge           Core Measures:  Fluids: None  Lines: PIV  Abx: None  Diet: Diabetic with 2 g salt restriction and 2 L fluid restriction  PPX: SCDs and Eliquis  DISPO: Patient to remain inpatient for further medical management, pending cardioversion 10/22/23      CODE STATUS: Full

## 2023-10-21 NOTE — CARE PLAN
Problem: Knowledge Deficit - Standard  Goal: Patient and family/care givers will demonstrate understanding of plan of care, disease process/condition, diagnostic tests and medications  Outcome: Progressing     Problem: Care Map:  Admission Optimal Outcome for the Heart Failure Patient  Goal: Admission:  Optimal Care of the heart failure patient  Outcome: Progressing   The patient is Watcher - Medium risk of patient condition declining or worsening    Shift Goals  Clinical Goals: telemetry; I&Os; amiodarone drip  Patient Goals: rest, sleep and to get his heart rate regulr  Family Goals: DAVI    Progress made toward(s) clinical / shift goals:  patient to have cardioversion again today. Hypotensive with am check. Losartan not given. Topral XL held.     Patient is not progressing towards the following goals:

## 2023-10-21 NOTE — CARE PLAN
The patient is Stable - Low risk of patient condition declining or worsening    Shift Goals  Clinical Goals: telemetry; I&Os; amiodarone drip  Patient Goals: rest, sleep and to get his heart rate regulr  Family Goals: DAVI    Progress made toward(s) clinical / shift goals:      Problem: Knowledge Deficit - Standard  Goal: Patient and family/care givers will demonstrate understanding of plan of care, disease process/condition, diagnostic tests and medications  Outcome: Progressing  Pt has rested in room this shift, he denies c/o pain when asked and he remains free from non-verbal s/sx of discomfort at this time.  He verbalizes understanding of meds, labs, and potential tests and procedures.     Patient is not progressing towards the following goals:

## 2023-10-21 NOTE — PROGRESS NOTES
Cardiology Follow Up Progress Note    Date of Service  10/21/2023    Attending Physician  Chaparro Archer M.D.    Chief Complaint   Orthopnea and HAGAN     Cardiology consult   Afib and CHF    HPI  Harjit Pantoja is a 58 y.o. male admitted 10/16/2023 with medical history of alcohol and tobacco abuse. Presented with HAGAN and progressive orthopnea for several weeks. Noted to in afib about a week ago as outpatient, started on GDMT.    Presented with orthopnea and HAGAN, noted to be in afib with RVR. ECHO showed reduced ef 30%     Interim Events  Remains in A-fib on telemetry.  Patient and wife at the bedside patient is frustrated, discussed need for volume optimization amiodarone load prior to repeating cardioversion.    Review of Systems  Review of Systems   Constitutional:  Negative for chills, diaphoresis and fever.   HENT:  Negative for nosebleeds and trouble swallowing.    Respiratory:  Positive for shortness of breath. Negative for cough and chest tightness.    Cardiovascular:  Positive for leg swelling. Negative for chest pain and palpitations.   Gastrointestinal:  Negative for abdominal distention, abdominal pain and blood in stool.   Genitourinary:  Negative for hematuria.   Skin:  Negative for color change.   Neurological:  Negative for dizziness, syncope and numbness.   Psychiatric/Behavioral:  Negative for agitation and confusion. The patient is not nervous/anxious.        Vital signs in last 24 hours  Temp:  [36.3 °C (97.3 °F)-36.7 °C (98.1 °F)] 36.7 °C (98.1 °F)  Pulse:  [] 54  Resp:  [14-18] 18  BP: ()/(52-83) 113/74  SpO2:  [90 %-97 %] 92 %    Physical Exam  Physical Exam  Vitals and nursing note reviewed.   Constitutional:       Appearance: Normal appearance.   HENT:      Head: Normocephalic and atraumatic.   Eyes:      Pupils: Pupils are equal, round, and reactive to light.   Cardiovascular:      Rate and Rhythm: Tachycardia present. Rhythm irregular.      Heart sounds: Normal heart  "sounds. No murmur heard.  Pulmonary:      Effort: Pulmonary effort is normal.      Breath sounds: Normal breath sounds.   Abdominal:      General: Abdomen is flat.   Musculoskeletal:      Cervical back: Normal range of motion.      Right lower le+ Pitting Edema present.      Left lower le+ Pitting Edema present.   Skin:     General: Skin is warm and dry.   Neurological:      General: No focal deficit present.      Mental Status: He is alert and oriented to person, place, and time.   Psychiatric:         Mood and Affect: Mood normal.         Behavior: Behavior normal.         Thought Content: Thought content normal.         Judgment: Judgment normal.         Lab Review  Lab Results   Component Value Date/Time    WBC 7.0 10/20/2023 12:31 AM    RBC 4.40 (L) 10/20/2023 12:31 AM    HEMOGLOBIN 13.6 (L) 10/20/2023 12:31 AM    HEMATOCRIT 41.6 (L) 10/20/2023 12:31 AM    MCV 94.5 10/20/2023 12:31 AM    MCH 30.9 10/20/2023 12:31 AM    MCHC 32.7 10/20/2023 12:31 AM    MPV 11.0 10/20/2023 12:31 AM      Lab Results   Component Value Date/Time    SODIUM 140 10/21/2023 01:31 AM    POTASSIUM 3.8 10/21/2023 01:31 AM    CHLORIDE 103 10/21/2023 01:31 AM    CO2 28 10/21/2023 01:31 AM    GLUCOSE 118 (H) 10/21/2023 01:31 AM    BUN 15 10/21/2023 01:31 AM    CREATININE 1.38 10/21/2023 01:31 AM    CREATININE 1.0 2005 05:15 AM      Lab Results   Component Value Date/Time    ASTSGOT 17 10/21/2023 01:31 AM    ALTSGPT 24 10/21/2023 01:31 AM     Lab Results   Component Value Date/Time    CHOLSTRLTOT 116 2022 10:54 AM    LDL 35 2022 10:54 AM    HDL 69 2022 10:54 AM    TRIGLYCERIDE 58 2022 10:54 AM    TROPONINT 24 (H) 10/16/2023 06:55 PM       No results for input(s): \"NTPROBNP\" in the last 72 hours.      Cardiac Imaging and Procedures Review    Echocardiogram:    10/17/2023  Severely reduced left ventricular systolic function.  The left ventricular ejection fraction is visually estimated to be 30%.  There is " evidence of elevated left ventricular filling pressures in the   setting of dysrhythmia.  Global hypokinesis most pronounced in the LV apex.  Mildly dilated right ventricle.  Moderately reduced right ventricular systolic function.  Enlarged right atrium.  Mildly dilated left atrium.  Normal estimated right atrial pressure.   Mild pulmonary hypertension, estimated PASP 40mmHg.   Small pericardial effusion without evidence of hemodynamic compromise.    Assessment/Plan  No new Assessment & Plan notes have been filed under this hospital service since the last note was generated.  Service: Cardiology    Acute decompensated heart failure ef 30%  Stage C, class 3  Atrial fibrillation new   -   Continue IV diuresis until euvolemic. Strict I and O. Daily stand up weight   -  Continue losartan 50 mg daily and Toprol XL, uptitrate as tolerated for rate control  - continue farxiga and aldactone 25mg qd   - continue cardizem 30mg q 6hrs   -  Continue oral anticoagulation with Eliquis 5mg BID   - failed DCCV 10/19/2023, patient to be n.p.o. at midnight we will plan for repeat DCCV on 10/22/2023 at the bedside    2. Hyperlipidemia   -  Continue statin therapy    3. Tobacco and alcohol abuse   - Abstinence     I personally spent a total of 15 minutes which includes face-to-face time and non-face-to-face time spent on preparing to see the patient, reviewing hospital notes and tests, obtaining history from the patient, performing a medically appropriate exam, counseling and educating the patient, ordering medications/tests/procedures/referrals as clinically indicated, and documenting information in the electronic medical record.      Thank you for allowing us to participate in the care of this patient.    Brunilda Gaffney, MSN, APRN  [facility]  [phone]    IBrunilda, A.P.R.N. performed a substantiated portion of the service face-to-face with the same  patient on the same date of  service INDEPENDENTLY OF <Dr. Sebastian> FOR 15 MINUTES. I was  personally involved in reviewing and conducting elements of the history, exam and/or  medical decision making, including the information as described above.    Please note this dictation was created using voice recognition software.  I have made every reasonable attempt to correct obvious errors, but there may be errors of grammar and possibly content that I did not discover before finalizing the note.

## 2023-10-22 ENCOUNTER — PHARMACY VISIT (OUTPATIENT)
Dept: PHARMACY | Facility: MEDICAL CENTER | Age: 58
End: 2023-10-22
Payer: COMMERCIAL

## 2023-10-22 VITALS
HEART RATE: 76 BPM | BODY MASS INDEX: 33.98 KG/M2 | HEIGHT: 72 IN | DIASTOLIC BLOOD PRESSURE: 59 MMHG | TEMPERATURE: 97.7 F | OXYGEN SATURATION: 96 % | SYSTOLIC BLOOD PRESSURE: 97 MMHG | WEIGHT: 250.88 LBS | RESPIRATION RATE: 15 BRPM

## 2023-10-22 PROBLEM — E87.70 HYPERVOLEMIA: Status: RESOLVED | Noted: 2023-10-16 | Resolved: 2023-10-22

## 2023-10-22 LAB
ANION GAP SERPL CALC-SCNC: 9 MMOL/L (ref 7–16)
BUN SERPL-MCNC: 17 MG/DL (ref 8–22)
CALCIUM SERPL-MCNC: 8.9 MG/DL (ref 8.5–10.5)
CHLORIDE SERPL-SCNC: 102 MMOL/L (ref 96–112)
CO2 SERPL-SCNC: 32 MMOL/L (ref 20–33)
CREAT SERPL-MCNC: 1.54 MG/DL (ref 0.5–1.4)
ERYTHROCYTE [DISTWIDTH] IN BLOOD BY AUTOMATED COUNT: 43.3 FL (ref 35.9–50)
GFR SERPLBLD CREATININE-BSD FMLA CKD-EPI: 52 ML/MIN/1.73 M 2
GLUCOSE SERPL-MCNC: 154 MG/DL (ref 65–99)
HCT VFR BLD AUTO: 42.3 % (ref 42–52)
HGB BLD-MCNC: 13.6 G/DL (ref 14–18)
MCH RBC QN AUTO: 30.4 PG (ref 27–33)
MCHC RBC AUTO-ENTMCNC: 32.2 G/DL (ref 32.3–36.5)
MCV RBC AUTO: 94.4 FL (ref 81.4–97.8)
PLATELET # BLD AUTO: 169 K/UL (ref 164–446)
PMV BLD AUTO: 11.3 FL (ref 9–12.9)
POTASSIUM SERPL-SCNC: 3.7 MMOL/L (ref 3.6–5.5)
RBC # BLD AUTO: 4.48 M/UL (ref 4.7–6.1)
SODIUM SERPL-SCNC: 143 MMOL/L (ref 135–145)
WBC # BLD AUTO: 7.6 K/UL (ref 4.8–10.8)

## 2023-10-22 PROCEDURE — A9270 NON-COVERED ITEM OR SERVICE: HCPCS | Performed by: INTERNAL MEDICINE

## 2023-10-22 PROCEDURE — 700102 HCHG RX REV CODE 250 W/ 637 OVERRIDE(OP)

## 2023-10-22 PROCEDURE — 700111 HCHG RX REV CODE 636 W/ 250 OVERRIDE (IP): Mod: JZ | Performed by: INTERNAL MEDICINE

## 2023-10-22 PROCEDURE — 94799 UNLISTED PULMONARY SVC/PX: CPT

## 2023-10-22 PROCEDURE — 92960 CARDIOVERSION ELECTRIC EXT: CPT | Performed by: INTERNAL MEDICINE

## 2023-10-22 PROCEDURE — RXMED WILLOW AMBULATORY MEDICATION CHARGE: Performed by: NURSE PRACTITIONER

## 2023-10-22 PROCEDURE — A9270 NON-COVERED ITEM OR SERVICE: HCPCS | Performed by: NURSE PRACTITIONER

## 2023-10-22 PROCEDURE — 80048 BASIC METABOLIC PNL TOTAL CA: CPT

## 2023-10-22 PROCEDURE — 700102 HCHG RX REV CODE 250 W/ 637 OVERRIDE(OP): Performed by: NURSE PRACTITIONER

## 2023-10-22 PROCEDURE — A9270 NON-COVERED ITEM OR SERVICE: HCPCS

## 2023-10-22 PROCEDURE — 700111 HCHG RX REV CODE 636 W/ 250 OVERRIDE (IP): Mod: JZ | Performed by: NURSE PRACTITIONER

## 2023-10-22 PROCEDURE — RXMED WILLOW AMBULATORY MEDICATION CHARGE: Performed by: BEHAVIOR ANALYST

## 2023-10-22 PROCEDURE — 99152 MOD SED SAME PHYS/QHP 5/>YRS: CPT | Performed by: INTERNAL MEDICINE

## 2023-10-22 PROCEDURE — 700111 HCHG RX REV CODE 636 W/ 250 OVERRIDE (IP): Mod: JZ

## 2023-10-22 PROCEDURE — 85027 COMPLETE CBC AUTOMATED: CPT

## 2023-10-22 PROCEDURE — 36415 COLL VENOUS BLD VENIPUNCTURE: CPT

## 2023-10-22 PROCEDURE — 99232 SBSQ HOSP IP/OBS MODERATE 35: CPT | Mod: 25 | Performed by: INTERNAL MEDICINE

## 2023-10-22 PROCEDURE — 99238 HOSP IP/OBS DSCHRG MGMT 30/<: CPT | Mod: GC | Performed by: FAMILY MEDICINE

## 2023-10-22 PROCEDURE — 700102 HCHG RX REV CODE 250 W/ 637 OVERRIDE(OP): Performed by: INTERNAL MEDICINE

## 2023-10-22 RX ORDER — SPIRONOLACTONE 25 MG/1
25 TABLET ORAL DAILY
Qty: 30 TABLET | Refills: 3 | Status: CANCELLED | OUTPATIENT
Start: 2023-10-23

## 2023-10-22 RX ORDER — MIDAZOLAM HYDROCHLORIDE 1 MG/ML
1-5 INJECTION INTRAMUSCULAR; INTRAVENOUS
Status: COMPLETED | OUTPATIENT
Start: 2023-10-22 | End: 2023-10-22

## 2023-10-22 RX ORDER — AMIODARONE HYDROCHLORIDE 200 MG/1
200 TABLET ORAL DAILY
Qty: 30 TABLET | Refills: 0 | Status: SHIPPED | OUTPATIENT
Start: 2023-10-22 | End: 2023-10-22

## 2023-10-22 RX ORDER — FUROSEMIDE 40 MG/1
40 TABLET ORAL DAILY
Qty: 30 TABLET | Refills: 0 | Status: SHIPPED | OUTPATIENT
Start: 2023-10-22 | End: 2023-11-07 | Stop reason: SDUPTHER

## 2023-10-22 RX ORDER — SPIRONOLACTONE 25 MG/1
25 TABLET ORAL DAILY
Qty: 30 TABLET | Refills: 3 | Status: SHIPPED | OUTPATIENT
Start: 2023-10-23 | End: 2023-11-07 | Stop reason: SDUPTHER

## 2023-10-22 RX ORDER — LOSARTAN POTASSIUM 50 MG/1
50 TABLET ORAL
Status: DISCONTINUED | OUTPATIENT
Start: 2023-10-22 | End: 2023-10-22 | Stop reason: HOSPADM

## 2023-10-22 RX ORDER — METOPROLOL SUCCINATE 100 MG/1
100 TABLET, EXTENDED RELEASE ORAL DAILY
Qty: 30 TABLET | Refills: 11 | Status: CANCELLED | OUTPATIENT
Start: 2023-10-23

## 2023-10-22 RX ORDER — LOSARTAN POTASSIUM 50 MG/1
50 TABLET ORAL DAILY
Qty: 30 TABLET | Refills: 0 | Status: SHIPPED | OUTPATIENT
Start: 2023-10-22 | End: 2023-10-30

## 2023-10-22 RX ORDER — DAPAGLIFLOZIN 10 MG/1
10 TABLET, FILM COATED ORAL DAILY
Qty: 30 TABLET | Refills: 0 | Status: SHIPPED | OUTPATIENT
Start: 2023-10-23 | End: 2023-11-07 | Stop reason: SDUPTHER

## 2023-10-22 RX ORDER — METOPROLOL SUCCINATE 100 MG/1
100 TABLET, EXTENDED RELEASE ORAL DAILY
Qty: 30 TABLET | Refills: 0 | Status: SHIPPED | OUTPATIENT
Start: 2023-10-23 | End: 2023-10-30

## 2023-10-22 RX ORDER — AMIODARONE HYDROCHLORIDE 200 MG/1
200 TABLET ORAL DAILY
Qty: 30 TABLET | Refills: 11 | Status: SHIPPED | OUTPATIENT
Start: 2023-10-22 | End: 2023-11-07

## 2023-10-22 RX ORDER — DILTIAZEM HYDROCHLORIDE 240 MG/1
240 CAPSULE, COATED, EXTENDED RELEASE ORAL DAILY
Qty: 30 CAPSULE | Refills: 0 | Status: SHIPPED | OUTPATIENT
Start: 2023-10-22 | End: 2023-10-22

## 2023-10-22 RX ORDER — FUROSEMIDE 40 MG/1
40 TABLET ORAL
Status: DISCONTINUED | OUTPATIENT
Start: 2023-10-23 | End: 2023-10-22 | Stop reason: HOSPADM

## 2023-10-22 RX ORDER — LOSARTAN POTASSIUM 50 MG/1
50 TABLET ORAL DAILY
Qty: 30 TABLET | Refills: 11 | Status: CANCELLED | OUTPATIENT
Start: 2023-10-22

## 2023-10-22 RX ORDER — DAPAGLIFLOZIN 10 MG/1
10 TABLET, FILM COATED ORAL DAILY
Qty: 30 TABLET | Refills: 11 | Status: CANCELLED | OUTPATIENT
Start: 2023-10-23

## 2023-10-22 RX ORDER — FUROSEMIDE 40 MG/1
40 TABLET ORAL DAILY
Qty: 30 TABLET | Refills: 11 | Status: CANCELLED | OUTPATIENT
Start: 2023-10-23

## 2023-10-22 RX ORDER — MIDAZOLAM HYDROCHLORIDE 1 MG/ML
INJECTION INTRAMUSCULAR; INTRAVENOUS
Status: COMPLETED
Start: 2023-10-22 | End: 2023-10-22

## 2023-10-22 RX ORDER — FUROSEMIDE 10 MG/ML
40 INJECTION INTRAMUSCULAR; INTRAVENOUS ONCE
Status: COMPLETED | OUTPATIENT
Start: 2023-10-22 | End: 2023-10-22

## 2023-10-22 RX ORDER — DILTIAZEM HYDROCHLORIDE 240 MG/1
240 CAPSULE, COATED, EXTENDED RELEASE ORAL DAILY
Qty: 30 CAPSULE | Refills: 11 | Status: SHIPPED | OUTPATIENT
Start: 2023-10-23 | End: 2023-10-30

## 2023-10-22 RX ADMIN — DILTIAZEM HYDROCHLORIDE 60 MG: 60 TABLET ORAL at 11:55

## 2023-10-22 RX ADMIN — AMIODARONE HYDROCHLORIDE 0.5 MG/MIN: 1.8 INJECTION, SOLUTION INTRAVENOUS at 09:30

## 2023-10-22 RX ADMIN — MIDAZOLAM HYDROCHLORIDE 1 MG: 1 INJECTION, SOLUTION INTRAMUSCULAR; INTRAVENOUS at 10:03

## 2023-10-22 RX ADMIN — DAPAGLIFLOZIN 10 MG: 10 TABLET, FILM COATED ORAL at 05:45

## 2023-10-22 RX ADMIN — FUROSEMIDE 60 MG: 10 INJECTION, SOLUTION INTRAVENOUS at 05:45

## 2023-10-22 RX ADMIN — MIDAZOLAM 2 MG: 1 INJECTION, SOLUTION INTRAMUSCULAR; INTRAVENOUS at 09:53

## 2023-10-22 RX ADMIN — MIDAZOLAM HYDROCHLORIDE 2 MG: 1 INJECTION, SOLUTION INTRAMUSCULAR; INTRAVENOUS at 09:53

## 2023-10-22 RX ADMIN — FUROSEMIDE 40 MG: 10 INJECTION, SOLUTION INTRAVENOUS at 11:55

## 2023-10-22 RX ADMIN — SPIRONOLACTONE 25 MG: 25 TABLET ORAL at 05:46

## 2023-10-22 RX ADMIN — FENTANYL CITRATE 25 MCG: 50 INJECTION, SOLUTION INTRAMUSCULAR; INTRAVENOUS at 09:58

## 2023-10-22 RX ADMIN — APIXABAN 5 MG: 5 TABLET, FILM COATED ORAL at 05:47

## 2023-10-22 RX ADMIN — DILTIAZEM HYDROCHLORIDE 60 MG: 60 TABLET ORAL at 05:45

## 2023-10-22 RX ADMIN — FENTANYL CITRATE 25 MCG: 50 INJECTION, SOLUTION INTRAMUSCULAR; INTRAVENOUS at 09:52

## 2023-10-22 RX ADMIN — MIDAZOLAM HYDROCHLORIDE 1 MG: 1 INJECTION, SOLUTION INTRAMUSCULAR; INTRAVENOUS at 10:11

## 2023-10-22 RX ADMIN — DILTIAZEM HYDROCHLORIDE 60 MG: 60 TABLET ORAL at 00:41

## 2023-10-22 RX ADMIN — METOPROLOL SUCCINATE 100 MG: 25 TABLET, EXTENDED RELEASE ORAL at 05:46

## 2023-10-22 RX ADMIN — FENTANYL CITRATE 25 MCG: 50 INJECTION, SOLUTION INTRAMUSCULAR; INTRAVENOUS at 10:06

## 2023-10-22 RX ADMIN — FENTANYL CITRATE 25 MCG: 50 INJECTION, SOLUTION INTRAMUSCULAR; INTRAVENOUS at 10:11

## 2023-10-22 RX ADMIN — ROSUVASTATIN CALCIUM 10 MG: 5 TABLET, FILM COATED ORAL at 05:47

## 2023-10-22 RX ADMIN — SILVER SULFADIAZINE 1 G: 10 CREAM TOPICAL at 05:47

## 2023-10-22 ASSESSMENT — ENCOUNTER SYMPTOMS
DIZZINESS: 0
CHEST TIGHTNESS: 0
COUGH: 0
NERVOUS/ANXIOUS: 0
NUMBNESS: 0
CHILLS: 0
ABDOMINAL DISTENTION: 0
COLOR CHANGE: 0
ABDOMINAL PAIN: 0
CONFUSION: 0
PALPITATIONS: 0
AGITATION: 0
FEVER: 0
DIAPHORESIS: 0
BLOOD IN STOOL: 0
SHORTNESS OF BREATH: 1
TROUBLE SWALLOWING: 0

## 2023-10-22 ASSESSMENT — FIBROSIS 4 INDEX: FIB4 SCORE: 1.19

## 2023-10-22 NOTE — CARE PLAN
The patient is Watcher - Medium risk of patient condition declining or worsening    Shift Goals  Clinical Goals: monitor heart, I/O,  Patient Goals: rest, sleep  Family Goals: DAVI    Patient continues to wait on cardioversion or for medications to work. Is not symptomatic. Affect and attitude pleasant and cooperative/.Has remained NPO since midnight except for sips with meds.      Progress made toward(s) clinical / shift goals:    Problem: Knowledge Deficit - Standard  Goal: Patient and family/care givers will demonstrate understanding of plan of care, disease process/condition, diagnostic tests and medications  Outcome: Progressing     Problem: Care Map:  Admission Optimal Outcome for the Heart Failure Patient  Goal: Admission:  Optimal Care of the heart failure patient  Outcome: Progressing       Patient is not progressing towards the following goals:

## 2023-10-22 NOTE — PROCEDURES
PREOPERATIVE DIAGNOSIS:  1. Atrial fibrillation     POSTOPERATIVE DIAGNOSIS:  1.  Unsuccessful cardioversion from atrial fibrillation to sinus rhythm    DESCRIPTION OF PROCEDURE:    I have discussed the risks and benefits of electrical cardioversion as well as the procedure itself, rationale and appropriateness in detail with the patient today. Complications including but not limited to death, stroke, MI, ACLS, aspiration and complications related to anesthesia were explained to the patient. The potential outcomes associated with the procedure were also discussed at length. The patient agrees to proceed.    The patient was transported to the procedure area in the fasting state. The anticoagulation status of the patient was verified prior to the procedure. Anterior-posterior cardioversion pads were placed.  Patient was administered fentanyl and Versed adequate to achieve sedation. Following achievement of adequate sedation, two synchronized 200 J shocks were administered with immediate reversion to sinus rhythm but he has immediate reversion . Patient recovered well from anesthesia. No immediate complications were noted.    Sedation time:  Moderate sedation directly monitored by me during the case while supervising the administration of the sedation medication by an independent trained RN to assist in the monitoring of the patient's level of consciousness and physiological status. I, the supervising physician was present the entire time from beginning of medication administration until the end of the procedure from 0945 until 1020. For detailed administration records please see the moderate sedation documentation in the media tab.      Ottoniel Sebastian MD, FACC, [facility]

## 2023-10-22 NOTE — DISCHARGE SUMMARY
[facility] Discharge Summary    Attending: Tina Arellano M.d.  Senior Resident: Dr. Alex Castro  Intern:  Dr. Shaina Bustos      CHIEF COMPLAINT ON ADMISSION  Chief Complaint   Patient presents with    Sent by MD     Patient ambulates to triage c/o sob, edema and palpitations that started a week ago, also reports orthopnea.        Reason for Admission  Afib/Sent by MD     Admission Date  10/16/2023    CODE STATUS  Full Code    HPI & HOSPITAL COURSE  This is a 58 y.o. male here with dyspnea on exertion and progressive orthopnea for several weeks with a recent diagnosis of a-fib 1 week prior to admission started on GDMT by PCP.      Patient during his hospitalization was diuresed due to lower extremity edema. His Echo was notable for LVEF on 30%, resulting in a new diagnosis of decompensated heart failure. Cardiology was consulted for additional medications and management. Patient had an unsuccessful FILIBERTO guided cardioversion on 10/19. His rates after were poorly controlled requiring amiodarone drip. Patient continued to remain in afib with better rate control and on 10/22 had another unsuccessful cardioversion at bedside. He was diuresed further and cardiology recommended that patient follow-up outpatient for further management and switching medications to oral.     Therefore, he is discharged in good and stable condition to home with close outpatient follow-up.    The patient met 2-midnight criteria for an inpatient stay at the time of discharge.    Discharge Date  10/22/23    Physical Exam on Day of Discharge  Physical Exam  Constitutional:       Appearance: Normal appearance.   HENT:      Head: Normocephalic.      Mouth/Throat:      Mouth: Mucous membranes are moist.      Pharynx: Oropharynx is clear.   Eyes:      Extraocular Movements: Extraocular movements intact.      Conjunctiva/sclera: Conjunctivae normal.   Cardiovascular:      Rate and Rhythm: Normal rate. Rhythm irregular.      Pulses: Normal  pulses.   Pulmonary:      Effort: Pulmonary effort is normal.      Breath sounds: Normal breath sounds. No wheezing.   Abdominal:      General: Abdomen is flat. Bowel sounds are normal.      Tenderness: There is no abdominal tenderness.   Musculoskeletal:         General: Normal range of motion.      Right lower leg: Edema present.      Left lower leg: Edema present.      Comments: Mild edema improving from previous exams   Skin:     General: Skin is warm and dry.      Capillary Refill: Capillary refill takes less than 2 seconds.   Neurological:      General: No focal deficit present.      Mental Status: He is alert and oriented to person, place, and time.   Psychiatric:         Mood and Affect: Mood normal.         FOLLOW UP ITEMS POST DISCHARGE  Outpatient follow-up with cardiology   Referral to Sleep Medicine     DISCHARGE DIAGNOSES  Principal Problem (Resolved):    Hypervolemia (POA: Yes)  Active Problems:    Has recently quit alcohol use (POA: Unknown)    A-fib (HCC) (POA: Unknown)    Heart failure (HCC) (POA: Unknown)    Type 2 diabetes mellitus (HCC) (POA: Unknown)      FOLLOW UP  Future Appointments   Date Time Provider Department Center   10/30/2023  1:15 PM Ira Bacon M.D. [facility] None     Du Mullen M.D.  [address] [facility] [address]  [phone]    Go on 10/26/2023  Please go to your follow up appointment with Du Mullen M.D. on Thursday October 26, 2023 at 11:30am.      MEDICATIONS ON DISCHARGE     Medication List        START taking these medications        Instructions   amiodarone 200 MG Tabs  Commonly known as: Cordarone   Take 1 Tablet by mouth every day.  Dose: 200 mg     dapagliflozin propanediol 10 MG Tabs  Start taking on: October 23, 2023  Commonly known as: Farxiga   Take 1 Tablet by mouth every day.  Dose: 10 mg     dilTIAZem  MG Cp24  Start taking on: October 23, 2023  Commonly known as: Cardizem CD   Take 1 Capsule by mouth every day.  Dose: 240 mg      furosemide 40 MG Tabs  Commonly known as: Lasix   Take 1 Tablet by mouth every day.  Dose: 40 mg     losartan 50 MG Tabs  Commonly known as: Cozaar   Take 1 Tablet by mouth every day.  Dose: 50 mg     metoprolol  MG Tb24  Start taking on: October 23, 2023  Commonly known as: Toprol XL   Take 1 Tablet by mouth every day.  Dose: 100 mg     spironolactone 25 MG Tabs  Start taking on: October 23, 2023  Commonly known as: Aldactone   Take 1 Tablet by mouth every day.  Dose: 25 mg            CONTINUE taking these medications        Instructions   Eliquis 5mg Tabs  Generic drug: apixaban   Take 5 mg by mouth 2 times a day.  Dose: 5 mg     metFORMIN  MG Tb24  Commonly known as: Glucophage XR   Take 1,000 mg by mouth 2 times a day. 1,000 mg = 2 tabs  Dose: 1,000 mg     PROBIOTIC PO   Take 1 Tablet by mouth every day.  Dose: 1 Tablet     rosuvastatin 10 MG Tabs  Commonly known as: Crestor   Take 10 mg by mouth every day.  Dose: 10 mg            STOP taking these medications      metoprolol tartrate 50 MG Tabs  Commonly known as: Lopressor              Allergies  Allergies   Allergen Reactions    Cleocin [Clindamycin Hcl] Hives    Pcn [Penicillins] Hives       DIET  Orders Placed This Encounter   Procedures    Diet NPO Restrict to: Sips with Medications     Standing Status:   Standing     Number of Occurrences:   8     Order Specific Question:   Diet NPO Restrict to:     Answer:   Sips with Medications [3]       ACTIVITY  As tolerated.  Weight bearing as tolerated    CONSULTATIONS  Cardiology     PROCEDURES  Cardioversion with FILIBERTO   Bedside cardioversion     LABORATORY  Lab Results   Component Value Date    SODIUM 143 10/22/2023    POTASSIUM 3.7 10/22/2023    CHLORIDE 102 10/22/2023    CO2 32 10/22/2023    GLUCOSE 154 (H) 10/22/2023    BUN 17 10/22/2023    CREATININE 1.54 (H) 10/22/2023    CREATININE 1.0 08/28/2005        Lab Results   Component Value Date    WBC 7.6 10/22/2023    HEMOGLOBIN 13.6 (L) 10/22/2023     HEMATOCRIT 42.3 10/22/2023    PLATELETCT 169 10/22/2023

## 2023-10-22 NOTE — CARE PLAN
The patient is Stable - Low risk of patient condition declining or worsening    Shift Goals  Clinical Goals: NPO for cardioversion; monitor telemetry; promote comfort; maintain safety  Patient Goals: get cardioversion and go home  Family Goals: DAVI    Progress made toward(s) clinical / shift goals:      Pt has rested well in bed this shift, alternating between lying and sitting up to the EOB.  He has denied c/o pain when asked and he remains free from non-verbal s/sx of discomfort at this time.  He is able to make his wants/needs known.  He tolerated his cardioversion well this AM.  Pt is being discharged home this afternoon.  Safety measures in place, call light within reach.    Problem: Knowledge Deficit - Standard  Goal: Patient and family/care givers will demonstrate understanding of plan of care, disease process/condition, diagnostic tests and medications  Outcome: Met  Pt and wife are able to verbalize and demonstrate understanding of disease process and future procedures, tests, and meds.     Patient is not progressing towards the following goals:

## 2023-10-22 NOTE — PROGRESS NOTES
Pt leaving facility facility, ambulating with all of his belongings in stable condition with wife.  W/C offered, pt declined, stating, that he has walked up and down the [address] and he feels fine.  Pt education complete with pt and wife with them both verbalizing understanding and declining having any further questions or concerns.

## 2023-10-22 NOTE — CARE PLAN
The patient is Stable - Low risk of patient condition declining or worsening    Shift Goals  Clinical Goals: monitor telemetry; monitor I&Os; NPO  Patient Goals: rest, sleep and to eat  Family Goals: DAVI    Progress made toward(s) clinical / shift goals:      Problem: Knowledge Deficit - Standard  Goal: Patient and family/care givers will demonstrate understanding of plan of care, disease process/condition, diagnostic tests and medications  Outcome: Progressing   Pt verbalizes understanding of medications being administered po and IV; diet ordered per MD as well as being NPO at midnight for procedure in the AM    Patient is not progressing towards the following goals:

## 2023-10-22 NOTE — PROGRESS NOTES
Cardiology Follow Up Progress Note    Date of Service  10/22/2023    Attending Physician  Chaparro Archer M.D.    Chief Complaint   Orthopnea and HAGAN     Cardiology consult   Afib and CHF    HPI  Harjit Pantoja is a 58 y.o. male admitted 10/16/2023 with medical history of alcohol and tobacco abuse. Presented with HAGAN and progressive orthopnea for several weeks. Noted to in afib about a week ago as outpatient, started on GDMT.    Presented with orthopnea and HAGAN, noted to be in afib with RVR. ECHO showed reduced ef 30%     Interim Events  Patient underwent failed second attempt at cardioversion, was only in normal sinus rhythm for a few seconds before converting back into atrial fib.  Patient still has some mild edema in his lower legs, denies any chest pain or issues overnight.  On telemetry he is in A-fib with rates predominantly in the 70s.    Review of Systems  Review of Systems   Constitutional:  Negative for chills, diaphoresis and fever.   HENT:  Negative for nosebleeds and trouble swallowing.    Respiratory:  Positive for shortness of breath. Negative for cough and chest tightness.    Cardiovascular:  Positive for leg swelling. Negative for chest pain and palpitations.   Gastrointestinal:  Negative for abdominal distention, abdominal pain and blood in stool.   Genitourinary:  Negative for hematuria.   Skin:  Negative for color change.   Neurological:  Negative for dizziness, syncope and numbness.   Psychiatric/Behavioral:  Negative for agitation and confusion. The patient is not nervous/anxious.        Vital signs in last 24 hours  Temp:  [36.1 °C (97 °F)-36.9 °C (98.5 °F)] 36.2 °C (97.2 °F)  Pulse:  [65-99] 74  Resp:  [16-18] 16  BP: (105-135)/(66-81) 106/75  SpO2:  [92 %-99 %] 92 %    Physical Exam  Physical Exam  Vitals and nursing note reviewed.   Constitutional:       Appearance: Normal appearance.   HENT:      Head: Normocephalic and atraumatic.   Eyes:      Pupils: Pupils are equal, round,  "and reactive to light.   Cardiovascular:      Rate and Rhythm: Tachycardia present. Rhythm irregular.      Heart sounds: Normal heart sounds. No murmur heard.  Pulmonary:      Effort: Pulmonary effort is normal.      Breath sounds: Normal breath sounds.   Abdominal:      General: Abdomen is flat.   Musculoskeletal:      Cervical back: Normal range of motion.      Right lower le+ Pitting Edema present.      Left lower le+ Pitting Edema present.   Skin:     General: Skin is warm and dry.   Neurological:      General: No focal deficit present.      Mental Status: He is alert and oriented to person, place, and time.   Psychiatric:         Mood and Affect: Mood normal.         Behavior: Behavior normal.         Thought Content: Thought content normal.         Judgment: Judgment normal.         Lab Review  Lab Results   Component Value Date/Time    WBC 7.6 10/22/2023 12:38 AM    RBC 4.48 (L) 10/22/2023 12:38 AM    HEMOGLOBIN 13.6 (L) 10/22/2023 12:38 AM    HEMATOCRIT 42.3 10/22/2023 12:38 AM    MCV 94.4 10/22/2023 12:38 AM    MCH 30.4 10/22/2023 12:38 AM    MCHC 32.2 (L) 10/22/2023 12:38 AM    MPV 11.3 10/22/2023 12:38 AM      Lab Results   Component Value Date/Time    SODIUM 143 10/22/2023 12:38 AM    POTASSIUM 3.7 10/22/2023 12:38 AM    CHLORIDE 102 10/22/2023 12:38 AM    CO2 32 10/22/2023 12:38 AM    GLUCOSE 154 (H) 10/22/2023 12:38 AM    BUN 17 10/22/2023 12:38 AM    CREATININE 1.54 (H) 10/22/2023 12:38 AM    CREATININE 1.0 2005 05:15 AM      Lab Results   Component Value Date/Time    ASTSGOT 17 10/21/2023 01:31 AM    ALTSGPT 24 10/21/2023 01:31 AM     Lab Results   Component Value Date/Time    CHOLSTRLTOT 116 2022 10:54 AM    LDL 35 2022 10:54 AM    HDL 69 2022 10:54 AM    TRIGLYCERIDE 58 2022 10:54 AM    TROPONINT 24 (H) 10/16/2023 06:55 PM       No results for input(s): \"NTPROBNP\" in the last 72 hours.      Cardiac Imaging and Procedures Review    Echocardiogram:  "   10/17/2023  Severely reduced left ventricular systolic function.  The left ventricular ejection fraction is visually estimated to be 30%.  There is evidence of elevated left ventricular filling pressures in the   setting of dysrhythmia.  Global hypokinesis most pronounced in the LV apex.  Mildly dilated right ventricle.  Moderately reduced right ventricular systolic function.  Enlarged right atrium.  Mildly dilated left atrium.  Normal estimated right atrial pressure.   Mild pulmonary hypertension, estimated PASP 40mmHg.   Small pericardial effusion without evidence of hemodynamic compromise.    Assessment/Plan  No new Assessment & Plan notes have been filed under this hospital service since the last note was generated.  Service: Cardiology    Acute decompensated heart failure ef 30%  Stage C, class 3  Atrial fibrillation new   -   Recommend 1 more dose of IV Lasix prior to discharge, patient can be transition to 40 mg of oral Lasix daily  -Has not required potassium replacement during hospitalization so will not be discharged with potassium  -  Continue losartan 50 mg daily and Toprol  mg  - continue farxiga and aldactone 25mg qd   -Has been on Cardizem 60 mg 4 times a day can be discharged on 240 mg oral extended release Cardizem  -Has been on amiodarone load during hospitalization will discharge on 200 mg daily  -  Continue oral anticoagulation with Eliquis 5mg BID     2. Hyperlipidemia   -  Continue statin therapy    3. Tobacco and alcohol abuse   - Abstinence     Patient to follow-up with cardiology as noted below:  Future Appointments   Date Time Provider Department Center   10/30/2023  1:15 PM LAQUITA Hernandez None           Thank you for allowing us to participate in the care of this patient.    Brunilda Gaffney, MSN, APRN  [facility]  [phone]    IBrunilda, A.P.R.N. performed a substantiated portion of the service face-to-face with the  same  patient on the same date of service INDEPENDENTLY OF <Dr. Sebastian> FOR 15 MINUTES. I was  personally involved in reviewing and conducting elements of the history, exam and/or  medical decision making, including the information as described above.    Please note this dictation was created using voice recognition software.  I have made every reasonable attempt to correct obvious errors, but there may be errors of grammar and possibly content that I did not discover before finalizing the note.

## 2023-10-26 ENCOUNTER — HOSPITAL ENCOUNTER (OUTPATIENT)
Dept: LAB | Facility: MEDICAL CENTER | Age: 58
End: 2023-10-26
Attending: NURSE PRACTITIONER
Payer: COMMERCIAL

## 2023-10-26 DIAGNOSIS — N18.9 CHRONIC KIDNEY DISEASE, UNSPECIFIED CKD STAGE: ICD-10-CM

## 2023-10-26 LAB
ANION GAP SERPL CALC-SCNC: 13 MMOL/L (ref 7–16)
BUN SERPL-MCNC: 33 MG/DL (ref 8–22)
CALCIUM SERPL-MCNC: 10 MG/DL (ref 8.5–10.5)
CHLORIDE SERPL-SCNC: 103 MMOL/L (ref 96–112)
CO2 SERPL-SCNC: 23 MMOL/L (ref 20–33)
CREAT SERPL-MCNC: 1.25 MG/DL (ref 0.5–1.4)
FASTING STATUS PATIENT QL REPORTED: NORMAL
GFR SERPLBLD CREATININE-BSD FMLA CKD-EPI: 67 ML/MIN/1.73 M 2
GLUCOSE SERPL-MCNC: 122 MG/DL (ref 65–99)
POTASSIUM SERPL-SCNC: 4.8 MMOL/L (ref 3.6–5.5)
SODIUM SERPL-SCNC: 139 MMOL/L (ref 135–145)

## 2023-10-26 PROCEDURE — 36415 COLL VENOUS BLD VENIPUNCTURE: CPT

## 2023-10-26 PROCEDURE — 80048 BASIC METABOLIC PNL TOTAL CA: CPT

## 2023-10-30 ENCOUNTER — OFFICE VISIT (OUTPATIENT)
Dept: CARDIOLOGY | Facility: MEDICAL CENTER | Age: 58
End: 2023-10-30
Attending: INTERNAL MEDICINE
Payer: COMMERCIAL

## 2023-10-30 ENCOUNTER — TELEPHONE (OUTPATIENT)
Dept: CARDIOLOGY | Facility: MEDICAL CENTER | Age: 58
End: 2023-10-30

## 2023-10-30 ENCOUNTER — PATIENT MESSAGE (OUTPATIENT)
Dept: CARDIOLOGY | Facility: MEDICAL CENTER | Age: 58
End: 2023-10-30

## 2023-10-30 VITALS
BODY MASS INDEX: 31.86 KG/M2 | DIASTOLIC BLOOD PRESSURE: 70 MMHG | RESPIRATION RATE: 16 BRPM | OXYGEN SATURATION: 95 % | WEIGHT: 235.2 LBS | HEART RATE: 79 BPM | HEIGHT: 72 IN | SYSTOLIC BLOOD PRESSURE: 102 MMHG

## 2023-10-30 DIAGNOSIS — I10 HTN (HYPERTENSION), MALIGNANT: ICD-10-CM

## 2023-10-30 DIAGNOSIS — R06.09 DYSPNEA ON EXERTION: ICD-10-CM

## 2023-10-30 DIAGNOSIS — I48.19 HYPERCOAGULABLE STATE DUE TO PERSISTENT ATRIAL FIBRILLATION (HCC): ICD-10-CM

## 2023-10-30 DIAGNOSIS — I48.19 PERSISTENT ATRIAL FIBRILLATION (HCC): ICD-10-CM

## 2023-10-30 DIAGNOSIS — I50.20 ACC/AHA STAGE C SYSTOLIC HEART FAILURE (HCC): ICD-10-CM

## 2023-10-30 DIAGNOSIS — Z79.899 HIGH RISK MEDICATION USE: ICD-10-CM

## 2023-10-30 DIAGNOSIS — I51.89 LEFT VENTRICULAR SYSTOLIC DYSFUNCTION, NYHA CLASS 3: ICD-10-CM

## 2023-10-30 DIAGNOSIS — D68.69 HYPERCOAGULABLE STATE DUE TO PERSISTENT ATRIAL FIBRILLATION (HCC): ICD-10-CM

## 2023-10-30 DIAGNOSIS — I42.8 NON-ISCHEMIC CARDIOMYOPATHY (HCC): ICD-10-CM

## 2023-10-30 DIAGNOSIS — E11.65 TYPE 2 DIABETES MELLITUS WITH HYPERGLYCEMIA, WITHOUT LONG-TERM CURRENT USE OF INSULIN (HCC): ICD-10-CM

## 2023-10-30 PROCEDURE — 94618 PULMONARY STRESS TESTING: CPT | Mod: 26 | Performed by: INTERNAL MEDICINE

## 2023-10-30 PROCEDURE — 3074F SYST BP LT 130 MM HG: CPT | Performed by: INTERNAL MEDICINE

## 2023-10-30 PROCEDURE — 99215 OFFICE O/P EST HI 40 MIN: CPT | Mod: 25 | Performed by: INTERNAL MEDICINE

## 2023-10-30 PROCEDURE — 99214 OFFICE O/P EST MOD 30 MIN: CPT | Performed by: INTERNAL MEDICINE

## 2023-10-30 PROCEDURE — 94618 PULMONARY STRESS TESTING: CPT | Performed by: INTERNAL MEDICINE

## 2023-10-30 PROCEDURE — 3078F DIAST BP <80 MM HG: CPT | Performed by: INTERNAL MEDICINE

## 2023-10-30 RX ORDER — SACUBITRIL AND VALSARTAN 49; 51 MG/1; MG/1
1 TABLET, FILM COATED ORAL 2 TIMES DAILY
Qty: 60 TABLET | Refills: 11 | Status: SHIPPED | OUTPATIENT
Start: 2023-10-30 | End: 2023-11-07 | Stop reason: SDUPTHER

## 2023-10-30 RX ORDER — METOPROLOL SUCCINATE 200 MG/1
200 TABLET, EXTENDED RELEASE ORAL DAILY
Qty: 100 TABLET | Refills: 4 | Status: SHIPPED | OUTPATIENT
Start: 2023-10-30 | End: 2023-11-07 | Stop reason: SDUPTHER

## 2023-10-30 ASSESSMENT — MINNESOTA LIVING WITH HEART FAILURE QUESTIONNAIRE (MLHF)
WORKING AROUND THE HOUSE OR YARD DIFFICULT: 4
DIFFICULTY WORKING TO EARN A LIVING: 3
MAKING YOU SHORT OF BREATH: 4
SWELLING IN ANKLES OR LEGS: 5
DIFFICULTY WITH RECREATIONAL PASTIMES, SPORTS, HOBBIES: 2
TOTAL_SCORE: 70
DIFFICULTY WITH SEXUAL ACTIVITIES: 3
TIRED, FATIGUED OR LOW ON ENERGY: 4
DIFFICULTY GOING AWAY FROM HOME: 3
DIFFICULTY SLEEPING WELL AT NIGHT: 5
EATING LESS FOODS YOU LIKE: 4
MAKING YOU FEEL DEPRESSED: 4
COSTING YOU MONEY FOR MEDICAL CARE: 0
MAKING YOU STAY IN A HOSPITAL: 5
GIVING YOU SIDE EFFECTS FROM TREATMENTS: 2
FEELING LIKE A BURDEN TO FAMILY AND FRIENDS: 3
DIFFICULTY TO CONCENTRATE OR REMEMBERING THINGS: 0
WALKING ABOUT OR CLIMBING STAIRS DIFFICULT: 4
LOSS OF SELF CONTROL IN YOUR LIFE: 4
HAVING TO SIT OR LIE DOWN DURING THE DAY: 4
MAKING YOU WORRY: 4
DIFFICULTY SOCIALIZING WITH FAMILY OR FRIENDS: 3

## 2023-10-30 ASSESSMENT — ENCOUNTER SYMPTOMS
COUGH: 0
DEPRESSION: 0
BLURRED VISION: 0
FALLS: 0
BRUISES/BLEEDS EASILY: 0
DIZZINESS: 0
MEMORY LOSS: 0
DIAPHORESIS: 0
SHORTNESS OF BREATH: 1
ABDOMINAL PAIN: 0
SENSORY CHANGE: 0
HEADACHES: 0
FEVER: 0
PALPITATIONS: 0
MYALGIAS: 0
DOUBLE VISION: 0

## 2023-10-30 ASSESSMENT — FIBROSIS 4 INDEX: FIB4 SCORE: 1.19

## 2023-10-30 ASSESSMENT — 6 MINUTE WALK TEST (6MWT): TOTAL DISTANCE WALKED (METERS): 365.76

## 2023-10-30 NOTE — PATIENT INSTRUCTIONS
Will stop Cardizem and will increase Toprol to 200 mg daily.    Will stop Losartan and will start Entresto 49/51 mg twice a day.

## 2023-10-30 NOTE — PROGRESS NOTES
Chief Complaint   Patient presents with    Congestive Heart Failure     F/V DX: Congestive heart  Failure NEW        Subjective     Harjit Pantoja is a 58 y.o. male who presents today for cardiac care and evaluation in the heart failure clinic because of recent hospitalization due to heart failure exacerbation. Patient did have a coronary angiogram, which showed non-obstructive coronary arterial disease. The diagnosis of HF was made in 10/2023. It was presumed to be non-ischemic in natured due to ETOH abuse, new onset AF. The left ventricular systolic function was documented to be at 30%. Patient was diuresed in the hospital and discharged home with guidelines directed medical therapy. He also failed FILIBERTO cardioversion while in the hospital.    Patient still gets winded with daily living activities and exertion. No symptoms at rest.    I have independently interpreted and reviewed blood tests results with patient in clinic which shows GFR of 67, NT pro BNP of 6003, K of 4.8.    Patient was able to complete 365 m during his 6 minute walk test. His O2 saturation at baseline was 95% and at the end of the test, the O2 saturation was 95%. he reported 1 level of dyspnea on Jorden scale.      Past Medical History:   Diagnosis Date    Cold 12/7/11    stomach flu 12/3/11    Snoring      Past Surgical History:   Procedure Laterality Date    VENTRAL HERNIA REPAIR LAPAROSCOPIC  12/14/2011    Performed by TUYET RIDLEY at SURGERY [facility]    OTHER ABDOMINAL SURGERY      repair of diverticulitis; colon resection     History reviewed. No pertinent family history.  Social History     Socioeconomic History    Marital status:      Spouse name: Not on file    Number of children: Not on file    Years of education: Not on file    Highest education level: Not on file   Occupational History    Not on file   Tobacco Use    Smoking status: Former     Current packs/day: 1.00     Average packs/day: 1 pack/day for 10.0 years  (10.0 ttl pk-yrs)     Types: Cigarettes    Smokeless tobacco: Current   Vaping Use    Vaping Use: Former    Quit date: 12/2/2019   Substance and Sexual Activity    Alcohol use: Not Currently     Alcohol/week: 2.4 oz     Types: 4 Standard drinks or equivalent per week    Drug use: Yes     Types: Marijuana, Oral     Comment: one every two weeks.     Sexual activity: Not on file   Other Topics Concern    Not on file   Social History Narrative    Not on file     Social Determinants of Health     Financial Resource Strain: Not on file   Food Insecurity: Not on file   Transportation Needs: Not on file   Physical Activity: Not on file   Stress: Not on file   Social Connections: Not on file   Intimate Partner Violence: Not on file   Housing Stability: Not on file     Allergies   Allergen Reactions    Cleocin [Clindamycin Hcl] Hives    Pcn [Penicillins] Hives     Outpatient Encounter Medications as of 10/30/2023   Medication Sig Dispense Refill    sacubitril-valsartan (ENTRESTO) 49-51 MG Tab Take 1 Tablet by mouth 2 times a day. 60 Tablet 11    metoprolol (TOPROL XL) 200 MG XL tablet Take 1 Tablet by mouth every day. 100 Tablet 4    amiodarone (CORDARONE) 200 MG Tab Take 1 Tablet by mouth every day. 30 Tablet 11    dapagliflozin propanediol (FARXIGA) 10 MG Tab Take 1 Tablet by mouth every day. 30 Tablet 0    spironolactone (ALDACTONE) 25 MG Tab Take 1 Tablet by mouth every day. 30 Tablet 3    furosemide (LASIX) 40 MG Tab Take 1 Tablet by mouth every day. 30 Tablet 0    metFORMIN ER (GLUCOPHAGE XR) 500 MG TABLET SR 24 HR Take 1,000 mg by mouth 2 times a day. 1,000 mg = 2 tabs      rosuvastatin (CRESTOR) 10 MG Tab Take 10 mg by mouth every day.      apixaban (ELIQUIS) 5mg Tab Take 5 mg by mouth 2 times a day.      Probiotic Product (PROBIOTIC PO) Take 1 Tablet by mouth every day.      [DISCONTINUED] dilTIAZem CD (CARDIZEM CD) 240 MG CAPSULE SR 24 HR Take 1 Capsule by mouth every day. 30 Capsule 11    [DISCONTINUED] losartan  "(COZAAR) 50 MG Tab Take 1 Tablet by mouth every day. 30 Tablet 0    [DISCONTINUED] metoprolol SR (TOPROL XL) 100 MG TABLET SR 24 HR Take 1 Tablet by mouth every day. 30 Tablet 0     No facility-administered encounter medications on file as of 10/30/2023.     Review of Systems   Constitutional:  Negative for diaphoresis and fever.   HENT:  Negative for nosebleeds.    Eyes:  Negative for blurred vision and double vision.   Respiratory:  Positive for shortness of breath. Negative for cough.    Cardiovascular:  Negative for chest pain and palpitations.   Gastrointestinal:  Negative for abdominal pain.   Genitourinary:  Negative for dysuria and frequency.   Musculoskeletal:  Negative for falls and myalgias.   Skin:  Negative for rash.   Neurological:  Negative for dizziness, sensory change and headaches.   Endo/Heme/Allergies:  Does not bruise/bleed easily.   Psychiatric/Behavioral:  Negative for depression and memory loss.               Objective     /70 (BP Location: Left arm, Patient Position: Sitting, BP Cuff Size: Adult)   Pulse 79   Resp 16   Ht 1.829 m (6' 0.01\")   Wt 107 kg (235 lb 3.2 oz)   SpO2 95%   BMI 31.89 kg/m²     Physical Exam  Vitals and nursing note reviewed.   Constitutional:       General: He is not in acute distress.     Appearance: He is not diaphoretic.   HENT:      Head: Normocephalic and atraumatic.      Right Ear: External ear normal.      Left Ear: External ear normal.      Nose: No congestion or rhinorrhea.   Eyes:      General:         Right eye: No discharge.         Left eye: No discharge.   Neck:      Thyroid: No thyromegaly.      Vascular: No JVD.   Cardiovascular:      Rate and Rhythm: Normal rate. Rhythm irregular.      Pulses: Normal pulses.   Pulmonary:      Effort: No respiratory distress.   Abdominal:      General: There is no distension.      Tenderness: There is no abdominal tenderness.   Musculoskeletal:         General: No swelling or tenderness.      Right lower " leg: No edema.      Left lower leg: No edema.   Skin:     General: Skin is warm and dry.   Neurological:      Mental Status: He is alert and oriented to person, place, and time.      Cranial Nerves: No cranial nerve deficit.   Psychiatric:         Behavior: Behavior normal.                Assessment & Plan     1. ACC/AHA stage C systolic heart failure (HCC)  sacubitril-valsartan (ENTRESTO) 49-51 MG Tab    metoprolol (TOPROL XL) 200 MG XL tablet    proBrain Natriuretic Peptide, NT    REFERRAL TO CARDIOLOGY - HEART FAILURE NURSING EDUCATION    Referral to Pharmacotherapy Service      2. Left ventricular systolic dysfunction, NYHA class 3  sacubitril-valsartan (ENTRESTO) 49-51 MG Tab    metoprolol (TOPROL XL) 200 MG XL tablet    proBrain Natriuretic Peptide, NT    REFERRAL TO CARDIOLOGY - HEART FAILURE NURSING EDUCATION    Referral to Pharmacotherapy Service      3. Non-ischemic cardiomyopathy (HCC)        4. Persistent atrial fibrillation (HCC)  metoprolol (TOPROL XL) 200 MG XL tablet    REFERRAL TO CARDIOLOGY      5. Hypercoagulable state due to persistent atrial fibrillation (HCC)        6. Dyspnea on exertion  sacubitril-valsartan (ENTRESTO) 49-51 MG Tab    metoprolol (TOPROL XL) 200 MG XL tablet    proBrain Natriuretic Peptide, NT    REFERRAL TO CARDIOLOGY - HEART FAILURE NURSING EDUCATION    Referral to Pharmacotherapy Service      7. High risk medication use  Basic Metabolic Panel    Referral to Pharmacotherapy Service      8. HTN (hypertension), malignant  Basic Metabolic Panel      9. Type 2 diabetes mellitus with hyperglycemia, without long-term current use of insulin (HCC)  Basic Metabolic Panel          Medical Decision Making: Today's Assessment/Status/Plan:   Non-Ischemic Cardiomyopathy:  Chronically illed condition which requires ongoing close monitoring and treatment to improve survival rate along with decreasing risk of clinical decompensation sudden cardiac death and hospitalization.    Today, based  on physical examination findings, patient is euvolemic. No JVD, lungs are clear to auscultation, no pitting edema in bilateral lower extremities, no ascites.     Dry weight is 235 lbs.    Based on the overall clinical history and profile, patient is a good candidate for Entresto therapy (with superiority over ACE-I therapy in terms of survival benefits and prevention of future hospitalization).  Will start Entresto therapy at 49/51 mg po bid.  Patient was given written instruction about the protocol of initiating Entresto therapy with wash out period.     Will stop Cardizem and will increase Toprol to 200 mg daily.     Aldactone antagonist with Spironolactone / Eplerenone 25 mg daily.    We will consider ICD placement for primary prevention in 3 months if left ventricular systolic function remains less than 35% after optimization of evidence based heart failure medical regimen.    Will need to stop drinking ETOH all together.    Persistent atrial fibrillation:  Anticoagulation with Eliquis 5  mg 2x daily.  At this time, I do think that patient would be benefited from sinus restoration given his young age along with cardiomyopathy.  Therefore, I will refer patient to electrophysiology for further evaluation for potential ablation procedure for more permanent treatment of his atrial fibrillation.  Patient did have failed cardioversion while he was in the hospital.  In the meantime, we will continue amiodarone therapy.    Hypertension:  Optimize control using cardiomyopathy medical regimen as well.    Hyperlipidemia:  Optimize statin as within guidelines of CAD treatment as above.     Of note, during the care of this patient, I spent a significant amount of time explaining the nature of the disease process, reviewing all possible imaging studies, blood test results to patient.  The overall care of this patient require a higher level of care than usual due to the multiple comorbidities along with ongoing issues of  congestive heart failure that put this patient at risk for sudden cardiac death, increased mortality, and hospitalization.  This patient also requires close monitoring with at least monthly blood test to monitor renal function and electrolytes due to the ongoing dynamic changes of medical therapy titration protocol to ensure optimal benefits for the overall care of this patient.    Ira Bacon M.D.

## 2023-10-30 NOTE — TELEPHONE ENCOUNTER
Medication: ENTRESTO 49-51 MG Tab    Type of Insurance: Commercial  Type of Financial assistance requested Copay Card  Source: "[employer]" website  Source Phone #: [phone]  Outcome: approved  Effective dates: 10/30/23 until 10/30/24  Details/Billing Information:   [id number]  PCN: [id number]  GRP: [id number]  ID: [id number]  Max Award Amount: $4100  Final Copay: $10.00

## 2023-10-31 ENCOUNTER — TELEPHONE (OUTPATIENT)
Dept: VASCULAR LAB | Facility: MEDICAL CENTER | Age: 58
End: 2023-10-31
Payer: COMMERCIAL

## 2023-10-31 NOTE — TELEPHONE ENCOUNTER
[facility]     Received heart failure referral from Dr. Bacon on 10/30/23.     Called and spoke to patient. Initial visit scheduled on 11/07/23 at 9:30 am.    1st call     Insurance: [employer]  PCP: External  Locations to be seen:  [facility]    If no response by 11/30/23 OR 2 no shows/cancellations, will remove from referral list     [facility] at [phone], fax [phone]    Deedee Khanna, ImeldaD

## 2023-10-31 NOTE — TELEPHONE ENCOUNTER
Caller:  -Alyse (spouse)    Topic/issue:  - Aundrea from the [facility], is sending over the information for the refill for Entresto    Callback Number: [phone]    Thank you,   Dulce DYER

## 2023-11-01 ENCOUNTER — TELEPHONE (OUTPATIENT)
Dept: CARDIOLOGY | Facility: MEDICAL CENTER | Age: 58
End: 2023-11-01
Payer: COMMERCIAL

## 2023-11-01 NOTE — TELEPHONE ENCOUNTER
TT    Caller: Lashell    Topic/issue: Checking on the status of the Entresto. Would like to know when it will be sent to the [facility]. They thought it would have been there on 10/30/2023.     Callback Number: [phone] (home) [phone] (work)     Thank You    Stefani MARTÍNEZ

## 2023-11-03 NOTE — PROGRESS NOTES
"CC: \"sleep apnea\"    HPI:  Harjit Pantoja is a 58 y.o. owner/president of  [employer] kindly referred by Du Mullen M.D. He briefly describes his sleep problem as \"started snoring heavily about 20 years ago. Now I don't snore as much but my wife says I have long pauses between my breathing.\"    He rates the severity as 5/10. Has used melatonin to assist with sleep onset and maintenance. Works from 6:30 AM to 4:30 PM.  Usually goes to bed at 8:30 in weekdays and on weekends.  He has a regular bed partner.  He estimates that it may take him 1 to 2 hours to fall asleep.  He may watch TV just prior to turning out the lights and attempting to go to sleep.  He reports 3-4 nocturnal awakenings and 3-4 episodes of nocturia    Symptoms include tiredness during the day which he relates to stress.  He had a recent hospitalization and prior to this was quite short of breath at night but that has improved now.  His snoring is not as loud now as when he was heavier.  He denies any restless legs or teeth grinding.  He denies arms and legs twitching and jerking at night.    He is a former smoker.  He consumes 4 standard drinks or equivalent per week.  He may occasionally use oral marijuana every 1 to 2 weeks.    He was hospitalized in October with dyspnea on exertion, orthopnea, atrial fibrillation, and a reduced LVEF of 30%.  A cardioversion at the bedside was unsuccessful.  Current medications include sacubitril-valsartan.,  Toprol XL, amiodarone, spironolactone, furosemide, metformin, Crestor, Eliquis, and Farxiga.    Past medical history significant for mild pulmonary hypertension with an RVSP of 40 mmHg, decreased LVEF of 15-30%, atrial fibrillation, unsuccessful cardioversion, pending electrophysiologic evaluation for ablation, chronic anticoagulation, former smoker, alcohol use, BPH, type 2 diabetes, diabetic nephritis, elevated liver enzymes, fatigue, glucose intolerance, dyslipidemia, former " smoker, obesity, and insomnia.                  Patient Active Problem List    Diagnosis Date Noted    Heart failure (HCC) 10/17/2023    Type 2 diabetes mellitus (HCC) 10/17/2023    Has recently quit alcohol use 10/16/2023    A-fib (HCC) 10/16/2023       Past Medical History:   Diagnosis Date    Apnea, sleep     Atrial fibrillation (HCC)     Bronchitis     Chickenpox     Cold 2011    stomach flu 12/3/11    Diabetes (HCC)     Difficulty breathing     Hearing difficulty     Hyperlipidemia     Hypertension     Influenza     Shortness of breath     Snoring     Swelling of lower extremity     Wears glasses         Past Surgical History:   Procedure Laterality Date    VENTRAL HERNIA REPAIR LAPAROSCOPIC  2011    Performed by TUYET RIDLEY at SURGERY [facility]    COLON RESECTION      OTHER ABDOMINAL SURGERY      repair of diverticulitis; colon resection       Family History   Problem Relation Age of Onset    Lung Cancer Mother         Diagnosed with lung cancer this past summer    Prostate cancer Father         Passed away prostate cancer and heart problems       Social History     Socioeconomic History    Marital status:      Spouse name: Not on file    Number of children: Not on file    Years of education: Not on file    Highest education level: Not on file   Occupational History    Not on file   Tobacco Use    Smoking status: Some Days     Current packs/day: 0.00     Average packs/day: 1 pack/day for 32.0 years (32.0 ttl pk-yrs)     Types: Cigarettes     Start date: 2001     Last attempt to quit: 10/22/2023     Years since quittin.0    Smokeless tobacco: Current    Tobacco comments:     Smoked a pack of day since 36. Quit three weeks ago and have had a few cigarettes the last few days   Vaping Use    Vaping Use: Former    Quit date: 2019   Substance and Sexual Activity    Alcohol use: Not Currently     Alcohol/week: 2.4 oz     Types: 4 Standard drinks or equivalent per week      "Comment: Was an every night heavy beer drinker until a month ago.    Drug use: Not Currently     Types: Marijuana, Oral     Comment: one every two weeks.     Sexual activity: Not on file   Other Topics Concern    Not on file   Social History Narrative    Not on file     Social Determinants of Health     Financial Resource Strain: Not on file   Food Insecurity: Not on file   Transportation Needs: Not on file   Physical Activity: Not on file   Stress: Not on file   Social Connections: Not on file   Intimate Partner Violence: Not on file   Housing Stability: Not on file       Current Outpatient Medications   Medication Sig Dispense Refill    zolpidem (AMBIEN) 5 MG Tab Take 1-2 Tablets by mouth at bedtime as needed for Sleep (Take 1-2 tabs on the night of the sleep study as needed) for up to 1 day. Take 1-2 tabs on the night of the sleep study as needed 2 Tablet 0    sacubitril-valsartan (ENTRESTO) 49-51 MG Tab Take 1 Tablet by mouth 2 times a day. 60 Tablet 11    metoprolol (TOPROL XL) 200 MG XL tablet Take 1 Tablet by mouth every day. 100 Tablet 4    amiodarone (CORDARONE) 200 MG Tab Take 1 Tablet by mouth every day. 30 Tablet 11    dapagliflozin propanediol (FARXIGA) 10 MG Tab Take 1 Tablet by mouth every day. 30 Tablet 0    spironolactone (ALDACTONE) 25 MG Tab Take 1 Tablet by mouth every day. 30 Tablet 3    furosemide (LASIX) 40 MG Tab Take 1 Tablet by mouth every day. 30 Tablet 0    metFORMIN ER (GLUCOPHAGE XR) 500 MG TABLET SR 24 HR Take 1,000 mg by mouth 2 times a day. 1,000 mg = 2 tabs      rosuvastatin (CRESTOR) 10 MG Tab Take 10 mg by mouth every day.      apixaban (ELIQUIS) 5mg Tab Take 5 mg by mouth 2 times a day.      Probiotic Product (PROBIOTIC PO) Take 1 Tablet by mouth every day.       No current facility-administered medications for this visit.    \"CURRENT RX\"    ALLERGIES: Cleocin [clindamycin hcl] and Pcn [penicillins]    ROS    Constitutional: Denies fever, chills, sweats,  weight loss, " fatigue.  Eyes: Denies vision loss, pain, drainage, double vision, glasses.  Ears/Nose/Mouth/Throat: Denies earache, difficulty hearing, rhinitis/nasal congestion, injury, recurrent sore throat, persistent hoarseness, decayed teeth/toothaches, ringing or buzzing in the ears.  Cardiovascular: Denies chest pain, tightness, palpitations, swelling in legs/feet, fainting, difficulty breathing when lying down but gets better when sitting up.   Respiratory: Denies shortness of breath, cough, sputum, wheezing, painful breathing, coughing up blood.   Sleep: per HPI  Gastrointestinal: Denies  difficulty swallowing, nausea, abdominal pain, diarrhea, constipation, heartburn.  Genitourinary: Denies  blood in urine, discharge, frequent urination.   Musculoskeletal: Denies painful joints, sore muscles, back pain.   Integumentary: Denies rashes, lumps, color changes.   Neurological: Denies frequent headaches,weakness, dizziness.    PHYSICAL EXAM      /84 (BP Location: Left arm, Patient Position: Sitting, BP Cuff Size: Adult)   Pulse 60   Resp 16   Ht 1.829 m (6')   Wt 103 kg (227 lb)   SpO2 93%   BMI 30.79 kg/m²   Appearance: Well-nourished, well-developed, no acute distress  Eyes:  PERRLA, EOMI  ENMT: Mallampati 1; elongated uvula  Neck: Supple, trachea midline, no masses  Respiratory effort:  No intercostal retractions or use of accessory muscles  Lung auscultation:  No wheezes rhonchi rubs or rales  Cardiac: No murmurs, rubs, or gallops; regular rhythm, normal rate; no edema  Abdomen:  No tenderness, no organomegaly  Musculoskeletal:  Grossly normal; gait and station normal; digits and nails normal  Skin:  No rashes, petechiae, cyanosis  Neurologic: without focal signs; oriented to person, time, place, and purpose; judgement intact  Psychiatric:  No depression, anxiety, agitation        Medical Decision Making:  The medical record was reviewed in its entirety including the referral notes, records from primary care,  consultants notes, hospital records, labs, imaging, microbiology, immunology, and immunizations.  Care gaps identified and reviewed with the patient.    Diagnostic and titration nocturnal polysomnograms, home sleep apnea tests, continuous nocturnal oximetry results, multiple sleep latency tests, and compliance reports reviewed.        1. Snoring  - zolpidem (AMBIEN) 5 MG Tab; Take 1-2 Tablets by mouth at bedtime as needed for Sleep (Take 1-2 tabs on the night of the sleep study as needed) for up to 1 day. Take 1-2 tabs on the night of the sleep study as needed  Dispense: 2 Tablet; Refill: 0  - Polysomnography, 4 or More; Future    2. JENNIFER (obstructive sleep apnea)  - zolpidem (AMBIEN) 5 MG Tab; Take 1-2 Tablets by mouth at bedtime as needed for Sleep (Take 1-2 tabs on the night of the sleep study as needed) for up to 1 day. Take 1-2 tabs on the night of the sleep study as needed  Dispense: 2 Tablet; Refill: 0  - Polysomnography, 4 or More; Future      PLAN:   The patient has signs and symptoms consistent with obstructive sleep apnea hypopnea syndrome.  Given his significant comorbidities, recommend an in lab attended nocturnal polysomnogram.    The risks of untreated sleep apnea were discussed with the patient at length. Patients with JENNIFER are at increased risk of cardiovascular disease including systemic arterial hypertension, pulmonary arterial hypertension (transient or fixed), TIA, and an elevated risk of stroke, heart attack, sudden death, or arrhythmia between the hours of 11 PM and 6 AM. JENNIFER patients have an increased risk of motor vehicle accidents, type 2 diabetes, GERD, repetitive mechanical trauma to pharyngeal muscles with inflammation and denervation, frequent EEG arousals leading to nonrestorative sleep, excessive daytime sleepiness, fatigue, depression, poor pain control, irritability, and lower quality of life.  The patient was advised to avoid driving a motor vehicle when drowsy.    Positive airway  pressure will favorably impact many of the adverse conditions and effects provoked by JENNIFER.    Have advised the patient to follow up with the appropriate healthcare practitioners for all other medical problems and issues. Discussed blood pressure management if needed. Discussed depression screening.            Return for after sleep study.    Total time 45 minutes

## 2023-11-06 ENCOUNTER — OFFICE VISIT (OUTPATIENT)
Dept: SLEEP MEDICINE | Facility: MEDICAL CENTER | Age: 58
End: 2023-11-06
Attending: BEHAVIOR ANALYST
Payer: COMMERCIAL

## 2023-11-06 VITALS
HEART RATE: 60 BPM | RESPIRATION RATE: 16 BRPM | WEIGHT: 227 LBS | OXYGEN SATURATION: 93 % | BODY MASS INDEX: 30.75 KG/M2 | SYSTOLIC BLOOD PRESSURE: 124 MMHG | HEIGHT: 72 IN | DIASTOLIC BLOOD PRESSURE: 84 MMHG

## 2023-11-06 DIAGNOSIS — R06.83 SNORING: ICD-10-CM

## 2023-11-06 DIAGNOSIS — G47.33 OSA (OBSTRUCTIVE SLEEP APNEA): ICD-10-CM

## 2023-11-06 PROCEDURE — 99212 OFFICE O/P EST SF 10 MIN: CPT | Performed by: INTERNAL MEDICINE

## 2023-11-06 PROCEDURE — 3074F SYST BP LT 130 MM HG: CPT | Performed by: INTERNAL MEDICINE

## 2023-11-06 PROCEDURE — 3079F DIAST BP 80-89 MM HG: CPT | Performed by: INTERNAL MEDICINE

## 2023-11-06 PROCEDURE — 99204 OFFICE O/P NEW MOD 45 MIN: CPT | Performed by: INTERNAL MEDICINE

## 2023-11-06 RX ORDER — ZOLPIDEM TARTRATE 5 MG/1
5-10 TABLET ORAL NIGHTLY PRN
Qty: 2 TABLET | Refills: 0 | Status: SHIPPED | OUTPATIENT
Start: 2023-11-06 | End: 2023-11-07

## 2023-11-06 ASSESSMENT — FIBROSIS 4 INDEX: FIB4 SCORE: 1.19

## 2023-11-07 ENCOUNTER — NON-PROVIDER VISIT (OUTPATIENT)
Dept: CARDIOLOGY | Facility: MEDICAL CENTER | Age: 58
End: 2023-11-07
Attending: INTERNAL MEDICINE
Payer: COMMERCIAL

## 2023-11-07 VITALS
WEIGHT: 223 LBS | HEART RATE: 61 BPM | BODY MASS INDEX: 30.24 KG/M2 | DIASTOLIC BLOOD PRESSURE: 54 MMHG | SYSTOLIC BLOOD PRESSURE: 99 MMHG

## 2023-11-07 VITALS
RESPIRATION RATE: 14 BRPM | HEIGHT: 72 IN | HEART RATE: 59 BPM | OXYGEN SATURATION: 98 % | WEIGHT: 228 LBS | SYSTOLIC BLOOD PRESSURE: 114 MMHG | BODY MASS INDEX: 30.88 KG/M2 | DIASTOLIC BLOOD PRESSURE: 64 MMHG

## 2023-11-07 DIAGNOSIS — Z71.89 ENCOUNTER FOR EDUCATION ABOUT HEART FAILURE: ICD-10-CM

## 2023-11-07 DIAGNOSIS — I48.19 PERSISTENT ATRIAL FIBRILLATION (HCC): ICD-10-CM

## 2023-11-07 DIAGNOSIS — I51.89 LEFT VENTRICULAR SYSTOLIC DYSFUNCTION, NYHA CLASS 3: ICD-10-CM

## 2023-11-07 DIAGNOSIS — R06.09 DYSPNEA ON EXERTION: ICD-10-CM

## 2023-11-07 DIAGNOSIS — I50.20 ACC/AHA STAGE C SYSTOLIC HEART FAILURE (HCC): ICD-10-CM

## 2023-11-07 PROCEDURE — 99213 OFFICE O/P EST LOW 20 MIN: CPT

## 2023-11-07 PROCEDURE — 99212 OFFICE O/P EST SF 10 MIN: CPT | Performed by: INTERNAL MEDICINE

## 2023-11-07 PROCEDURE — 3078F DIAST BP <80 MM HG: CPT | Performed by: INTERNAL MEDICINE

## 2023-11-07 PROCEDURE — 99205 OFFICE O/P NEW HI 60 MIN: CPT | Performed by: INTERNAL MEDICINE

## 2023-11-07 PROCEDURE — 3074F SYST BP LT 130 MM HG: CPT | Performed by: INTERNAL MEDICINE

## 2023-11-07 PROCEDURE — 93005 ELECTROCARDIOGRAM TRACING: CPT | Performed by: INTERNAL MEDICINE

## 2023-11-07 RX ORDER — DAPAGLIFLOZIN 10 MG/1
10 TABLET, FILM COATED ORAL DAILY
Qty: 90 TABLET | Refills: 1 | Status: SHIPPED | OUTPATIENT
Start: 2023-11-07 | End: 2024-02-05 | Stop reason: SDUPTHER

## 2023-11-07 RX ORDER — METOPROLOL SUCCINATE 200 MG/1
200 TABLET, EXTENDED RELEASE ORAL DAILY
Qty: 90 TABLET | Refills: 1 | Status: SHIPPED | OUTPATIENT
Start: 2023-11-07 | End: 2024-02-05 | Stop reason: SDUPTHER

## 2023-11-07 RX ORDER — FUROSEMIDE 40 MG/1
40 TABLET ORAL DAILY
Qty: 90 TABLET | Refills: 1 | Status: SHIPPED | OUTPATIENT
Start: 2023-11-07 | End: 2024-02-06 | Stop reason: SDUPTHER

## 2023-11-07 RX ORDER — AMIODARONE HYDROCHLORIDE 200 MG/1
200 TABLET ORAL DAILY
Qty: 30 TABLET | Refills: 11 | Status: SHIPPED | OUTPATIENT
Start: 2023-11-07 | End: 2024-02-05

## 2023-11-07 RX ORDER — SPIRONOLACTONE 25 MG/1
25 TABLET ORAL DAILY
Qty: 90 TABLET | Refills: 1 | Status: SHIPPED | OUTPATIENT
Start: 2023-11-07 | End: 2024-02-05 | Stop reason: SDUPTHER

## 2023-11-07 RX ORDER — SACUBITRIL AND VALSARTAN 49; 51 MG/1; MG/1
1 TABLET, FILM COATED ORAL 2 TIMES DAILY
Qty: 180 TABLET | Refills: 1 | Status: SHIPPED | OUTPATIENT
Start: 2023-11-07 | End: 2023-12-11

## 2023-11-07 ASSESSMENT — FIBROSIS 4 INDEX
FIB4 SCORE: 1.19
FIB4 SCORE: 1.19

## 2023-11-07 NOTE — PROGRESS NOTES
Pt and spouse arrived for HF education.     Discussed in great detail dysfunction of heart muscle based on description of systolic versus diastolic versus right-sided.     Provided possible etiology based on patient chart review.    Medication purpose and function in body discussed with great detail. Described acclamation period with new medications and titrations.     Reinforced proper home weight and blood pressure monitoring to include BP 2 hours after medication. Tips provided for symptomatic low blood pressure (8 oz of water, small salty snack, laying down with feet elevated). Provided warning signs of orthostatic hypotension.     Educated on appropriate water versus total fluid intake/restriction as well as how to properly read nutrition levels for salt intake monitoring. Provided visual and verbal understanding of salt versus water intake in our bodies and how it affects HF. Additionally provided detail information of how salt is in all foods naturally at some capacity    Discussed physical activity recommendations including starting slow with gradual increases as improvement occurs. Avoid excessive cardio with high increased HR and blood pressure for extended periods of time. Patient should be able to recover and back to baseline within 30 min.    All questions answered, total time spent with patient 45 min.

## 2023-11-07 NOTE — PROGRESS NOTES
"CHF Pharmacotherapy visit:    Harjit Pantoja  1965    Informed written consent was given on: 23      HPI  Harjit Pantoja is here for CHF   Etiology of heart failure if known, per cardiology: possible Afib/ETOH  Pertinent Interval History since last visit:   New pt appt   Most recent EF:   10/17/23 11:04 10/19/23 15:22   Left Ventrical Ejection Fraction 30 15       There is no immunization history for the selected administration types on file for this patient.    SOCIAL HISTORY  Social History     Tobacco Use   Smoking Status Some Days    Current packs/day: 0.00    Average packs/day: 1 pack/day for 32.0 years (32.0 ttl pk-yrs)    Types: Cigarettes    Start date: 2001    Last attempt to quit: 10/22/2023    Years since quittin.0   Smokeless Tobacco Current   Tobacco Comments    Smoked a pack of day since 36. Quit three weeks ago and have had a few cigarettes the last few days        Recent Imaging Studies:    None since last visit    DATA REVIEW  INR   Date Value Ref Range Status   2005 1.13 0.86 - 1.14 Final     No results found for: \"POCINR\"   Lab Results   Component Value Date/Time    HBA1C 6.4 (H) 10/17/2023 02:24 PM          Lab Results   Component Value Date/Time    CHOLSTRLTOT 116 2022 10:54 AM    LDL 35 2022 10:54 AM    HDL 69 2022 10:54 AM    TRIGLYCERIDE 58 2022 10:54 AM       Lab Results   Component Value Date/Time    SODIUM 139 10/26/2023 10:45 AM    POTASSIUM 4.8 10/26/2023 10:45 AM    CHLORIDE 103 10/26/2023 10:45 AM    CO2 23 10/26/2023 10:45 AM    GLUCOSE 122 (H) 10/26/2023 10:45 AM    BUN 33 (H) 10/26/2023 10:45 AM    CREATININE 1.25 10/26/2023 10:45 AM    CREATININE 1.0 2005 05:15 AM     Lab Results   Component Value Date/Time    ALKPHOSPHAT 46 10/21/2023 01:31 AM    ASTSGOT 17 10/21/2023 01:31 AM    ALTSGPT 24 10/21/2023 01:31 AM    TBILIRUBIN 0.4 10/21/2023 01:31 AM    INR 1.13 2005 04:00 AM    ALBUMIN 3.6 10/21/2023 01:31 AM " "     Lab Results   Component Value Date/Time    RBC 4.48 (L) 10/22/2023 12:38 AM    HEMOGLOBIN 13.6 (L) 10/22/2023 12:38 AM    HEMATOCRIT 42.3 10/22/2023 12:38 AM    MCV 94.4 10/22/2023 12:38 AM    MCH 30.4 10/22/2023 12:38 AM    MCHC 32.2 (L) 10/22/2023 12:38 AM    MPV 11.3 10/22/2023 12:38 AM      No results found for: \"MALBCRT\", \"MICROALBUR\"    Significant changes to laboratory values since last visit that require repeat labs:  N/a  Other Pertinent Blood Work:   N/a    Current Outpatient Medications:     zolpidem, 5-10 mg, Oral, HS PRN    Entresto, 1 Tablet, Oral, BID    metoprolol, 200 mg, Oral, DAILY    amiodarone, 200 mg, Oral, DAILY    dapagliflozin propanediol, 10 mg, Oral, DAILY    spironolactone, 25 mg, Oral, DAILY    furosemide, 40 mg, Oral, DAILY    metFORMIN ER, 1,000 mg, Oral, BID    rosuvastatin, 10 mg, Oral, DAILY    apixaban, 5 mg, Oral, BID    Probiotic Product (PROBIOTIC PO), 1 Tablet, Oral, QDAY    There were no vitals filed for this visit.    ASSESSMENT AND PLAN    CHF & HTN:  PUMP line number [id number] (PUMP) reviewed with patient  Is blood pressure at less <130/80 in clinic today: y  Home BP and HR:  n/a, HR n/a  He is not checking at home, but will start  Lower extremity edema present: no  Shortness of breath at rest: no  HAGAN, dyspnea on exertion: no  Orthopnea: no  PND, paroxysmal nocturnal dyspnea:  no  Change in weight: lost 40lb since hospital visit   Exercise habits: minimal exercise   Current Adherence to CHF and other therapies: Complete  Kidney function:   Latest Reference Range & Units 10/21/23 01:31 10/22/23 00:38 10/26/23 10:45   Creatinine 0.50 - 1.40 mg/dL 1.38 1.54 (H) 1.25   GFR (CKD-EPI) >60 mL/min/1.73 m 2 59 ! 52 ! 67   (H): Data is abnormally high  !: Data is abnormal    CHF medications:  Entresto 49/51 bid    due to BP today in clinic and lack of home BP log I will not increase the dose today  Beta blocker: Metoprolol Suc 200mg daily   Diuretic:Furosemide 40mg daily and PRN "   Aldosterone antagonist: Spironolactone 25mg daily   SGLT-2 Inhibitor:  Dapagliflozin 10 mg once daily   Amiodarone - afib   Eliquis 5mg bid -afib     DM   <6.5  Diabetes medication:   Farxiga 10mg daily   Metformin ER 1000mg bid       Lipids:  LDL<100  lipid medication:   Crestor 10mg       Lifestyle   Lifestyle Recommendations From Today's Visit:   Diet: DASH diet  Continue to eat DASH/MED style diet.   Continue to exercise as tolerated.  Salt restriction to <2300mg daily       Blood Work Ordered At Today's visit: per Dr Bacon  Studies Ordered at Todays Visit: 12/23   Follow-Up: 4 week(s) after Dr. Bacon appt     Mynor Harmon, Fawad  [facility]  Phone: [phone], Fax: [phone]    This note was created using voice recognition software (Dragon). The accuracy of the dictation is limited by the abilities of the software. I have reviewed the note prior to signing, however some errors in grammar and context are still possible. If you have any questions related to this note please do not hesitate to contact our office.     CC:  Du Mullen M.D.  No ref. provider found

## 2023-11-07 NOTE — PROGRESS NOTES
Arrhythmia Clinic Note (New patient)     DOS: 11/7/2023    Referring physician: Akash Bacon    Chief complaint/Reason for consult: Afib    HPI: 57 y/o M with acute systolic heart failure and AF with RVR. He had excessive ETOH consumption prior to his HF presentation. Despite amio loading he could not be cardioverted inpatient, failed x2. He remained in Afib as an outpatient but feels well today and is in sinus now. Compliant with medications.     ROS (+ highlighted in bold):  Constitutional: Fevers/chills/fatigue/weightloss  HEENT: Blurry vision/eye pain/sore throat/hearing loss  Respiratory: Shortness of breath/cough  Cardiovascular: Chest pain/palpitations/edema/orthopnea/syncope  GI: Nausea/vomitting/diarrhea  MSK: Arthralgias/myagias/muscle weakness  Skin: Rash/sores  Neurological: Numbness/tremors/vertigo  Endocrine: Excessive thirst/polyuria/cold intolerance/heat intolerance  Psych: Depression/anxiety    Past Medical History:   Diagnosis Date    Apnea, sleep     Atrial fibrillation (HCC)     Bronchitis     Chickenpox     Cold 12/07/2011    stomach flu 12/3/11    Diabetes (HCC)     Difficulty breathing     Hearing difficulty     Hyperlipidemia     Hypertension     Influenza     Shortness of breath     Snoring     Swelling of lower extremity     Wears glasses        Past Surgical History:   Procedure Laterality Date    VENTRAL HERNIA REPAIR LAPAROSCOPIC  12/14/2011    Performed by TUYET RIDLEY at SURGERY [facility]    COLON RESECTION      OTHER ABDOMINAL SURGERY      repair of diverticulitis; colon resection       Social History     Socioeconomic History    Marital status:      Spouse name: Not on file    Number of children: Not on file    Years of education: Not on file    Highest education level: Not on file   Occupational History    Not on file   Tobacco Use    Smoking status: Some Days     Current packs/day: 0.00     Average packs/day: 1 pack/day for 32.0 years (32.0 ttl pk-yrs)     Types:  Cigarettes     Start date: 2001     Last attempt to quit: 10/22/2023     Years since quittin.0    Smokeless tobacco: Current    Tobacco comments:     Smoked a pack of day since 36. Quit three weeks ago and have had a few cigarettes the last few days   Vaping Use    Vaping Use: Former    Quit date: 2019   Substance and Sexual Activity    Alcohol use: Not Currently     Alcohol/week: 2.4 oz     Types: 4 Standard drinks or equivalent per week     Comment: Was an every night heavy beer drinker until a month ago.    Drug use: Not Currently     Types: Marijuana, Oral     Comment: one every two weeks.     Sexual activity: Not on file   Other Topics Concern    Not on file   Social History Narrative    Not on file     Social Determinants of Health     Financial Resource Strain: Not on file   Food Insecurity: Not on file   Transportation Needs: Not on file   Physical Activity: Not on file   Stress: Not on file   Social Connections: Not on file   Intimate Partner Violence: Not on file   Housing Stability: Not on file       Family History   Problem Relation Age of Onset    Lung Cancer Mother         Diagnosed with lung cancer this past summer    Prostate cancer Father         Passed away prostate cancer and heart problems       Allergies   Allergen Reactions    Cleocin [Clindamycin Hcl] Hives    Pcn [Penicillins] Hives       Current Outpatient Medications   Medication Sig Dispense Refill    sacubitril-valsartan (ENTRESTO) 49-51 MG Tab Take 1 Tablet by mouth 2 times a day. 180 Tablet 1    metoprolol (TOPROL XL) 200 MG XL tablet Take 1 Tablet by mouth every day. 90 Tablet 1    dapagliflozin propanediol (FARXIGA) 10 MG Tab Take 1 Tablet by mouth every day. 90 Tablet 1    spironolactone (ALDACTONE) 25 MG Tab Take 1 Tablet by mouth every day. 90 Tablet 1    furosemide (LASIX) 40 MG Tab Take 1 Tablet by mouth every day. 90 Tablet 1    apixaban (ELIQUIS) 5mg Tab Take 1 Tablet by mouth 2 times a day. 180 Tablet 1     amiodarone (CORDARONE) 200 MG Tab Take 1 Tablet by mouth every day. 30 Tablet 11    metFORMIN ER (GLUCOPHAGE XR) 500 MG TABLET SR 24 HR Take 1,000 mg by mouth 2 times a day. 1,000 mg = 2 tabs      rosuvastatin (CRESTOR) 10 MG Tab Take 10 mg by mouth every day.      Probiotic Product (PROBIOTIC PO) Take 1 Tablet by mouth every day.      zolpidem (AMBIEN) 5 MG Tab Take 1-2 Tablets by mouth at bedtime as needed for Sleep (Take 1-2 tabs on the night of the sleep study as needed) for up to 1 day. Take 1-2 tabs on the night of the sleep study as needed (Patient not taking: Reported on 11/7/2023) 2 Tablet 0     No current facility-administered medications for this visit.       Physical Exam:  Vitals:    11/07/23 1406   BP: 114/64   BP Location: Left arm   Patient Position: Sitting   BP Cuff Size: Adult   Pulse: (!) 59   Resp: 14   SpO2: 98%   Weight: 103 kg (228 lb)   Height: 1.829 m (6')     General appearance: NAD, conversant   Eyes: anicteric sclerae, moist conjunctivae; no lid-lag; PERRLA  HENT: Atraumatic; oropharynx clear with moist mucous membranes and no mucosal ulcerations; normal hard and soft palate  Neck: Trachea midline; FROM, supple, no thyromegaly or lymphadenopathy  Lungs: CTA, with normal respiratory effort and no intercostal retractions  CV: RRR, no MRGs, no JVD   Abdomen: Soft, non-tender; no masses or HSM  Extremities: No peripheral edema or extremity lymphadenopathy  Skin: Normal temperature, turgor and texture; no rash, ulcers or subcutaneous nodules  Psych: Appropriate affect, alert and oriented to person, place and time    Data:  Lipids:   Lab Results   Component Value Date/Time    CHOLSTRLTOT 116 12/08/2022 10:54 AM    TRIGLYCERIDE 58 12/08/2022 10:54 AM    HDL 69 12/08/2022 10:54 AM    LDL 35 12/08/2022 10:54 AM        BMP:  Lab Results   Component Value Date/Time    SODIUM 139 10/26/2023 1045    POTASSIUM 4.8 10/26/2023 1045    CHLORIDE 103 10/26/2023 1045    CO2 23 10/26/2023 1045    GLUCOSE 122  "(H) 10/26/2023 1045    BUN 33 (H) 10/26/2023 1045    CREATININE 1.25 10/26/2023 1045    CALCIUM 10.0 10/26/2023 1045    ANION 13.0 10/26/2023 1045        TSH:   Lab Results   Component Value Date/Time    TSHULTRASEN 2.010 10/18/2023 0048        THYROXINE (T4):   No results found for: \"FREEDIR\"     CBC:   Lab Results   Component Value Date/Time    WBC 7.6 10/22/2023 12:38 AM    RBC 4.48 (L) 10/22/2023 12:38 AM    HEMOGLOBIN 13.6 (L) 10/22/2023 12:38 AM    HEMATOCRIT 42.3 10/22/2023 12:38 AM    MCV 94.4 10/22/2023 12:38 AM    MCH 30.4 10/22/2023 12:38 AM    MCHC 32.2 (L) 10/22/2023 12:38 AM    RDW 43.3 10/22/2023 12:38 AM    PLATELETCT 169 10/22/2023 12:38 AM    MPV 11.3 10/22/2023 12:38 AM    NEUTSPOLYS 59.80 10/18/2023 12:48 AM    LYMPHOCYTES 32.00 10/18/2023 12:48 AM    MONOCYTES 6.50 10/18/2023 12:48 AM    EOSINOPHILS 1.00 10/18/2023 12:48 AM    BASOPHILS 0.40 10/18/2023 12:48 AM    IMMGRAN 0.30 10/18/2023 12:48 AM    NRBC 0.00 10/18/2023 12:48 AM    NEUTS 4.33 10/18/2023 12:48 AM    LYMPHS 2.32 10/18/2023 12:48 AM    MONOS 0.47 10/18/2023 12:48 AM    EOS 0.07 10/18/2023 12:48 AM    BASO 0.03 10/18/2023 12:48 AM    IMMGRANAB 0.02 10/18/2023 12:48 AM    NRBCAB 0.00 10/18/2023 12:48 AM        CBC w/o DIFF  Lab Results   Component Value Date/Time    WBC 7.6 10/22/2023 12:38 AM    RBC 4.48 (L) 10/22/2023 12:38 AM    HEMOGLOBIN 13.6 (L) 10/22/2023 12:38 AM    MCV 94.4 10/22/2023 12:38 AM    MCH 30.4 10/22/2023 12:38 AM    MCHC 32.2 (L) 10/22/2023 12:38 AM    RDW 43.3 10/22/2023 12:38 AM    MPV 11.3 10/22/2023 12:38 AM       Prior echo/stress results reviewed: EF <30% Normal LA    Prior cath results reviewed: Non-obstructive CAD    EKG interpreted by me: SR with IVCD    Impression/Plan:  1. Persistent atrial fibrillation (HCC)  EKG    CL-EP ABLATION ATRIAL FIBRILLATION    EC-FILIBERTO W/O CONT        Persistent afib  High risk medication use  NICM EF 30%  Acute systolic heart failure    - Heart failure likely due to afib  - " Has spontaneously reverted to sinus on amiodarone, continue 200mg, TSH and CMP q 6 months  - Discussed alternative AAD long term vs ablation, they prefer ablation. We discussed pulmonary vein isolation for therapeutic management and continued rhythm control.  We discussed the risks and benefits of this procedure.  Risks include 1-3% risk of major cardiovascular event including stroke, myocardial infarction, phrenic nerve damage, esophageal injury and/or fistula formation, cardiac perforation, pericardial effusion, tamponade, major bleeding, or death.  I quoted a 70 to 80% chance free of atrial fibrillation at 12 months.  We discussed that he may also need a second procedure.  - Long term can likely stop HF meds and probably OAC as well if returns to NSR with normal LVEF    Plan AF ablation, persistent, in sinus on amio      Bryn Flores MD  Cardiac Electrophysiology

## 2023-11-08 ENCOUNTER — TELEPHONE (OUTPATIENT)
Dept: CARDIOLOGY | Facility: MEDICAL CENTER | Age: 58
End: 2023-11-08
Payer: COMMERCIAL

## 2023-11-08 NOTE — TELEPHONE ENCOUNTER
Called Deepa - patient scheduled for afib ablation w/FILIBERTO on 1-15-24 with Dr. Flores. Patient has been instructed to check in at 7:30 for 9:30 case time. Hold Metformin am of. Message sent to authorizations. Emailed Carto

## 2023-11-08 NOTE — TELEPHONE ENCOUNTER
MERCEDES    Caller: Loida - patients wife     Topic/issue: Patients wife returned phone call.     Please advise.     Callback Number: [phone] (home) [phone] (work)    Thank you,     Savanna OROZCO

## 2023-11-08 NOTE — TELEPHONE ENCOUNTER
----- Message from Bryn Flores M.D. sent at 11/7/2023  3:05 PM PST -----  Please schedule afib ablation, no meds to hold, thanks

## 2023-11-20 LAB — EKG IMPRESSION: NORMAL

## 2023-11-20 PROCEDURE — 93010 ELECTROCARDIOGRAM REPORT: CPT | Performed by: INTERNAL MEDICINE

## 2023-12-01 ENCOUNTER — SLEEP STUDY (OUTPATIENT)
Dept: SLEEP MEDICINE | Facility: MEDICAL CENTER | Age: 58
End: 2023-12-01
Attending: INTERNAL MEDICINE
Payer: COMMERCIAL

## 2023-12-01 DIAGNOSIS — G47.33 OSA (OBSTRUCTIVE SLEEP APNEA): ICD-10-CM

## 2023-12-01 DIAGNOSIS — R06.83 SNORING: ICD-10-CM

## 2023-12-01 PROCEDURE — 95811 POLYSOM 6/>YRS CPAP 4/> PARM: CPT | Performed by: INTERNAL MEDICINE

## 2023-12-04 NOTE — PROCEDURES
Patient: MAURO MILLARD  ID: [id number] Date: 12/1/2023 Exam No.:   MONTAGE: Standard  STUDY TYPE: Split Night  RECORDING TECHNIQUE:   After the scalp was prepared, gold plated electrodes were applied to the scalp according to the International 10-20 System. EEG (electroencephalogram) was continuously monitored from the O1-M2, O2-M1, C3-M2, C4-M1, F3-M2, and F4-M1. EOGs (electrooculograms) were monitored by electrodes placed at the left and right outer canthi. Chin EMG (electromyogram) was monitored by electrodes placed on the mentalis and sub-mentalis muscles. Nasal and oral airflow were monitored using a triple port thermocouple as well as oronasal pressure transducer. Respiratory effort was measured by inductive plethysmography technology employing abdominal and thoracic belts. Blood oxygen saturation and pulse were monitored by pulse oximetry. Heart rhythm was monitored by surface electrocardiogram. Leg EMG was studied using surface electrodes placed on left and right anterior tibialis. A microphone was used to monitor tracheal sounds and snoring. Body position was monitored and documented by technician observation.   SCORING CRITERIA:   A modification of the AASM manual for scoring of sleep and associated events was used. Obstructive apneas were scored by cessation of airflow for at least 10 seconds with continuing respiratory effort. Central apneas were scored by cessation of airflow for at least 10 seconds with no respiratory effort. Hypopneas were scored by a 30% or more reduction in airflow for at least 10 seconds accompanied by arterial oxygen desaturation of 3% or an arousal. For CMS (Medicare) patients, per AASM rule 1B, hypopneas are scored by 30% with mild reduction in airflow for at least 10 seconds accompanied by arterial saturation decreased at 4%.  DIAGNOSTIC  Study start time was 08:41:13 PM. Diagnostic recording time was 290 minutes with a total sleep time of 135 minutes resulting in a sleep  efficiency of 46.72%%. Sleep latency from the start of the study was 51 minutes and the latency from sleep to REM was 179 minutes. In total,70 arousals were scored for an arousal index of 31.0.  Respiratory:  There were a total of 2 apneas consisting of 0 obstructive apneas, 0 mixed apneas, and 2 central apneas. A total of 59 hypopneas were scored. The apnea index was 0.89 per hour and the hypopnea index was 26.13 per hour resulting in an overall AHI of 27.01. AHI during REM was 42.0 and AHI while supine was 37.50.  Oximetry:  There was a mean oxygen saturation of 92.0%. The minimum oxygen saturation during NREM sleep was 82.0% and in REM was 85.0%. Time spent during sleep with oxygen saturations <88% was 10.5 minutes.   Cardiac:  The highest heart rate seen while awake was 88 BPM while the highest heart rate during sleep was 70 BPM with an average sleeping heart rate of 61 BPM.  Limb Movements:  There were a total of 79 PLMs during sleep, which resulted in a PLM index of 35.0. There were 18 PLMs associated with arousals which resulted in a PLMS arousal index of 8.0.  TREATMENT:  Treatment recording time was 3h 18.0m (198 minutes) with a total sleep time of 2h 58.5m (178 minutes) resulting in a sleep efficiency of 90.2%. Sleep latency from the start of treatment was 01 minutes and REM latency from sleep onset was 0h 51.0m. The patient had 52 arousals in total for an arousal index of 17.5.  Respiratory:   There were 3 apneas in total consisting of 0 obstructive apneas, 3 central apneas, and 0 mixed apneas for an apnea index of 1.01. The patient had 14 hypopneas in total, which resulted in a hypopnea index of 4.71. The overall AHI was 5.71, with a REM AHI of 2.76, and a supine AHI of 6.67.   Oximetry:  The mean SaO2 during treatment was 91.0%. The minimum oxygen saturation in NREM was 87.0 % and in REM was 88.0%. Patient spent 3.8 minutes of TST with SaO2 <88%.  Cardiac:  The highest heart rate during sleep was 69 BPM  with an average sleeping heart rate of 61BPM.  Limb Movements:  There were a total of 0 PLMS during titration sleep time that resulted in an index of 0.0. There were 4 PLMS associated with arousals. This resulted in a PLM arousal index of 1.3.  Titration:   CPAP was tried from 5 to 8cm H2O.  The patient did best on CPAP at 5-6 CWP with normalized apnea hypopnea indices, supine REM sleep, but persisting low ignacio oxygen levels.  This was a fully attended sleep study. This test was technically adequate.  He used a medium air fit N20 mask and heated humidification.          ASSESSMENT:    Moderate positional obstructive sleep apnea hypopnea - overall AHI 27.0, supine AHI 37.5, mean event duration 22.0 seconds seconds, longest event duration 51.3 seconds  Mild nocturnal desaturation - ignacio saturation 82% - saturations less than 88% for 4.8% of the TST  Successful CPAP titration.  On CPAP at 6 CWP the resultant AHI was 3.0, the ignacio saturation 87%, the mean saturation 91%, and included supine REM sleep.      RECOMMENDATION:    Recommend APAP 6-10 cmH2O using a medium air fit N20 mask and heated humidification followed by data card and clinical review 31-90 days after receiving equipment.    Clinical correlation is needed.  An empiric trial of auto titrating CPAP may be an acceptable option.      NOTES:     The AHI is the number of apneas (cessation of airflow for 10 seconds or longer) and hypopneas (shallow breaths accompanied by at least a 3% drop in the oxygen saturation) that occur per hour. Less than 5 per hour is normal, 5-15 per hour is mild sleep apnea, 15-30 per hour moderate sleep apnea, and greater than 30 per hour severe sleep apnea.    Patients with sleep apnea are at increased risk of cardiovascular disease including systemic arterial hypertension, pulmonary arterial hypertension, (transient or fixed), transient ischemic attacks, and an elevated risk of stroke, heart attack, sudden death, or arrhythmia  between the hours of 11 PM and 6 AM.  Sleep apnea patients have an increased risk of motor vehicle accidents, type 2 diabetes, gastroesophageal reflux, repetitive mechanical trauma to pharyngeal muscles with inflammation and denervation, frequent EEG arousals leading to nonrestorative sleep, excessive daytime somnolence, fatigue, depression, poor pain control, irritability, and a lower quality of life.        Dr. Everette Parekh MD

## 2023-12-07 ENCOUNTER — HOSPITAL ENCOUNTER (OUTPATIENT)
Dept: LAB | Facility: MEDICAL CENTER | Age: 58
End: 2023-12-07
Attending: INTERNAL MEDICINE
Payer: COMMERCIAL

## 2023-12-07 ENCOUNTER — APPOINTMENT (OUTPATIENT)
Dept: ADMISSIONS | Facility: MEDICAL CENTER | Age: 58
End: 2023-12-07
Attending: INTERNAL MEDICINE
Payer: COMMERCIAL

## 2023-12-07 DIAGNOSIS — I51.89 LEFT VENTRICULAR SYSTOLIC DYSFUNCTION, NYHA CLASS 3: ICD-10-CM

## 2023-12-07 DIAGNOSIS — R06.09 DYSPNEA ON EXERTION: ICD-10-CM

## 2023-12-07 DIAGNOSIS — I50.20 ACC/AHA STAGE C SYSTOLIC HEART FAILURE (HCC): ICD-10-CM

## 2023-12-07 DIAGNOSIS — E11.65 TYPE 2 DIABETES MELLITUS WITH HYPERGLYCEMIA, WITHOUT LONG-TERM CURRENT USE OF INSULIN (HCC): ICD-10-CM

## 2023-12-07 DIAGNOSIS — Z79.899 HIGH RISK MEDICATION USE: ICD-10-CM

## 2023-12-07 DIAGNOSIS — I10 HTN (HYPERTENSION), MALIGNANT: ICD-10-CM

## 2023-12-07 LAB
ANION GAP SERPL CALC-SCNC: 13 MMOL/L (ref 7–16)
BUN SERPL-MCNC: 28 MG/DL (ref 8–22)
CALCIUM SERPL-MCNC: 9.7 MG/DL (ref 8.5–10.5)
CHLORIDE SERPL-SCNC: 99 MMOL/L (ref 96–112)
CO2 SERPL-SCNC: 27 MMOL/L (ref 20–33)
CREAT SERPL-MCNC: 1.4 MG/DL (ref 0.5–1.4)
GFR SERPLBLD CREATININE-BSD FMLA CKD-EPI: 58 ML/MIN/1.73 M 2
GLUCOSE SERPL-MCNC: 145 MG/DL (ref 65–99)
NT-PROBNP SERPL IA-MCNC: 263 PG/ML (ref 0–125)
POTASSIUM SERPL-SCNC: 5 MMOL/L (ref 3.6–5.5)
SODIUM SERPL-SCNC: 139 MMOL/L (ref 135–145)

## 2023-12-07 PROCEDURE — 36415 COLL VENOUS BLD VENIPUNCTURE: CPT

## 2023-12-07 PROCEDURE — 80048 BASIC METABOLIC PNL TOTAL CA: CPT

## 2023-12-07 PROCEDURE — 83880 ASSAY OF NATRIURETIC PEPTIDE: CPT

## 2023-12-11 ENCOUNTER — OFFICE VISIT (OUTPATIENT)
Dept: CARDIOLOGY | Facility: MEDICAL CENTER | Age: 58
End: 2023-12-11
Attending: INTERNAL MEDICINE
Payer: COMMERCIAL

## 2023-12-11 VITALS
WEIGHT: 235 LBS | RESPIRATION RATE: 16 BRPM | SYSTOLIC BLOOD PRESSURE: 106 MMHG | DIASTOLIC BLOOD PRESSURE: 62 MMHG | OXYGEN SATURATION: 96 % | HEART RATE: 56 BPM | HEIGHT: 72 IN | BODY MASS INDEX: 31.83 KG/M2

## 2023-12-11 DIAGNOSIS — I51.89 LEFT VENTRICULAR SYSTOLIC DYSFUNCTION, NYHA CLASS 3: ICD-10-CM

## 2023-12-11 DIAGNOSIS — I48.19 HYPERCOAGULABLE STATE DUE TO PERSISTENT ATRIAL FIBRILLATION (HCC): ICD-10-CM

## 2023-12-11 DIAGNOSIS — R06.09 DYSPNEA ON EXERTION: ICD-10-CM

## 2023-12-11 DIAGNOSIS — E11.65 TYPE 2 DIABETES MELLITUS WITH HYPERGLYCEMIA, WITHOUT LONG-TERM CURRENT USE OF INSULIN (HCC): ICD-10-CM

## 2023-12-11 DIAGNOSIS — I10 HTN (HYPERTENSION), MALIGNANT: ICD-10-CM

## 2023-12-11 DIAGNOSIS — I42.8 NON-ISCHEMIC CARDIOMYOPATHY (HCC): ICD-10-CM

## 2023-12-11 DIAGNOSIS — I50.43 ACUTE ON CHRONIC COMBINED SYSTOLIC AND DIASTOLIC HEART FAILURE (HCC): ICD-10-CM

## 2023-12-11 DIAGNOSIS — I48.0 PAF (PAROXYSMAL ATRIAL FIBRILLATION) (HCC): ICD-10-CM

## 2023-12-11 DIAGNOSIS — Z79.899 HIGH RISK MEDICATION USE: ICD-10-CM

## 2023-12-11 DIAGNOSIS — D68.69 HYPERCOAGULABLE STATE DUE TO PERSISTENT ATRIAL FIBRILLATION (HCC): ICD-10-CM

## 2023-12-11 DIAGNOSIS — I50.20 ACC/AHA STAGE C SYSTOLIC HEART FAILURE (HCC): ICD-10-CM

## 2023-12-11 LAB — EKG IMPRESSION: NORMAL

## 2023-12-11 PROCEDURE — 3074F SYST BP LT 130 MM HG: CPT | Performed by: INTERNAL MEDICINE

## 2023-12-11 PROCEDURE — 99215 OFFICE O/P EST HI 40 MIN: CPT | Mod: 25 | Performed by: INTERNAL MEDICINE

## 2023-12-11 PROCEDURE — 93010 ELECTROCARDIOGRAM REPORT: CPT | Performed by: INTERNAL MEDICINE

## 2023-12-11 PROCEDURE — 3078F DIAST BP <80 MM HG: CPT | Performed by: INTERNAL MEDICINE

## 2023-12-11 PROCEDURE — 99212 OFFICE O/P EST SF 10 MIN: CPT | Performed by: INTERNAL MEDICINE

## 2023-12-11 PROCEDURE — 93005 ELECTROCARDIOGRAM TRACING: CPT | Performed by: INTERNAL MEDICINE

## 2023-12-11 RX ORDER — SACUBITRIL AND VALSARTAN 97; 103 MG/1; MG/1
1 TABLET, FILM COATED ORAL 2 TIMES DAILY
Qty: 180 TABLET | Refills: 4 | Status: SHIPPED | OUTPATIENT
Start: 2023-12-11 | End: 2024-02-05 | Stop reason: SDUPTHER

## 2023-12-11 ASSESSMENT — ENCOUNTER SYMPTOMS
SENSORY CHANGE: 0
FEVER: 0
DOUBLE VISION: 0
MYALGIAS: 0
SHORTNESS OF BREATH: 1
BRUISES/BLEEDS EASILY: 0
DIAPHORESIS: 0
BLURRED VISION: 0
PALPITATIONS: 0
DIZZINESS: 0
COUGH: 0
ABDOMINAL PAIN: 0
FALLS: 0
DEPRESSION: 0
HEADACHES: 0
MEMORY LOSS: 0

## 2023-12-11 ASSESSMENT — FIBROSIS 4 INDEX: FIB4 SCORE: 1.19

## 2023-12-11 NOTE — PROGRESS NOTES
"CHF Pharmacotherapy Visit    Informed written consent was given on: 11/7/23    Harjit Pantoja is here for CHF     HPI  Pertinent Interval History since last visit:   Pt presents from f/u w/ his cardiologist, Dr. Bacon, who increased his Entresto dose.    Most recent EF:  15% (10/2023)    Potential Barriers to Care:  Adherence: denies missed doses of CHF GDMT  Side effects: none  Affordability: No issues  Others: N/a    Current CHF Medications - including dose:   Entresto or ACE/ARB: Entresto 49-51 mg BID   Beta blocker: Metoprolol  mg once daily   Diuretic: Furosemide 40 mg once daily  Aldosterone antagonist: Spironolactone 25 mg once daily  SGLT2i: Farxiga 10 mg once daily    Home BP and HR:  BP ~ 100-110's/60's  HR ~ 60's     Lifestyle:  Change in weight: Stable w/in 5 lbs or so  Exercise habits: moderate regular exercise program. Pt works in a [employer] - walks a lot at work M-F.  Diet: low carbohydrate, low sodium, abstains from EtOH.     DATA REVIEW  There were no vitals filed for this visit.    Lab Results   Component Value Date/Time    SODIUM 139 12/07/2023 09:09 AM    POTASSIUM 5.0 12/07/2023 09:09 AM    CHLORIDE 99 12/07/2023 09:09 AM    CO2 27 12/07/2023 09:09 AM    GLUCOSE 145 (H) 12/07/2023 09:09 AM    BUN 28 (H) 12/07/2023 09:09 AM    CREATININE 1.40 12/07/2023 09:09 AM    CREATININE 1.0 08/28/2005 05:15 AM     Lab Results   Component Value Date/Time    ALKPHOSPHAT 46 10/21/2023 01:31 AM    ASTSGOT 17 10/21/2023 01:31 AM    ALTSGPT 24 10/21/2023 01:31 AM    TBILIRUBIN 0.4 10/21/2023 01:31 AM    INR 1.13 08/27/2005 04:00 AM    ALBUMIN 3.6 10/21/2023 01:31 AM      No components found for: \"MICROALBUMINCREATRATIOURINE\"    Renal function:  Calculated creatinine clearance: ~ 73 ml/min   eGFR: 58 mL/min/1.73 m2    Other:  Up to date on pneumococcal vaccine? No - pt will obtain at outpt pharmacy prior to f/u    Recent Imaging Studies:    None since last visit    ASSESSMENT AND PLAN  CHF  Pt presents " to CHF clinic doing well overall since his last visit. Denies new onset SOB, LE edema, or orthopnea.  Pt reported home BP/HR are at goal currently. Vitals at goal today in clinic.  Counseled pt regarding importance of adequate hydration as well as precautions to take when increasing Entresto dose. Ordered BMP for pt to obtain w/in the next 2-4 weeks prior to next f/u.  Pt reports that he's smoking 1/2 PPD (down from 1 PPD). Using Nicorette gum to help w/ cessation successfully.    CHF medications (changes are bolded)  Entresto or ACE/ARB: INCREASE Entresto up to  mg BID  Beta blocker: Metoprolol  mg once daily   Diuretic: Furosemide 40 mg once daily  Aldosterone antagonist: Spironolactone 25 mg once daily  SGLT2i: Farxiga 10 mg once daily    2. Lifestyle   Recommendations From Today's Visit:   Continue to limit fluid intake to 2L/day and Na+ intake to 2gm/day  Continue to eat DASH/MED style diet.   Continue to exercise as tolerated.     Blood Work Ordered At Today's visit:   BMP    Follow-Up:   7 weeks per pt - recommended f/u in 2-4 weeks but pt prefers to call if and r/s sooner if they have issues.    Timmy Saldaña, PharmD, BCACP    CC:  Du Mullen M.D.  No ref. provider found    xMedications reconciled  xFlow sheets updated

## 2023-12-11 NOTE — PROGRESS NOTES
Chief Complaint   Patient presents with    Follow-Up     Dx: ACC/AHA stage C systolic heart failure (HCC)      Atrial Fibrillation       Subjective     Harjit Pantoja is a 58 y.o. male who presents today for cardiac care and evaluation in the heart failure clinic because of recent hospitalization due to heart failure exacerbation. Patient did have a coronary angiogram, which showed non-obstructive coronary arterial disease. The diagnosis of HF was made in 10/2023. It was presumed to be non-ischemic in natured due to ETOH abuse, new onset AF. The left ventricular systolic function was documented to be at 30%. Patient was diuresed in the hospital and discharged home with guidelines directed medical therapy. He also failed FILIBERTO cardioversion while in the hospital.    Patient still gets winded with daily living activities and exertion. No symptoms at rest.    I have independently interpreted and reviewed blood tests results with patient in clinic which shows GFR of 58, K of 5, NT pro BNP of 263.    Patient was able to complete 365 m during his 6 minute walk test. His O2 saturation at baseline was 95% and at the end of the test, the O2 saturation was 95%. he reported 1 level of dyspnea on Jorden scale.    I have personally interpreted EKG today with patient, there is no evidence of acute coronary syndrome, no evidence of prior infarct, normal CA and QT interval, no significant conduction disease. Sinus rhythm.    Past Medical History:   Diagnosis Date    Apnea, sleep     Atrial fibrillation (HCC)     Bronchitis     Chickenpox     Cold 12/07/2011    stomach flu 12/3/11    Diabetes (HCC)     Difficulty breathing     Hearing difficulty     Hyperlipidemia     Hypertension     Influenza     Shortness of breath     Snoring     Swelling of lower extremity     Wears glasses      Past Surgical History:   Procedure Laterality Date    VENTRAL HERNIA REPAIR LAPAROSCOPIC  12/14/2011    Performed by TUYET RIDLEY at SURGERY [facility]  [facility]    COLON RESECTION      OTHER ABDOMINAL SURGERY      repair of diverticulitis; colon resection     Family History   Problem Relation Age of Onset    Lung Cancer Mother         Diagnosed with lung cancer this past summer    Prostate cancer Father         Passed away prostate cancer and heart problems     Social History     Socioeconomic History    Marital status:      Spouse name: Not on file    Number of children: Not on file    Years of education: Not on file    Highest education level: Not on file   Occupational History    Not on file   Tobacco Use    Smoking status: Some Days     Current packs/day: 0.00     Average packs/day: 1 pack/day for 32.0 years (32.0 ttl pk-yrs)     Types: Cigarettes     Start date: 2001     Last attempt to quit: 10/22/2023     Years since quittin.1    Smokeless tobacco: Current    Tobacco comments:     Smoked a pack of day since 36. Quit three weeks ago and have had a few cigarettes the last few days   Vaping Use    Vaping Use: Former    Quit date: 2019   Substance and Sexual Activity    Alcohol use: Not Currently     Alcohol/week: 2.4 oz     Types: 4 Standard drinks or equivalent per week     Comment: Was an every night heavy beer drinker until a month ago.    Drug use: Not Currently     Types: Marijuana, Oral     Comment: one every two weeks.     Sexual activity: Not on file   Other Topics Concern    Not on file   Social History Narrative    Not on file     Social Determinants of Health     Financial Resource Strain: Not on file   Food Insecurity: Not on file   Transportation Needs: Not on file   Physical Activity: Not on file   Stress: Not on file   Social Connections: Not on file   Intimate Partner Violence: Not on file   Housing Stability: Not on file     Allergies   Allergen Reactions    Cleocin [Clindamycin Hcl] Hives    Pcn [Penicillins] Hives     Outpatient Encounter Medications as of 2023   Medication Sig Dispense Refill     sacubitril-valsartan (ENTRESTO) 49-51 MG Tab Take 1 Tablet by mouth 2 times a day. 180 Tablet 1    metoprolol (TOPROL XL) 200 MG XL tablet Take 1 Tablet by mouth every day. 90 Tablet 1    dapagliflozin propanediol (FARXIGA) 10 MG Tab Take 1 Tablet by mouth every day. 90 Tablet 1    spironolactone (ALDACTONE) 25 MG Tab Take 1 Tablet by mouth every day. 90 Tablet 1    furosemide (LASIX) 40 MG Tab Take 1 Tablet by mouth every day. 90 Tablet 1    apixaban (ELIQUIS) 5mg Tab Take 1 Tablet by mouth 2 times a day. 180 Tablet 1    amiodarone (CORDARONE) 200 MG Tab Take 1 Tablet by mouth every day. 30 Tablet 11    metFORMIN ER (GLUCOPHAGE XR) 500 MG TABLET SR 24 HR Take 1,000 mg by mouth 2 times a day. 1,000 mg = 2 tabs      rosuvastatin (CRESTOR) 10 MG Tab Take 10 mg by mouth every day.      Probiotic Product (PROBIOTIC PO) Take 1 Tablet by mouth every day.       No facility-administered encounter medications on file as of 12/11/2023.     Review of Systems   Constitutional:  Negative for diaphoresis and fever.   HENT:  Negative for nosebleeds.    Eyes:  Negative for blurred vision and double vision.   Respiratory:  Positive for shortness of breath. Negative for cough.    Cardiovascular:  Negative for chest pain and palpitations.   Gastrointestinal:  Negative for abdominal pain.   Genitourinary:  Negative for dysuria and frequency.   Musculoskeletal:  Negative for falls and myalgias.   Skin:  Negative for rash.   Neurological:  Negative for dizziness, sensory change and headaches.   Endo/Heme/Allergies:  Does not bruise/bleed easily.   Psychiatric/Behavioral:  Negative for depression and memory loss.               Objective     /62 (BP Location: Left arm, Patient Position: Sitting, BP Cuff Size: Adult)   Pulse (!) 56   Resp 16   Ht 1.829 m (6')   Wt 107 kg (235 lb)   SpO2 96%   BMI 31.87 kg/m²     Physical Exam  Vitals and nursing note reviewed.   Constitutional:       General: He is not in acute distress.      Appearance: He is not diaphoretic.   HENT:      Head: Normocephalic and atraumatic.      Right Ear: External ear normal.      Left Ear: External ear normal.      Nose: No congestion or rhinorrhea.   Eyes:      General:         Right eye: No discharge.         Left eye: No discharge.   Neck:      Thyroid: No thyromegaly.      Vascular: No JVD.   Cardiovascular:      Rate and Rhythm: Normal rate. Rhythm irregular.      Pulses: Normal pulses.   Pulmonary:      Effort: No respiratory distress.   Abdominal:      General: There is no distension.      Tenderness: There is no abdominal tenderness.   Musculoskeletal:         General: No swelling or tenderness.      Right lower leg: No edema.      Left lower leg: No edema.   Skin:     General: Skin is warm and dry.   Neurological:      Mental Status: He is alert and oriented to person, place, and time.      Cranial Nerves: No cranial nerve deficit.   Psychiatric:         Behavior: Behavior normal.                Assessment & Plan     1. Atrial fibrillation, unspecified type (HCC)  EKG      2. ACC/AHA stage C systolic heart failure (HCC)        3. Left ventricular systolic dysfunction, NYHA class 3        4. Persistent atrial fibrillation (HCC)        5. Non-ischemic cardiomyopathy (HCC)        6. Hypercoagulable state due to persistent atrial fibrillation (HCC)        7. HTN (hypertension), malignant        8. Type 2 diabetes mellitus with hyperglycemia, without long-term current use of insulin (HCC)        9. Dyspnea on exertion        10. High risk medication use            Medical Decision Making: Today's Assessment/Status/Plan:   Non-Ischemic Cardiomyopathy:  Chronically illed condition which requires ongoing close monitoring and treatment to improve survival rate along with decreasing risk of clinical decompensation sudden cardiac death and hospitalization.    Today, based on physical examination findings, patient is euvolemic. No JVD, lungs are clear to auscultation, no  pitting edema in bilateral lower extremities, no ascites.     Dry weight is 235 lbs.    Based on the overall clinical history and profile, patient is a good candidate for Entresto therapy (with superiority over ACE-I therapy in terms of survival benefits and prevention of future hospitalization).  Will increase Entresto therapy at 97/103 mg po bid.     Will continue Toprol at 200 mg daily.     Aldactone antagonist with Spironolactone / Eplerenone 25 mg daily.    Based on recent data on SGLT2 and heart failure with reduced ejection fraction, patient will be benefited from Farxiga 10 mg p.o. once a day for further reduction in mortality and hospitalization with absolute risk reduction of 4.9%.  Therefore, I will continue patient on Farxiga 10 mg p.o. once a day.  Risks and benefits were explained to patient and patient has agreed to proceed.    We will consider ICD placement for primary prevention in 3 months if left ventricular systolic function remains less than 35% after optimization of evidence based heart failure medical regimen.    Will need to stop drinking ETOH all together.    Paroxysmal atrial fibrillation:  Anticoagulation with Eliquis 5  mg 2x daily.  At this time, I do think that patient would be benefited from sinus restoration given his young age along with cardiomyopathy.  Therefore, I will refer patient to electrophysiology for further evaluation for potential ablation procedure for more permanent treatment of his atrial fibrillation.  Patient did have failed cardioversion while he was in the hospital.  In the meantime, we will continue amiodarone therapy.  Waiting for AF ablation in 01/2023.  Continue Amiodarone 200 mg daily.    Hypertension:  Optimize control using cardiomyopathy medical regimen as well.    Hyperlipidemia:  Optimize statin as within guidelines of CAD treatment as above.     Of note, during the care of this patient, I spent a significant amount of time explaining the nature of the  disease process, reviewing all possible imaging studies, blood test results to patient.  The overall care of this patient require a higher level of care than usual due to the multiple comorbidities along with ongoing issues of congestive heart failure that put this patient at risk for sudden cardiac death, increased mortality, and hospitalization.  This patient also requires close monitoring with at least monthly blood test to monitor renal function and electrolytes due to the ongoing dynamic changes of medical therapy titration protocol to ensure optimal benefits for the overall care of this patient.    Ira Bacon M.D.

## 2023-12-21 ENCOUNTER — PRE-ADMISSION TESTING (OUTPATIENT)
Dept: ADMISSIONS | Facility: MEDICAL CENTER | Age: 58
End: 2023-12-21
Attending: INTERNAL MEDICINE
Payer: COMMERCIAL

## 2023-12-21 DIAGNOSIS — Z01.812 PRE-OPERATIVE LABORATORY EXAMINATION: ICD-10-CM

## 2023-12-21 DIAGNOSIS — Z01.810 PRE-OPERATIVE CARDIOVASCULAR EXAMINATION: ICD-10-CM

## 2024-01-11 ENCOUNTER — OFFICE VISIT (OUTPATIENT)
Dept: SLEEP MEDICINE | Facility: MEDICAL CENTER | Age: 59
End: 2024-01-11
Attending: PREVENTIVE MEDICINE
Payer: COMMERCIAL

## 2024-01-11 VITALS
SYSTOLIC BLOOD PRESSURE: 124 MMHG | HEIGHT: 72 IN | HEART RATE: 63 BPM | OXYGEN SATURATION: 94 % | WEIGHT: 226 LBS | BODY MASS INDEX: 30.61 KG/M2 | DIASTOLIC BLOOD PRESSURE: 76 MMHG

## 2024-01-11 DIAGNOSIS — G47.34 NOCTURNAL OXYGEN DESATURATION: ICD-10-CM

## 2024-01-11 DIAGNOSIS — I48.91 ATRIAL FIBRILLATION, UNSPECIFIED TYPE (HCC): ICD-10-CM

## 2024-01-11 DIAGNOSIS — G47.33 OSA (OBSTRUCTIVE SLEEP APNEA): ICD-10-CM

## 2024-01-11 DIAGNOSIS — I50.22 CHRONIC SYSTOLIC HEART FAILURE (HCC): ICD-10-CM

## 2024-01-11 PROCEDURE — 3074F SYST BP LT 130 MM HG: CPT | Performed by: PREVENTIVE MEDICINE

## 2024-01-11 PROCEDURE — 99214 OFFICE O/P EST MOD 30 MIN: CPT | Performed by: PREVENTIVE MEDICINE

## 2024-01-11 PROCEDURE — 99213 OFFICE O/P EST LOW 20 MIN: CPT | Performed by: PREVENTIVE MEDICINE

## 2024-01-11 PROCEDURE — 3078F DIAST BP <80 MM HG: CPT | Performed by: PREVENTIVE MEDICINE

## 2024-01-11 ASSESSMENT — FIBROSIS 4 INDEX: FIB4 SCORE: 1.19

## 2024-01-11 NOTE — PROGRESS NOTES
"CHIEF COMPLIANT: \"How did I do on my sleep study?\"   COLLATERAL:  Wife Deepa  LAST SEEN:  Dr. Parekh on 11/6/2023  HISTORY OF PRESENT ILLNESS:  Harjit Pantoja is a 58 y.o.male who is here for sleep study results.     Sleep study results:   TYPE:PSG split night  DATE:  12/1/2023  Diagnostic:AHI:  27, REM AHI: 42, Supine AHI: 37  Diagnostic  Oxygen Ehsan: 82% with 10.5 mins at or under 88%   TREATMENT AHI: 5.7  TREATMENT Oxygen Ehsan: 78% with 3.8 mins at or under 88%   TITRATION: CPAP from 5-8 cm H2O    Significant comorbidities and modifying factors: see below     PAST MEDICAL HISTORY:  Past Medical History:   Diagnosis Date    Apnea, sleep     Atrial fibrillation (HCC)     Bronchitis     Chickenpox     Cold 12/07/2011    stomach flu 12/3/11    Congestive heart failure (HCC) 10/23    Diabetes (HCC)     Difficulty breathing     Hearing difficulty     Hyperlipidemia     Hypertension     Influenza     Shortness of breath     Snoring     Swelling of lower extremity     Wears glasses       PROBLEM LIST:  Patient Active Problem List    Diagnosis Date Noted    Heart failure (HCC) 10/17/2023    Type 2 diabetes mellitus (HCC) 10/17/2023    Has recently quit alcohol use 10/16/2023    A-fib (HCC) 10/16/2023     PAST SOCIAL HISTORY:  Past Surgical History:   Procedure Laterality Date    VENTRAL HERNIA REPAIR LAPAROSCOPIC  12/14/2011    Performed by TUYET RIDLEY at SURGERY [facility]    COLON RESECTION      OTHER ABDOMINAL SURGERY      repair of diverticulitis; colon resection     PAST FAMILY HISTORY:  Family History   Problem Relation Age of Onset    Lung Cancer Mother         Diagnosed with lung cancer this past summer    Cancer Mother         Lung cancer    Prostate cancer Father         Passed away prostate cancer and heart problems    Cancer Father         Prostate cancer    Diabetes Father     Heart Disease Father     Hypertension Father     Hyperlipidemia Father     Alcohol abuse Father      SOCIAL " HISTORY:  Social History     Socioeconomic History    Marital status:      Spouse name: Not on file    Number of children: Not on file    Years of education: Not on file    Highest education level: Not on file   Occupational History    Not on file   Tobacco Use    Smoking status: Every Day     Current packs/day: 0.00     Average packs/day: 1 pack/day for 32.0 years (32.0 ttl pk-yrs)     Types: Cigarettes     Start date: 2001     Last attempt to quit: 10/22/2023     Years since quittin.2    Smokeless tobacco: Current    Tobacco comments:     Smoked a pack of day since 36. Quit three weeks ago and have had a few cigarettes the last few days   Vaping Use    Vaping Use: Former    Quit date: 2019   Substance and Sexual Activity    Alcohol use: Not Currently     Alcohol/week: 2.4 oz     Types: 4 Standard drinks or equivalent per week     Comment: Was an every night heavy beer drinker until a month ago.    Drug use: Yes     Types: Oral     Comment: one every two weeks.   marijuana    Sexual activity: Not on file   Other Topics Concern    Not on file   Social History Narrative    Not on file     Social Determinants of Health     Financial Resource Strain: Not on file   Food Insecurity: Not on file   Transportation Needs: Not on file   Physical Activity: Not on file   Stress: Not on file   Social Connections: Not on file   Intimate Partner Violence: Not on file   Housing Stability: Not on file     ALLERGIES: Cleocin [clindamycin hcl] and Pcn [penicillins]  MEDICATIONS:  Current Outpatient Medications   Medication Sig Dispense Refill    sacubitril-valsartan (ENTRESTO)  MG Tab Take 1 Tablet by mouth 2 times a day. 180 Tablet 4    metoprolol (TOPROL XL) 200 MG XL tablet Take 1 Tablet by mouth every day. 90 Tablet 1    dapagliflozin propanediol (FARXIGA) 10 MG Tab Take 1 Tablet by mouth every day. 90 Tablet 1    spironolactone (ALDACTONE) 25 MG Tab Take 1 Tablet by mouth every day. 90 Tablet 1     "furosemide (LASIX) 40 MG Tab Take 1 Tablet by mouth every day. 90 Tablet 1    apixaban (ELIQUIS) 5mg Tab Take 1 Tablet by mouth 2 times a day. 180 Tablet 1    amiodarone (CORDARONE) 200 MG Tab Take 1 Tablet by mouth every day. 30 Tablet 11    metFORMIN ER (GLUCOPHAGE XR) 500 MG TABLET SR 24 HR Take 1,000 mg by mouth 2 times a day. 1,000 mg = 2 tabs      rosuvastatin (CRESTOR) 10 MG Tab Take 10 mg by mouth every day.      Probiotic Product (PROBIOTIC PO) Take 1 Tablet by mouth every day.       No current facility-administered medications for this visit.    \"CURRENT RX\"    REVIEW OF SYSTEMS:  Constitutional: Denies weight loss, endorses chronic daytime fatigue.  See HPI      PHYSICAL EXAM/VITALS:  /76 (BP Location: Left arm, Patient Position: Sitting, BP Cuff Size: Adult)   Pulse 63   Ht 1.829 m (6')   Wt 103 kg (226 lb)   SpO2 94%   BMI 30.65 kg/m²   Appearance: Well-nourished, well-developed,  looks stated age, no acute distress  Eyes:   EOMI  ENMT:  WNL  Neck: Supple, trachea midline  Respiratory effort:  No intercostal retractions or use of accessory muscles  Musculoskeletal:  Grossly normal; gait and station normal  Neurologic:  oriented to person, time, place, and purpose; judgement intact  Psychiatric:  No depression, anxiety, agitation      MEDICAL DECISION MAKING:  The medical record was reviewed as it pertains to this referral. This includes records from primary care,consultants notes, referral request, hospital records, labs and imaging. Any available diagnostic and titration nocturnal polysomnograms, home sleep apnea tests, continuous nocturnal oximetry results, multiple sleep latency tests, and recent compliance reports were reviewed with the patient.    ASSESSMENT/PLAN:  Harjit Pantoja is a 58 y.o.male who has moderate to severe JENNIFER and mild nocturnal oxygen desaturation.  This patient will do well on a ResMed APAP with starting pressures of min 7 and max 11 cm H2O.  This patient will " also need to meet all insurance requirements for compliance.      DIAGNOSES :    1. JENNIFER (obstructive sleep apnea)  - DME Mask Fitting; Future  - DME CPAP    2. Nocturnal oxygen desaturation  - DME Mask Fitting; Future  - DME CPAP    3. Chronic systolic heart failure (HCC)  - DME Mask Fitting; Future  - DME CPAP    4. Atrial fibrillation, unspecified type (HCC)  - DME Mask Fitting; Future  - DME CPAP      MEETING INSURANCE COMPLIANCE REQUIREMENTS and MISC tips:    The patient has 90 days in order to be deemed compliant by the insurance company.  The 90-day starts the day that the patient receives the machine and supplies from the DME.  It is during this period of time that the patient must demonstrate a consistent use of pap (greater than 4 hours/day for a minimum of 23 days out of 30).  The patient is aware that  PAP usage ( time) will be added up--  together over a 24-hour period.  This simply means that the patient does not have to use the machine for 4 hours in a row and the patient does not have to be asleep for the minutes to count towards the required 4 hours.     It is recommended to the patient that the patient begin Pap therapy by first just wearing the mask and headgear loosely applied to the face for 20 minutes a day for the first 2-3 days.  Then  the patient may begin using the mask and headgear with the machine to get a feel for using the mask with a good seal This should be accomplished by using the entire PAP set up with the machine on for 20 to 30 minute(the ramp time) while the patient is awake.  Try this for 2 days.  These usage exercises will  allow the face and brain to get accustomed to mask, headgear and air pressures.  Now the patient can begin to use the Pap therapy for sleep.     Also  the patient is instructed to keep the machine located slightly ( 6-12 ins)  below the level of the mattress.  This allows for proper humidification of the air before it reaches nasal passages and prevents  inhaling water droplets.      Cleaning the mask, headgear, tubing, water reservoir is to be done using hot water and a gentle detergent once a week.        PAP THERAPY  EQUIPMENT AND SUPPLIES SCHEDULE    Mask cushion every month  Nasal pillows 2 times per month  Mask every 6 months  Head gear every 6 months  Tubing every 3 months  Ultra-fine filters 2 times per month  Foam filter every 6 months  Humidifier chamber every 6 months     The risks of untreated sleep apnea were discussed with the patient at length. Patients with JENNIFER are at increased risk of cardiovascular disease including coronary artery disease, systemic arterial hypertension, pulmonary arterial hypertension, cardiac arrhythmias, and stroke. JENNIFER patients have an increased risk of motor vehicle accidents, type 2 diabetes, chronic kidney disease, and non-alcoholic liver disease. The patient was advised to avoid driving a motor vehicle when drowsy.  Have advised the patient to follow up with the appropriate healthcare practitioners for all other medical problems and issues.    RETURN TO CLINIC: Return in about 10 weeks (around 3/21/2024) for Compliance check.    My total time spent caring for the patient on the day of the encounter was 40 minutes. This includes time spent on a thorough chart review including other physician notes, all sleep studies, as well as critical labs and pulmonary and cardiac studies.  Additionally, it includes a thorough discussion of good sleep hygiene and stimulus control, as well as  the need for consistency in terms of sleep preparation and practice.    Please note that this dictation was created using voice recognition software.  I have made every reasonable attempt to correct obvious errors, I expect that there are errors of grammar and possibly content that I did not discover before finalizing this note.

## 2024-01-12 ENCOUNTER — PRE-ADMISSION TESTING (OUTPATIENT)
Dept: ADMISSIONS | Facility: MEDICAL CENTER | Age: 59
End: 2024-01-12
Attending: INTERNAL MEDICINE
Payer: COMMERCIAL

## 2024-01-12 DIAGNOSIS — Z01.812 PRE-OPERATIVE LABORATORY EXAMINATION: ICD-10-CM

## 2024-01-12 DIAGNOSIS — Z01.810 PRE-OPERATIVE CARDIOVASCULAR EXAMINATION: ICD-10-CM

## 2024-01-12 LAB
ALBUMIN SERPL BCP-MCNC: 5 G/DL (ref 3.2–4.9)
ALBUMIN/GLOB SERPL: 2 G/DL
ALP SERPL-CCNC: 59 U/L (ref 30–99)
ALT SERPL-CCNC: 37 U/L (ref 2–50)
ANION GAP SERPL CALC-SCNC: 13 MMOL/L (ref 7–16)
AST SERPL-CCNC: 22 U/L (ref 12–45)
BASOPHILS # BLD AUTO: 0.7 % (ref 0–1.8)
BASOPHILS # BLD: 0.07 K/UL (ref 0–0.12)
BILIRUB SERPL-MCNC: 0.4 MG/DL (ref 0.1–1.5)
BUN SERPL-MCNC: 55 MG/DL (ref 8–22)
CALCIUM ALBUM COR SERPL-MCNC: 8.9 MG/DL (ref 8.5–10.5)
CALCIUM SERPL-MCNC: 9.7 MG/DL (ref 8.5–10.5)
CHLORIDE SERPL-SCNC: 97 MMOL/L (ref 96–112)
CO2 SERPL-SCNC: 26 MMOL/L (ref 20–33)
CREAT SERPL-MCNC: 1.66 MG/DL (ref 0.5–1.4)
EKG IMPRESSION: NORMAL
EOSINOPHIL # BLD AUTO: 0.16 K/UL (ref 0–0.51)
EOSINOPHIL NFR BLD: 1.7 % (ref 0–6.9)
ERYTHROCYTE [DISTWIDTH] IN BLOOD BY AUTOMATED COUNT: 43.7 FL (ref 35.9–50)
EST. AVERAGE GLUCOSE BLD GHB EST-MCNC: 163 MG/DL
GFR SERPLBLD CREATININE-BSD FMLA CKD-EPI: 47 ML/MIN/1.73 M 2
GLOBULIN SER CALC-MCNC: 2.5 G/DL (ref 1.9–3.5)
GLUCOSE SERPL-MCNC: 124 MG/DL (ref 65–99)
HBA1C MFR BLD: 7.3 % (ref 4–5.6)
HCT VFR BLD AUTO: 46.1 % (ref 42–52)
HGB BLD-MCNC: 15.8 G/DL (ref 14–18)
IMM GRANULOCYTES # BLD AUTO: 0.04 K/UL (ref 0–0.11)
IMM GRANULOCYTES NFR BLD AUTO: 0.4 % (ref 0–0.9)
INR PPP: 1.18 (ref 0.87–1.13)
LYMPHOCYTES # BLD AUTO: 2.52 K/UL (ref 1–4.8)
LYMPHOCYTES NFR BLD: 26.7 % (ref 22–41)
MCH RBC QN AUTO: 29.9 PG (ref 27–33)
MCHC RBC AUTO-ENTMCNC: 34.3 G/DL (ref 32.3–36.5)
MCV RBC AUTO: 87.1 FL (ref 81.4–97.8)
MONOCYTES # BLD AUTO: 0.6 K/UL (ref 0–0.85)
MONOCYTES NFR BLD AUTO: 6.3 % (ref 0–13.4)
NEUTROPHILS # BLD AUTO: 6.06 K/UL (ref 1.82–7.42)
NEUTROPHILS NFR BLD: 64.2 % (ref 44–72)
NRBC # BLD AUTO: 0 K/UL
NRBC BLD-RTO: 0 /100 WBC (ref 0–0.2)
PLATELET # BLD AUTO: 235 K/UL (ref 164–446)
PMV BLD AUTO: 11 FL (ref 9–12.9)
POTASSIUM SERPL-SCNC: 4.6 MMOL/L (ref 3.6–5.5)
PROT SERPL-MCNC: 7.5 G/DL (ref 6–8.2)
PROTHROMBIN TIME: 15.2 SEC (ref 12–14.6)
RBC # BLD AUTO: 5.29 M/UL (ref 4.7–6.1)
SODIUM SERPL-SCNC: 136 MMOL/L (ref 135–145)
WBC # BLD AUTO: 9.5 K/UL (ref 4.8–10.8)

## 2024-01-12 PROCEDURE — 85610 PROTHROMBIN TIME: CPT

## 2024-01-12 PROCEDURE — 85025 COMPLETE CBC W/AUTO DIFF WBC: CPT

## 2024-01-12 PROCEDURE — 93010 ELECTROCARDIOGRAM REPORT: CPT | Performed by: STUDENT IN AN ORGANIZED HEALTH CARE EDUCATION/TRAINING PROGRAM

## 2024-01-12 PROCEDURE — 36415 COLL VENOUS BLD VENIPUNCTURE: CPT

## 2024-01-12 PROCEDURE — 83036 HEMOGLOBIN GLYCOSYLATED A1C: CPT

## 2024-01-12 PROCEDURE — 93005 ELECTROCARDIOGRAM TRACING: CPT

## 2024-01-12 PROCEDURE — 80053 COMPREHEN METABOLIC PANEL: CPT

## 2024-01-15 ENCOUNTER — ANESTHESIA (OUTPATIENT)
Dept: CARDIOLOGY | Facility: MEDICAL CENTER | Age: 59
End: 2024-01-15
Payer: COMMERCIAL

## 2024-01-15 ENCOUNTER — APPOINTMENT (OUTPATIENT)
Dept: CARDIOLOGY | Facility: MEDICAL CENTER | Age: 59
End: 2024-01-15
Attending: INTERNAL MEDICINE
Payer: COMMERCIAL

## 2024-01-15 ENCOUNTER — ANESTHESIA EVENT (OUTPATIENT)
Dept: CARDIOLOGY | Facility: MEDICAL CENTER | Age: 59
End: 2024-01-15
Payer: COMMERCIAL

## 2024-01-15 ENCOUNTER — HOSPITAL ENCOUNTER (OUTPATIENT)
Facility: MEDICAL CENTER | Age: 59
End: 2024-01-15
Attending: INTERNAL MEDICINE | Admitting: INTERNAL MEDICINE
Payer: COMMERCIAL

## 2024-01-15 VITALS
SYSTOLIC BLOOD PRESSURE: 122 MMHG | OXYGEN SATURATION: 94 % | TEMPERATURE: 97.3 F | DIASTOLIC BLOOD PRESSURE: 72 MMHG | WEIGHT: 224.21 LBS | BODY MASS INDEX: 30.37 KG/M2 | HEART RATE: 73 BPM | HEIGHT: 72 IN | RESPIRATION RATE: 18 BRPM

## 2024-01-15 DIAGNOSIS — Z98.890 H/O CARDIAC RADIOFREQUENCY ABLATION: ICD-10-CM

## 2024-01-15 DIAGNOSIS — I48.19 PERSISTENT ATRIAL FIBRILLATION (HCC): ICD-10-CM

## 2024-01-15 LAB
ACT BLD: 347 SEC (ref 74–137)
EKG IMPRESSION: NORMAL
GLUCOSE BLD STRIP.AUTO-MCNC: 138 MG/DL (ref 65–99)
LV EJECT FRACT  99904: 45

## 2024-01-15 PROCEDURE — 160035 HCHG PACU - 1ST 60 MINS PHASE I

## 2024-01-15 PROCEDURE — 93005 ELECTROCARDIOGRAM TRACING: CPT | Performed by: INTERNAL MEDICINE

## 2024-01-15 PROCEDURE — 160002 HCHG RECOVERY MINUTES (STAT)

## 2024-01-15 PROCEDURE — 160036 HCHG PACU - EA ADDL 30 MINS PHASE I

## 2024-01-15 PROCEDURE — 700101 HCHG RX REV CODE 250: Performed by: ANESTHESIOLOGY

## 2024-01-15 PROCEDURE — 85347 COAGULATION TIME ACTIVATED: CPT

## 2024-01-15 PROCEDURE — 93623 PRGRMD STIMJ&PACG IV RX NFS: CPT

## 2024-01-15 PROCEDURE — 93623 PRGRMD STIMJ&PACG IV RX NFS: CPT | Mod: 26 | Performed by: INTERNAL MEDICINE

## 2024-01-15 PROCEDURE — 700111 HCHG RX REV CODE 636 W/ 250 OVERRIDE (IP): Performed by: ANESTHESIOLOGY

## 2024-01-15 PROCEDURE — 93325 DOPPLER ECHO COLOR FLOW MAPG: CPT

## 2024-01-15 PROCEDURE — 93010 ELECTROCARDIOGRAM REPORT: CPT | Performed by: INTERNAL MEDICINE

## 2024-01-15 PROCEDURE — 93657 TX L/R ATRIAL FIB ADDL: CPT | Performed by: INTERNAL MEDICINE

## 2024-01-15 PROCEDURE — 700105 HCHG RX REV CODE 258: Performed by: INTERNAL MEDICINE

## 2024-01-15 PROCEDURE — 93656 COMPRE EP EVAL ABLTJ ATR FIB: CPT | Performed by: INTERNAL MEDICINE

## 2024-01-15 PROCEDURE — 82962 GLUCOSE BLOOD TEST: CPT

## 2024-01-15 PROCEDURE — 93655 ICAR CATH ABLTJ DSCRT ARRHYT: CPT | Performed by: INTERNAL MEDICINE

## 2024-01-15 PROCEDURE — 160046 HCHG PACU - 1ST 60 MINS PHASE II

## 2024-01-15 PROCEDURE — 700111 HCHG RX REV CODE 636 W/ 250 OVERRIDE (IP)

## 2024-01-15 PROCEDURE — 700101 HCHG RX REV CODE 250

## 2024-01-15 RX ORDER — OXYCODONE HCL 5 MG/5 ML
10 SOLUTION, ORAL ORAL
Status: DISCONTINUED | OUTPATIENT
Start: 2024-01-15 | End: 2024-01-15 | Stop reason: HOSPADM

## 2024-01-15 RX ORDER — HYDROMORPHONE HYDROCHLORIDE 1 MG/ML
0.2 INJECTION, SOLUTION INTRAMUSCULAR; INTRAVENOUS; SUBCUTANEOUS
Status: DISCONTINUED | OUTPATIENT
Start: 2024-01-15 | End: 2024-01-15 | Stop reason: HOSPADM

## 2024-01-15 RX ORDER — BUPIVACAINE HYDROCHLORIDE 5 MG/ML
INJECTION, SOLUTION EPIDURAL; INTRACAUDAL
Status: COMPLETED
Start: 2024-01-15 | End: 2024-01-15

## 2024-01-15 RX ORDER — HYDROMORPHONE HYDROCHLORIDE 1 MG/ML
0.4 INJECTION, SOLUTION INTRAMUSCULAR; INTRAVENOUS; SUBCUTANEOUS
Status: DISCONTINUED | OUTPATIENT
Start: 2024-01-15 | End: 2024-01-15 | Stop reason: HOSPADM

## 2024-01-15 RX ORDER — DIPHENHYDRAMINE HYDROCHLORIDE 50 MG/ML
12.5 INJECTION INTRAMUSCULAR; INTRAVENOUS
Status: DISCONTINUED | OUTPATIENT
Start: 2024-01-15 | End: 2024-01-15 | Stop reason: HOSPADM

## 2024-01-15 RX ORDER — OMEPRAZOLE 20 MG/1
20 CAPSULE, DELAYED RELEASE ORAL
Qty: 30 CAPSULE | Refills: 0 | Status: SHIPPED | OUTPATIENT
Start: 2024-01-15

## 2024-01-15 RX ORDER — LIDOCAINE HYDROCHLORIDE 20 MG/ML
INJECTION, SOLUTION INFILTRATION; PERINEURAL
Status: COMPLETED
Start: 2024-01-15 | End: 2024-01-15

## 2024-01-15 RX ORDER — ALCOHOL 1 ML/ML
10 INJECTION, SOLUTION PERCUTANEOUS ONCE
Status: DISCONTINUED | OUTPATIENT
Start: 2024-01-15 | End: 2024-01-15 | Stop reason: HOSPADM

## 2024-01-15 RX ORDER — SODIUM CHLORIDE, SODIUM LACTATE, POTASSIUM CHLORIDE, CALCIUM CHLORIDE 600; 310; 30; 20 MG/100ML; MG/100ML; MG/100ML; MG/100ML
INJECTION, SOLUTION INTRAVENOUS CONTINUOUS
Status: ACTIVE | OUTPATIENT
Start: 2024-01-15 | End: 2024-01-15

## 2024-01-15 RX ORDER — MEPERIDINE HYDROCHLORIDE 25 MG/ML
12.5 INJECTION INTRAMUSCULAR; INTRAVENOUS; SUBCUTANEOUS
Status: DISCONTINUED | OUTPATIENT
Start: 2024-01-15 | End: 2024-01-15 | Stop reason: HOSPADM

## 2024-01-15 RX ORDER — ONDANSETRON 2 MG/ML
INJECTION INTRAMUSCULAR; INTRAVENOUS PRN
Status: DISCONTINUED | OUTPATIENT
Start: 2024-01-15 | End: 2024-01-15 | Stop reason: SURG

## 2024-01-15 RX ORDER — HYDROMORPHONE HYDROCHLORIDE 1 MG/ML
0.1 INJECTION, SOLUTION INTRAMUSCULAR; INTRAVENOUS; SUBCUTANEOUS
Status: DISCONTINUED | OUTPATIENT
Start: 2024-01-15 | End: 2024-01-15 | Stop reason: HOSPADM

## 2024-01-15 RX ORDER — PROTAMINE SULFATE 10 MG/ML
INJECTION, SOLUTION INTRAVENOUS
Status: COMPLETED
Start: 2024-01-15 | End: 2024-01-15

## 2024-01-15 RX ORDER — OXYCODONE HCL 5 MG/5 ML
5 SOLUTION, ORAL ORAL
Status: DISCONTINUED | OUTPATIENT
Start: 2024-01-15 | End: 2024-01-15 | Stop reason: HOSPADM

## 2024-01-15 RX ORDER — HALOPERIDOL 5 MG/ML
1 INJECTION INTRAMUSCULAR
Status: DISCONTINUED | OUTPATIENT
Start: 2024-01-15 | End: 2024-01-15 | Stop reason: HOSPADM

## 2024-01-15 RX ORDER — HEPARIN SODIUM 1000 [USP'U]/ML
INJECTION, SOLUTION INTRAVENOUS; SUBCUTANEOUS
Status: COMPLETED
Start: 2024-01-15 | End: 2024-01-15

## 2024-01-15 RX ORDER — HEPARIN SODIUM 200 [USP'U]/100ML
INJECTION, SOLUTION INTRAVENOUS
Status: COMPLETED
Start: 2024-01-15 | End: 2024-01-15

## 2024-01-15 RX ORDER — ONDANSETRON 2 MG/ML
4 INJECTION INTRAMUSCULAR; INTRAVENOUS
Status: DISCONTINUED | OUTPATIENT
Start: 2024-01-15 | End: 2024-01-15 | Stop reason: HOSPADM

## 2024-01-15 RX ORDER — DEXAMETHASONE SODIUM PHOSPHATE 4 MG/ML
INJECTION, SOLUTION INTRA-ARTICULAR; INTRALESIONAL; INTRAMUSCULAR; INTRAVENOUS; SOFT TISSUE PRN
Status: DISCONTINUED | OUTPATIENT
Start: 2024-01-15 | End: 2024-01-15 | Stop reason: SURG

## 2024-01-15 RX ORDER — ISOPROTERENOL HYDROCHLORIDE 0.2 MG/ML
INJECTION, SOLUTION INTRAVENOUS
Status: COMPLETED
Start: 2024-01-15 | End: 2024-01-15

## 2024-01-15 RX ORDER — MIDAZOLAM HYDROCHLORIDE 1 MG/ML
INJECTION INTRAMUSCULAR; INTRAVENOUS
Status: COMPLETED
Start: 2024-01-15 | End: 2024-01-15

## 2024-01-15 RX ADMIN — ONDANSETRON 4 MG: 2 INJECTION INTRAMUSCULAR; INTRAVENOUS at 10:27

## 2024-01-15 RX ADMIN — HEPARIN SODIUM 15000 UNITS: 1000 INJECTION, SOLUTION INTRAVENOUS; SUBCUTANEOUS at 10:43

## 2024-01-15 RX ADMIN — MIDAZOLAM HYDROCHLORIDE 2 MG: 2 INJECTION, SOLUTION INTRAMUSCULAR; INTRAVENOUS at 10:08

## 2024-01-15 RX ADMIN — PROTAMINE SULFATE 50 MG: 10 INJECTION, SOLUTION INTRAVENOUS at 11:27

## 2024-01-15 RX ADMIN — PROPOFOL 150 MG: 10 INJECTION, EMULSION INTRAVENOUS at 10:13

## 2024-01-15 RX ADMIN — DEXAMETHASONE SODIUM PHOSPHATE 8 MG: 4 INJECTION INTRA-ARTICULAR; INTRALESIONAL; INTRAMUSCULAR; INTRAVENOUS; SOFT TISSUE at 10:27

## 2024-01-15 RX ADMIN — BUPIVACAINE HYDROCHLORIDE: 5 INJECTION, SOLUTION EPIDURAL; INTRACAUDAL at 10:07

## 2024-01-15 RX ADMIN — SUGAMMADEX 200 MG: 100 INJECTION, SOLUTION INTRAVENOUS at 11:29

## 2024-01-15 RX ADMIN — LIDOCAINE HYDROCHLORIDE: 20 INJECTION, SOLUTION INFILTRATION; PERINEURAL at 10:07

## 2024-01-15 RX ADMIN — HEPARIN SODIUM 6000 UNITS: 200 INJECTION, SOLUTION INTRAVENOUS at 10:08

## 2024-01-15 RX ADMIN — SODIUM CHLORIDE, POTASSIUM CHLORIDE, SODIUM LACTATE AND CALCIUM CHLORIDE: 600; 310; 30; 20 INJECTION, SOLUTION INTRAVENOUS at 10:02

## 2024-01-15 RX ADMIN — ISOPROTERENOL HYDROCHLORIDE 0.2 MG: 0.2 INJECTION, SOLUTION INTRACARDIAC; INTRAMUSCULAR; INTRAVENOUS; SUBCUTANEOUS at 11:23

## 2024-01-15 RX ADMIN — ROCURONIUM BROMIDE 100 MG: 10 INJECTION, SOLUTION INTRAVENOUS at 10:13

## 2024-01-15 ASSESSMENT — PAIN DESCRIPTION - PAIN TYPE
TYPE: SURGICAL PAIN

## 2024-01-15 ASSESSMENT — FIBROSIS 4 INDEX: FIB4 SCORE: 0.89

## 2024-01-15 NOTE — OP REPORT
Electrophysiology Procedure Note  [facility]    Procedures Performed:  Pulmonary Vein Isolation  Additional ablation for atrial fibrillation  Ablation of additional arrhythmia  Intracardiac Echocardiography  Three-dimensional intracardiac mapping  IV isoproterenol infusion with programmed stimulation      Electrophysiologist: Bryn Flores MD    Assistant(s): None    Anesthesia: General anesthesia was provided by Dr. Curran of the Anesthesiology service.    Statement of Medical Necessity: This is a 58  year-old male with history of symptomatic persistent atrial  fibrillation     Pre-procedure ECG: SR    Post-procedure ECG: Sinus     Description of Procedure:    Access and catheter placement: After obtaining informed written consent, the patient was  brought to the EP lab in the fasting, non-sedated stated. The patient was sedated and intubated  by the anesthesiologist. The patient was prepped and draped in the usual sterile fashion. Using  the modified Seldinger technique, access was obtained in bilateral  femoral veins. Guidewires were advanced into the IVC. In the right femoral vein, 2 sheaths of   8F were placed. In the left femoral vein 2 sheaths of 8F were placed. A deflectable  decapolar catheter was advanced through the LFV to the coronary sinus. An 8F intracardiac  echo catheter was advanced to the right atrium.     At baseline typical atrial flutter was inducible TCL 300ms. This terminated prior to ablation. The CTI was targeted for ablation at 35W resulting in a line of block >190ms.    Through one of the 8F short sheaths in the RFV,  a long wire was advanced to the SVC. The short sheath was  changed out for an 8.5 F braided Tor-flex (Spectralmind) sheath which was advanced to the SVC. The wire was  removed and the dilator was flushed. A 71-cm transseptal needle (Spectralmind) was advanced to the tip of the  dilator, and the obturator was removed. The transseptal needle was attached to the  manifold and  flushed. Under intracardiac echocardiographic guidance, the sheath/needle   system was withdrawn to the fossa ovalis. At this time, 13,000 units of heparin were administered.   Additional boluses of heparin were given as needed to keep -350 sec during LA dwell time.   The needle was advanced out of the dilator, and RF energy applied via the Valley needle.   ICE visualized the needle passage through the fossa ovalis into the left  atrium. Confirmation of left atrial location was confirmed by injecting saline and transducing  pressure. The dilator was advanced over the needle into the left atrium, and then the sheath was advanced over the dilator. The dilator and  needle were removed. The sheath was flushed and connected to a continuous heparinized   saline drip.  The transseptal catheterization  procedure was repeated through the second RFV access site a 98-cm transseptal needle  and a medium-curl Vizigo (Biosense Alford) sheath. Left atrial location was again confirmed by injecting saline  and transducing pressure. The needle and dilator were removed. The sheath was attached to  the drip line and flushed.     A 3.5-mm externally-irrigated ablation catheter (Biosense-Alford   Thermocool ST SF DF-curve) was advanced through the Vizigo sheath into the left atrium. A duodecapolar Penta-Ray catheter (Biosense-Alford) was  advanced through the Tor-flex sheath into the left atrium. A 3-dimensional electroanatomical  map was constructed using the CARTO system.    Attention was first turned to the left pulmonary veins. The Penta-Ray  catheter was placed in the LSPV and LIPV which showed conduction into the vein. A circumferential  ablation line using radiofrequency energy 20-40W was placed which resulted in LSPV and LIPV isolation.  The catheters were then moved to the RSPV and RIPV which showed evidence of conduction into the veins,   and ablation 20-40W was performed circumferentially around these  veins   resulting in RSPV and RIPV isolation. Power was reduced near the esophagus.  The Penta-Ray was advanced into all four pulmonary veins and persistent conduction block into the right and left pulmonary veins was demonstrated.     The posterior wall was evaluated as an additional target for treatment of atrial fibrillation, and posterior wall isolation was performed with roof and floor lines, 20-40W, resulting in isolation of the posterior wall.     Isuprel 2-4mcg/min was administered and burst pacing was performed in the left atrium without induction of sustained AF, AFL, or SVT.     The left atrial sheaths and catheters were withdrawn to the right atrium. The CTI remained blocked    ICE visualization   of the pericardium confirmed no pericardial effusion at the end of the case. The  patient was awakened from anesthesia, catheters and sheaths were removed, Vascade MVP closure devices were deployed, and manual  pressure was held on bilateral groins until hemostasis was achieved.    Electrophysiological Findings:  Sinus cycle length 1023 msec  Intervals-  H-V 65 msec   msec   msec   msec  AV block  ms  AVERP: 550/340/300 ms    Total RF time: 1131 sec  Fluoro time: 0 min      Estimated blood loss - 30 mL    Complications: None    Impression:  1. Atrial fibrillation, persistent  2. Successful isolation of all four pulmonary veins  3. Successful ablation of LA roof and floor with Posterior wall isolation for additional ablation target of atrial fibrillation   4. Typical atrial flutter with successful CTI ablation     Recommendations:  1. Bed rest for 2 hours  2. Telemetry monitoring during recovery  3. Anticoagulant therapy for at least 2 months  4. Follow up in Arrhythmia clinic in 4 weeks  5. With recovery in LV function in sinus rhythm consider de-escalation/discontinuation of heart failure medications  6. Stop amiodarone after 3-4 weeks      Bryn Flores MD  Cardiac  Electrophysiology

## 2024-01-15 NOTE — OR NURSING
1320 APRN at bedside assessing patient.     1330 Pt up to void.     1350 Right groin dressing lightly saturated after getting up to restroom. Dressing replaced and pt placed back into gurney with sandbag pressure.     1400 Right groin site CDI.     1445 Bilateral groin sites CDI. Handoff to Stan HERNANDEZ.

## 2024-01-15 NOTE — ANESTHESIA PROCEDURE NOTES
Airway    Date/Time: 1/15/2024 10:13 AM    Performed by: Meir Curran M.D.  Authorized by: Meir Curran M.D.    Location:  OR  Urgency:  Elective  Indications for Airway Management:  Anesthesia      Spontaneous Ventilation: absent    Sedation Level:  Deep  Preoxygenated: Yes    Patient Position:  Sniffing  Final Airway Type:  Endotracheal airway  Final Endotracheal Airway:  ETT  Cuffed: Yes    Technique Used for Successful ETT Placement:  Direct laryngoscopy    Insertion Site:  Oral  Blade Type:  Jimmy  Laryngoscope Blade/Videolaryngoscope Blade Size:  3  ETT Size (mm):  7.5  Measured from:  Teeth  ETT to Teeth (cm):  21  Placement Verified by: auscultation and capnometry    Cormack-Lehane Classification:  Grade IIa - partial view of glottis  Number of Attempts at Approach:  1

## 2024-01-15 NOTE — H&P
[facility]  Electrophysiology Pre-procedure H&P    DOS:1/15/2024    Planned Procedure: AF ablation    Chief complaint/Reason for Procedure: Afib    HPI: 59 y/o M with persistent Afib for ablation      Past Medical History:   Diagnosis Date    Apnea, sleep     Atrial fibrillation (HCC)     Bronchitis     Chickenpox     Cold 2011    stomach flu 12/3/11    Congestive heart failure (HCC) 10/23    Diabetes (HCC)     Difficulty breathing     Hearing difficulty     Hyperlipidemia     Hypertension     Influenza     Shortness of breath     Snoring     Swelling of lower extremity     Wears glasses        Past Surgical History:   Procedure Laterality Date    VENTRAL HERNIA REPAIR LAPAROSCOPIC  2011    Performed by TUYET RIDLEY at [facility] ORS    COLON RESECTION      OTHER ABDOMINAL SURGERY      repair of diverticulitis; colon resection       Social History     Socioeconomic History    Marital status:      Spouse name: Not on file    Number of children: Not on file    Years of education: Not on file    Highest education level: Not on file   Occupational History    Not on file   Tobacco Use    Smoking status: Every Day     Current packs/day: 0.00     Average packs/day: 1 pack/day for 32.0 years (32.0 ttl pk-yrs)     Types: Cigarettes     Start date: 2001     Last attempt to quit: 10/22/2023     Years since quittin.2    Smokeless tobacco: Current    Tobacco comments:     Smoked a pack of day since 36. Quit three weeks ago and have had a few cigarettes the last few days   Vaping Use    Vaping Use: Former    Quit date: 2019   Substance and Sexual Activity    Alcohol use: Not Currently     Alcohol/week: 2.4 oz     Types: 4 Standard drinks or equivalent per week     Comment: Was an every night heavy beer drinker until a month ago.    Drug use: Yes     Types: Oral     Comment: one every two weeks.   marijuana    Sexual activity: Not on file   Other Topics Concern    Not  on file   Social History Narrative    Not on file     Social Determinants of Health     Financial Resource Strain: Not on file   Food Insecurity: Not on file   Transportation Needs: Not on file   Physical Activity: Not on file   Stress: Not on file   Social Connections: Not on file   Intimate Partner Violence: Not on file   Housing Stability: Not on file       Family History   Problem Relation Age of Onset    Lung Cancer Mother         Diagnosed with lung cancer this past summer    Cancer Mother         Lung cancer    Prostate cancer Father         Passed away prostate cancer and heart problems    Cancer Father         Prostate cancer    Diabetes Father     Heart Disease Father     Hypertension Father     Hyperlipidemia Father     Alcohol abuse Father        Allergies   Allergen Reactions    Cleocin [Clindamycin Hcl] Hives    Pcn [Penicillins] Hives       Current Facility-Administered Medications   Medication Dose Route Frequency Provider Last Rate Last Admin    lactated ringers infusion   Intravenous Continuous Brny Flores M.D.        BUPIVACAINE HCL (PF) 0.5 % INJ SOLN             HEPARIN SODIUM (PORCINE) 1000 UNIT/ML INJ SOLN             LIDOCAINE HCL 2 % INJ SOLN             HEPARIN (PORCINE) IN NACL 2000-0.9 UNIT/L-% IV SOLN             dehydrated alcohol 99 % (Alblysinol) injection 10 mL  10 mL Intravenous Once Bryn Flores M.D.           Physical Exam:  Vitals:    01/15/24 0745   BP: 97/54   Pulse: 76   Resp: 16   Temp: 36.3 °C (97.4 °F)   TempSrc: Temporal   SpO2: 96%   Weight: 102 kg (224 lb 3.3 oz)   Height: 1.829 m (6')     General appearance: NAD, conversant   Neck: Trachea midline; FROM, supple, no thyromegaly or lymphadenopathy  CV: RRR, no MRGs, no JVD   Extremities: No peripheral edema or extremity lymphadenopathy  Skin: Normal temperature, turgor and texture; no rash, ulcers or subcutaneous nodules  Psych: Appropriate affect, alert and oriented to person, place and time    Data:  Lab Results  "  Component Value Date/Time    CHOLSTRLTOT 116 12/08/2022 10:54 AM    LDL 35 12/08/2022 10:54 AM    HDL 69 12/08/2022 10:54 AM    TRIGLYCERIDE 58 12/08/2022 10:54 AM       Lab Results   Component Value Date/Time    SODIUM 136 01/12/2024 10:07 AM    POTASSIUM 4.6 01/12/2024 10:07 AM    CHLORIDE 97 01/12/2024 10:07 AM    CO2 26 01/12/2024 10:07 AM    GLUCOSE 124 (H) 01/12/2024 10:07 AM    BUN 55 (H) 01/12/2024 10:07 AM    CREATININE 1.66 (H) 01/12/2024 10:07 AM    CREATININE 1.0 08/28/2005 05:15 AM     Lab Results   Component Value Date/Time    ALKPHOSPHAT 59 01/12/2024 10:07 AM    ASTSGOT 22 01/12/2024 10:07 AM    ALTSGPT 37 01/12/2024 10:07 AM    TBILIRUBIN 0.4 01/12/2024 10:07 AM      No results found for: \"BNPBTYPENAT\"      Recent Labs     01/12/24  1007   WBC 9.5   RBC 5.29   HEMOGLOBIN 15.8   HEMATOCRIT 46.1   MCV 87.1   MCH 29.9   MCHC 34.3   RDW 43.7   PLATELETCT 235   MPV 11.0       EKG interpreted by me: SR    Impression/Plan:  1) Persistent afib    Plan AF ablation. We discussed pulmonary vein isolation for therapeutic management and continued rhythm control.  We discussed the risks and benefits of this procedure.  Risks include 1-3% risk of major cardiovascular event including stroke, myocardial infarction, phrenic nerve damage, esophageal injury and/or fistula formation, cardiac perforation, pericardial effusion, tamponade, major bleeding, or death.  I quoted a 70 to 80% chance free of atrial fibrillation at 12 months.  We discussed that he may also need a second procedure.        Bryn Flores MD  Cardiac Electrophysiology    "

## 2024-01-15 NOTE — ANESTHESIA PROCEDURE NOTES
FILIBERTO    Performed by: Meir Curran M.D.  Authorized by: Meir Curran M.D.    Start Time:1/15/2024 10:14 AM  Preanesthetic Checklist: patient identified, IV checked, site marked, risks and benefits discussed, surgical consent, monitors and equipment checked, pre-op evaluation and timeout performed    Indication for FILIBERTO: diagnostic     Intubated: Yes  Bite Block: Yes  Heart Visualized: Yes  Insertion: atraumatic    **See FULL FILIBERTO report in patient's chart via [facility]**

## 2024-01-15 NOTE — DISCHARGE INSTRUCTIONS
[facility] Post Ablation Patient Instructions:  No lifting > 10 lbs x 1 week.      No soaking in baths, hot tubs, pools x 1 week.  May shower the day after discharge and take off groin dressings and leave  sites uncovered.  Continue to monitor sites daily for warmth, redness, discolored drainage.  It is common to have a small lump in the area where the cather was (usually the size of a marble); this will go away but takes approximately 6 weeks to normalize.     3.   Please take all medications as prescribed to you; please do not stop any medications prescribed post ablation unless directed by your healthcare provider.      4.   Please do not miss any doses of your blood thinner (if you have been started on, or take chronic blood thinners) without discussion with your healthcare provider first.     5.   Please walk and take deep breaths after discharge.  After discharge, if you experience neurological changes/signs of stroke or high fever you should be seen in the emergency dept.     6.   It is possible you may experience some chest discomfort or chest tightness post ablation.  This is usually secondary to inflammation and irritation of the tissues at the area of the ablation.  If this occurs, it is advised to try 400 mg of Ibuprofen with food as needed up to three times a day for a maximum of two days.  This should help to decrease pain and tissue inflammation.          **Please notify the office ([phone]) if this occurs.         ** DO NOT TAKE Ibuprofen IF HISTORY OF ALLERGY, SIGNIFICANT BLEEDING OR KIDNEY DISEASE WITHOUT DISCUSSING WITH YOUR CARDIOLOGY PROVIDER FIRST.          ** If pain becomes severe or you have additional symptoms you may need to be medically evaluated; please contact the cardiology office ([phone]) for further direction.     7. It is possible that you may experience arrhythmia/Atrial Fibrillation post ablation.  This is secondary to irritation and  inflammation of the cardiac tissues from the ablation.  If you have atrial fibrillation all day or feel poorly with it, please notify your cardiologist's via phone ([phone]) or [employer].      8.  Please contact call our office ([phone]) or message via [employer] message if you have any questions or concerns post procedurally.    9. You need to be seen for post ablation follow up 3-4 weeks post procedure. An appointment is scheduled for you.  Please contact the office ([phone]) if you need to change your appointment.          If any questions arise, call your provider.  If your provider is not available, please feel free to call the Surgical Center at [phone].    MEDICATIONS: Resume taking daily medication.  Take prescribed pain medication with food.  If no medication is prescribed, you may take non-aspirin pain medication if needed.  PAIN MEDICATION CAN BE VERY CONSTIPATING.  Take a stool softener or laxative such as senokot, pericolace, or milk of magnesia if needed.    Last pain medication given at     What to Expect Post Anesthesia    Rest and take it easy for the first 24 hours.  A responsible adult is recommended to remain with you during that time.  It is normal to feel sleepy.  We encourage you to not do anything that requires balance, judgment or coordination.    FOR 24 HOURS DO NOT:  Drive, operate machinery or run household appliances.  Drink beer or alcoholic beverages.  Make important decisions or sign legal documents.    To avoid nausea, slowly advance diet as tolerated, avoiding spicy or greasy foods for the first day.  Add more substantial food to your diet according to your provider's instructions.  Babies can be fed formula or breast milk as soon as they are hungry.  INCREASE FLUIDS AND FIBER TO AVOID CONSTIPATION.    MILD FLU-LIKE SYMPTOMS ARE NORMAL.  YOU MAY EXPERIENCE GENERALIZED MUSCLE ACHES, THROAT IRRITATION, HEADACHE AND/OR SOME NAUSEA.

## 2024-01-15 NOTE — ANESTHESIA PROCEDURE NOTES
Arterial Line    Performed by: Meir Curran M.D.  Authorized by: Meir Curran M.D.    Start Time:  1/15/2024 10:10 AM  Localization: surface landmarks    Patient Location:  OR  Indication: continuous blood pressure monitoring        Catheter Size:  20 G  Seldinger Technique?: Yes    Site:  Radial artery  Line Secured:  Antimicrobial disc, tape and transparent dressing  Events: patient tolerated procedure well with no complications

## 2024-01-15 NOTE — ANESTHESIA PREPROCEDURE EVALUATION
Date/Time: 01/15/24 1000    Scheduled providers: Bryn Flores M.D.; Meir Curran M.D.    Procedure: CL-EP ABLATION ATRIAL FIBRILLATION    Diagnosis:       Persistent atrial fibrillation (HCC) [I48.19]      Other persistent atrial fibrillation [I48.19]    Indications: See Associated Dx    Location: [facility] - Cath Lab - [facility]            Relevant Problems   CARDIAC   (positive) A-fib (HCC)      ENDO   (positive) Type 2 diabetes mellitus (HCC)       Physical Exam    Airway   Mallampati: II  TM distance: >3 FB  Neck ROM: full       Cardiovascular - normal exam  Rhythm: regular  Rate: normal  (-) murmur     Dental - normal exam           Pulmonary - normal exam  Breath sounds clear to auscultation     Abdominal    Neurological - normal exam                   Anesthesia Plan    ASA 3       Plan - general               Induction: intravenous    Postoperative Plan: Postoperative administration of opioids is intended.    Pertinent diagnostic labs and testing reviewed    Informed Consent:    Anesthetic plan and risks discussed with patient.    Use of blood products discussed with: patient whom consented to blood products.

## 2024-01-15 NOTE — ANESTHESIA POSTPROCEDURE EVALUATION
Patient: Harjit Pantoja    Procedure Summary       Date: 01/15/24 Room / Location: [facility] Cath Lab [facility]    Anesthesia Start: 1002 Anesthesia Stop: 1147    Procedure: CL-EP ABLATION ATRIAL FIBRILLATION Diagnosis:       Persistent atrial fibrillation (HCC)      Other persistent atrial fibrillation      (See Associated Dx)    Scheduled Providers: Bryn Flores M.D.; Meir Curran M.D. Responsible Provider: Meir Curran M.D.    Anesthesia Type: general ASA Status: 3            Final Anesthesia Type: general  Last vitals  BP   Blood Pressure: (!) 84/50    Temp   36.8 °C (98.2 °F)    Pulse   62   Resp   16    SpO2   93 %      Anesthesia Post Evaluation    Patient location during evaluation: PACU  Patient participation: complete - patient participated  Level of consciousness: awake and alert    Airway patency: patent  Anesthetic complications: no  Cardiovascular status: hemodynamically stable  Respiratory status: acceptable  Hydration status: euvolemic    PONV: none          No notable events documented.     Nurse Pain Score: 0 (NPRS)

## 2024-01-15 NOTE — OR NURSING
1445 Report received. Assumed patient care at this time.    1521 APN Ambers at the bedside talking to patient and family.     1531 Escorted via w/c to responsible adult with all personal belongings.

## 2024-01-15 NOTE — ANESTHESIA TIME REPORT
Anesthesia Start and Stop Event Times       Date Time Event    1/15/2024 0954 Ready for Procedure     1002 Anesthesia Start     1147 Anesthesia Stop          Responsible Staff  01/15/24      Name Role Begin End    Meir Curran M.D. Anesth 1002 1147          Overtime Reason:  no overtime (within assigned shift)    Comments:

## 2024-01-15 NOTE — OR NURSING
Assume care for patient in pre-op. Patient allergies, surgical procedure and NPO status verified. PIV started and IVF infusing. Belongings secured in the locker. Call light within reach.

## 2024-01-16 NOTE — PROGRESS NOTES
Seen in afternoon same day discharge EP rounds.  S/P ablation of persistent Atrial fibrillation with PVI, LA roof, PWI and RA CTI for typical atrial flutter.         Conclusions per Dr Flores's Op Note dated 1/15/24:  Electrophysiological Findings:  Sinus cycle length 1023 msec  Intervals-  H-V 65 msec   msec   msec   msec  AV block  ms  AVERP: 550/340/300 ms  Total RF time: 1131 sec  Fluoro time: 0 min  Impression:  1. Atrial fibrillation, persistent  2. Successful isolation of all four pulmonary veins  3. Successful ablation of LA roof and floor with Posterior wall isolation for additional ablation target of atrial fibrillation   4. Typical atrial flutter with successful CTI ablation   Recommendations:  1. Bed rest for 2 hours  2. Telemetry monitoring during recovery  3. Anticoagulant therapy for at least 2 months  4. Follow up in Arrhythmia clinic in 4 weeks  5. With recovery in LV function in sinus rhythm consider de-escalation/discontinuation of heart failure medications  6. Stop amiodarone after 3-4 weeks     Monitored rhythm has been sinus throughout his monitored recovery time.  Vital signs are stable. Bilateral femoral access sites are clean and dry; there is no evidence of significant ecchymosis, no hematoma, no pain at sites.  He has ambulated without problem.      I have verified that new discharge prescriptions have been sent to correct pharmacy, patient has active anticoagulation prescription, and provided education about importance of esophageal prophylaxis post ablation.      Discharge instructions discussed with patient and his wife at bedside:  [facility] Post Ablation Patient Instructions:  No lifting > 10 lbs x 1 week.    No soaking in baths, hot tubs, pools x 1 week.  May shower the day after discharge and take off groin dressings and leave uncovered.  Continue to monitor sites daily for warmth, redness, discolored drainage.  It is  common to have a small lump in the area where the cather was (usually the size of a small marble); this will go away but takes approximately 6 weeks to normalize.   3.   Please take all medications as prescribed to you; please do not stop any medications prescribed post ablation unless directed by your healthcare provider.    4.   Please do not miss any doses of your blood thinner (if you have been started on, or take chronic blood thinners) without discussion with your healthcare provider first.   5.   Please walk and take deep breaths after discharge.  After discharge, if  experiences neurological changes/signs of stroke, high fever, you should be seen in the emergency dept.   6.   It is possible you may experience some chest discomfort or chest tightness post ablation.  This is usually secondary to inflammation and irritation of the tissues at the area of the ablation.  If this occurs, it is advised to try 400 mg of Ibuprofen with food as needed up to three times a day for a maximum of two days.  This should help to decrease pain and tissue inflammation.          **Please notify the office ([phone]) if this occurs.         ** DO NOT TAKE Ibuprofen IF HISTORY OF SIGNIFICANT BLEEDING OR KIDNEY DISEASE WITHOUT DISCUSSING WITH YOUR CARDIOLOGY PROVIDER FIRST.          ** If pain becomes severe or you have additional symptoms you may need to be medically evaluated; please contact the cardiology office ([phone]) for further direction.   7. It is possible that you may experience arrhythmia/Atrial Fibrillation post ablation.  This is secondary to irritation and inflammation of the cardiac tissues from the ablation.  If you have atrial fibrillation all day or feel poorly with it, please notify your cardiologist's via phone ([phone]) or [employer].    8.  Please contact call our office ([phone]) or message via [employer] message if you have any questions or concerns post procedurally.  9. You need to be seen  for post ablation follow up 2-4 weeks post procedure.  If you do not have a follow up appointment scheduled, please call [phone] to schedule your follow up appointment.

## 2024-01-30 ENCOUNTER — HOSPITAL ENCOUNTER (OUTPATIENT)
Dept: CARDIOLOGY | Facility: MEDICAL CENTER | Age: 59
End: 2024-01-30
Attending: INTERNAL MEDICINE
Payer: COMMERCIAL

## 2024-01-30 DIAGNOSIS — I50.20 ACC/AHA STAGE C SYSTOLIC HEART FAILURE (HCC): ICD-10-CM

## 2024-01-30 DIAGNOSIS — I51.89 LEFT VENTRICULAR SYSTOLIC DYSFUNCTION, NYHA CLASS 3: ICD-10-CM

## 2024-01-30 DIAGNOSIS — R06.09 DYSPNEA ON EXERTION: ICD-10-CM

## 2024-01-30 PROCEDURE — 93306 TTE W/DOPPLER COMPLETE: CPT

## 2024-01-31 LAB
LV EJECT FRACT  99904: 69
LV EJECT FRACT MOD 2C 99903: 71.19
LV EJECT FRACT MOD 4C 99902: 64.92
LV EJECT FRACT MOD BP 99901: 68.55

## 2024-01-31 PROCEDURE — 93306 TTE W/DOPPLER COMPLETE: CPT | Mod: 26 | Performed by: INTERNAL MEDICINE

## 2024-02-05 ENCOUNTER — OFFICE VISIT (OUTPATIENT)
Dept: CARDIOLOGY | Facility: MEDICAL CENTER | Age: 59
End: 2024-02-05
Attending: INTERNAL MEDICINE
Payer: COMMERCIAL

## 2024-02-05 ENCOUNTER — TELEPHONE (OUTPATIENT)
Dept: CARDIOLOGY | Facility: MEDICAL CENTER | Age: 59
End: 2024-02-05

## 2024-02-05 VITALS
DIASTOLIC BLOOD PRESSURE: 60 MMHG | SYSTOLIC BLOOD PRESSURE: 110 MMHG | HEART RATE: 55 BPM | OXYGEN SATURATION: 96 % | HEIGHT: 72 IN | BODY MASS INDEX: 30.34 KG/M2 | RESPIRATION RATE: 17 BRPM | WEIGHT: 224 LBS

## 2024-02-05 DIAGNOSIS — I48.19 HYPERCOAGULABLE STATE DUE TO PERSISTENT ATRIAL FIBRILLATION (HCC): ICD-10-CM

## 2024-02-05 DIAGNOSIS — E11.65 TYPE 2 DIABETES MELLITUS WITH HYPERGLYCEMIA, WITHOUT LONG-TERM CURRENT USE OF INSULIN (HCC): ICD-10-CM

## 2024-02-05 DIAGNOSIS — I50.20 ACC/AHA STAGE C SYSTOLIC HEART FAILURE (HCC): ICD-10-CM

## 2024-02-05 DIAGNOSIS — I10 HTN (HYPERTENSION), MALIGNANT: ICD-10-CM

## 2024-02-05 DIAGNOSIS — Z79.899 HIGH RISK MEDICATION USE: ICD-10-CM

## 2024-02-05 DIAGNOSIS — I42.8 NON-ISCHEMIC CARDIOMYOPATHY (HCC): ICD-10-CM

## 2024-02-05 DIAGNOSIS — D68.69 HYPERCOAGULABLE STATE DUE TO PERSISTENT ATRIAL FIBRILLATION (HCC): ICD-10-CM

## 2024-02-05 DIAGNOSIS — F17.200 SMOKING: ICD-10-CM

## 2024-02-05 DIAGNOSIS — I51.89 LEFT VENTRICULAR SYSTOLIC DYSFUNCTION, NYHA CLASS 3: ICD-10-CM

## 2024-02-05 DIAGNOSIS — I48.0 PAF (PAROXYSMAL ATRIAL FIBRILLATION) (HCC): ICD-10-CM

## 2024-02-05 DIAGNOSIS — I48.19 PERSISTENT ATRIAL FIBRILLATION (HCC): ICD-10-CM

## 2024-02-05 DIAGNOSIS — N52.2 DRUG-INDUCED ERECTILE DYSFUNCTION: ICD-10-CM

## 2024-02-05 DIAGNOSIS — R06.09 DYSPNEA ON EXERTION: ICD-10-CM

## 2024-02-05 PROCEDURE — 99406 BEHAV CHNG SMOKING 3-10 MIN: CPT | Performed by: INTERNAL MEDICINE

## 2024-02-05 PROCEDURE — 3078F DIAST BP <80 MM HG: CPT | Performed by: INTERNAL MEDICINE

## 2024-02-05 PROCEDURE — 99213 OFFICE O/P EST LOW 20 MIN: CPT | Performed by: INTERNAL MEDICINE

## 2024-02-05 PROCEDURE — 99214 OFFICE O/P EST MOD 30 MIN: CPT | Performed by: INTERNAL MEDICINE

## 2024-02-05 PROCEDURE — 3074F SYST BP LT 130 MM HG: CPT | Performed by: INTERNAL MEDICINE

## 2024-02-05 PROCEDURE — 99214 OFFICE O/P EST MOD 30 MIN: CPT | Mod: 25 | Performed by: INTERNAL MEDICINE

## 2024-02-05 RX ORDER — AMIODARONE HYDROCHLORIDE 100 MG/1
100 TABLET ORAL DAILY
Qty: 100 TABLET | Refills: 4 | Status: SHIPPED | OUTPATIENT
Start: 2024-02-05 | End: 2024-02-06

## 2024-02-05 RX ORDER — SACUBITRIL AND VALSARTAN 97; 103 MG/1; MG/1
1 TABLET, FILM COATED ORAL 2 TIMES DAILY
Qty: 180 TABLET | Refills: 4 | Status: SHIPPED | OUTPATIENT
Start: 2024-02-05 | End: 2024-02-06 | Stop reason: DRUGHIGH

## 2024-02-05 RX ORDER — ROSUVASTATIN CALCIUM 10 MG/1
10 TABLET, COATED ORAL DAILY
Qty: 100 TABLET | Refills: 4 | Status: SHIPPED | OUTPATIENT
Start: 2024-02-05

## 2024-02-05 RX ORDER — SILDENAFIL 100 MG/1
100 TABLET, FILM COATED ORAL
Qty: 10 TABLET | Refills: 3 | Status: SHIPPED | OUTPATIENT
Start: 2024-02-05

## 2024-02-05 RX ORDER — SPIRONOLACTONE 25 MG/1
25 TABLET ORAL DAILY
Qty: 90 TABLET | Refills: 4 | Status: SHIPPED | OUTPATIENT
Start: 2024-02-05

## 2024-02-05 RX ORDER — DAPAGLIFLOZIN 10 MG/1
10 TABLET, FILM COATED ORAL DAILY
Qty: 90 TABLET | Refills: 4 | Status: SHIPPED | OUTPATIENT
Start: 2024-02-05

## 2024-02-05 RX ORDER — METOPROLOL SUCCINATE 200 MG/1
200 TABLET, EXTENDED RELEASE ORAL DAILY
Qty: 90 TABLET | Refills: 4 | Status: SHIPPED | OUTPATIENT
Start: 2024-02-05

## 2024-02-05 ASSESSMENT — MINNESOTA LIVING WITH HEART FAILURE QUESTIONNAIRE (MLHF)
DIFFICULTY SOCIALIZING WITH FAMILY OR FRIENDS: 0
LOSS OF SELF CONTROL IN YOUR LIFE: 2
FEELING LIKE A BURDEN TO FAMILY AND FRIENDS: 2
MAKING YOU STAY IN A HOSPITAL: 0
COSTING YOU MONEY FOR MEDICAL CARE: 2
GIVING YOU SIDE EFFECTS FROM TREATMENTS: 1
WALKING ABOUT OR CLIMBING STAIRS DIFFICULT: 0
DIFFICULTY WITH RECREATIONAL PASTIMES, SPORTS, HOBBIES: 0
MAKING YOU SHORT OF BREATH: 0
WORKING AROUND THE HOUSE OR YARD DIFFICULT: 0
SWELLING IN ANKLES OR LEGS: 0
DIFFICULTY WITH SEXUAL ACTIVITIES: 3
TOTAL_SCORE: 16
EATING LESS FOODS YOU LIKE: 3
DIFFICULTY SLEEPING WELL AT NIGHT: 0
DIFFICULTY TO CONCENTRATE OR REMEMBERING THINGS: 0
MAKING YOU WORRY: 1
HAVING TO SIT OR LIE DOWN DURING THE DAY: 0
DIFFICULTY WORKING TO EARN A LIVING: 0
DIFFICULTY GOING AWAY FROM HOME: 0
TIRED, FATIGUED OR LOW ON ENERGY: 0
MAKING YOU FEEL DEPRESSED: 2

## 2024-02-05 ASSESSMENT — ENCOUNTER SYMPTOMS
FALLS: 0
FEVER: 0
MYALGIAS: 0
SHORTNESS OF BREATH: 1
BRUISES/BLEEDS EASILY: 0
DEPRESSION: 0
DOUBLE VISION: 0
DIAPHORESIS: 0
BLURRED VISION: 0
DIZZINESS: 0
SENSORY CHANGE: 0
ABDOMINAL PAIN: 0
PALPITATIONS: 0
HEADACHES: 0
COUGH: 0
MEMORY LOSS: 0

## 2024-02-05 ASSESSMENT — FIBROSIS 4 INDEX: FIB4 SCORE: 0.89

## 2024-02-05 NOTE — TELEPHONE ENCOUNTER
Prior Authorization for Sildenafil Citrate 100MG tablets (Quantity: 10, Days: 10) has been submitted via Cover My Meds: Key ([id number])    Insurance: [place] [employer]    Will follow up in 24-48 business hours.

## 2024-02-05 NOTE — PROGRESS NOTES
Chief Complaint   Patient presents with    Follow-Up     Dx: ACC/AHA stage C systolic heart failure (HCC)         Subjective     Harjit Pantoja is a 58 y.o. male who presents today for cardiac care and evaluation in the heart failure clinic for heart failure care. Patient did have a coronary angiogram, which showed non-obstructive coronary arterial disease. The diagnosis of HF was made in 10/2023. It was presumed to be non-ischemic in natured due to ETOH abuse, new onset AF. The left ventricular systolic function was documented to be at 30%. Patient was diuresed in the hospital and discharged home with guidelines directed medical therapy. He also failed FILIBERTO cardioversion while in the hospital.    01/2023 AF ablation in 01/2024.    01/2024 I have independently interpreted and reviewed echocardiogram's actual images with patient which showed normal left ventricular systolic function. No wall motion abnormality. No evidence of pulmonary hypertension. No significant valvular disease.    Patient still gets winded with daily living activities and exertion. No symptoms at rest.    I have independently interpreted and reviewed blood tests results with patient in clinic which shows GFR of 47, K of 4.6, NT pro BNP of 263. HgbA1C of 7.3.    Patient was able to complete 365 m during his 6 minute walk test. His O2 saturation at baseline was 95% and at the end of the test, the O2 saturation was 95%. he reported 1 level of dyspnea on Jorden scale.    I have personally interpreted EKG today with patient, there is no evidence of acute coronary syndrome, no evidence of prior infarct, normal SC and QT interval, no significant conduction disease. Sinus rhythm.    Past Medical History:   Diagnosis Date    Apnea, sleep     Atrial fibrillation (HCC)     Bronchitis     Chickenpox     Cold 12/07/2011    stomach flu 12/3/11    Congestive heart failure (HCC) 10/23    Diabetes (HCC)     Difficulty breathing     Hearing difficulty      Hyperlipidemia     Hypertension     Influenza     Shortness of breath     Snoring     Swelling of lower extremity     Wears glasses      Past Surgical History:   Procedure Laterality Date    VENTRAL HERNIA REPAIR LAPAROSCOPIC  2011    Performed by TUYET RIDLEY at [facility]    COLON RESECTION      OTHER ABDOMINAL SURGERY      repair of diverticulitis; colon resection     Family History   Problem Relation Age of Onset    Lung Cancer Mother         Diagnosed with lung cancer this past summer    Cancer Mother         Lung cancer    Prostate cancer Father         Passed away prostate cancer and heart problems    Cancer Father         Prostate cancer    Diabetes Father     Heart Disease Father     Hypertension Father     Hyperlipidemia Father     Alcohol abuse Father      Social History     Socioeconomic History    Marital status:      Spouse name: Not on file    Number of children: Not on file    Years of education: Not on file    Highest education level: Not on file   Occupational History    Not on file   Tobacco Use    Smoking status: Every Day     Current packs/day: 0.00     Average packs/day: 1 pack/day for 32.0 years (32.0 ttl pk-yrs)     Types: Cigarettes     Start date: 2001     Last attempt to quit: 10/22/2023     Years since quittin.2    Smokeless tobacco: Current    Tobacco comments:     Smoked a pack of day since 36. Quit three weeks ago and have had a few cigarettes the last few days   Vaping Use    Vaping Use: Former    Quit date: 2019   Substance and Sexual Activity    Alcohol use: Not Currently     Alcohol/week: 2.4 oz     Types: 4 Standard drinks or equivalent per week     Comment: Was an every night heavy beer drinker until a month ago.    Drug use: Yes     Types: Oral     Comment: one every two weeks.   marijuana    Sexual activity: Not on file   Other Topics Concern    Not on file   Social History Narrative    Not on file     Social Determinants of Health      Financial Resource Strain: Not on file   Food Insecurity: Not on file   Transportation Needs: Not on file   Physical Activity: Not on file   Stress: Not on file   Social Connections: Not on file   Intimate Partner Violence: Not on file   Housing Stability: Not on file     Allergies   Allergen Reactions    Cleocin [Clindamycin Hcl] Hives    Pcn [Penicillins] Hives     Outpatient Encounter Medications as of 2/5/2024   Medication Sig Dispense Refill    apixaban (ELIQUIS) 5mg Tab Take 1 Tablet by mouth 2 times a day. 180 Tablet 4    dapagliflozin propanediol (FARXIGA) 10 MG Tab Take 1 Tablet by mouth every day. 90 Tablet 4    metoprolol (TOPROL XL) 200 MG XL tablet Take 1 Tablet by mouth every day. 90 Tablet 4    rosuvastatin (CRESTOR) 10 MG Tab Take 1 Tablet by mouth every day. 100 Tablet 4    sacubitril-valsartan (ENTRESTO)  MG Tab Take 1 Tablet by mouth 2 times a day. 180 Tablet 4    spironolactone (ALDACTONE) 25 MG Tab Take 1 Tablet by mouth every day. 90 Tablet 4    amiodarone (CORDARONE) 100 MG tablet Take 1 Tablet by mouth every day. 100 Tablet 4    sildenafil citrate (VIAGRA) 100 MG tablet Take 1 Tablet by mouth 1 time a day as needed for Erectile Dysfunction. 10 Tablet 3    omeprazole (PRILOSEC) 20 MG delayed-release capsule Take 1 Capsule by mouth every morning before breakfast. 30 Capsule 0    furosemide (LASIX) 40 MG Tab Take 1 Tablet by mouth every day. 90 Tablet 1    metFORMIN ER (GLUCOPHAGE XR) 500 MG TABLET SR 24 HR Take 1,000 mg by mouth 2 times a day. 1,000 mg = 2 tabs      Probiotic Product (PROBIOTIC PO) Take 1 Tablet by mouth every day.      [DISCONTINUED] sacubitril-valsartan (ENTRESTO)  MG Tab Take 1 Tablet by mouth 2 times a day. 180 Tablet 4    [DISCONTINUED] metoprolol (TOPROL XL) 200 MG XL tablet Take 1 Tablet by mouth every day. 90 Tablet 1    [DISCONTINUED] dapagliflozin propanediol (FARXIGA) 10 MG Tab Take 1 Tablet by mouth every day. 90 Tablet 1    [DISCONTINUED]  spironolactone (ALDACTONE) 25 MG Tab Take 1 Tablet by mouth every day. 90 Tablet 1    [DISCONTINUED] apixaban (ELIQUIS) 5mg Tab Take 1 Tablet by mouth 2 times a day. 180 Tablet 1    [DISCONTINUED] amiodarone (CORDARONE) 200 MG Tab Take 1 Tablet by mouth every day. 30 Tablet 11    [DISCONTINUED] rosuvastatin (CRESTOR) 10 MG Tab Take 10 mg by mouth every day.       No facility-administered encounter medications on file as of 2/5/2024.     Review of Systems   Constitutional:  Negative for diaphoresis and fever.   HENT:  Negative for nosebleeds.    Eyes:  Negative for blurred vision and double vision.   Respiratory:  Positive for shortness of breath. Negative for cough.    Cardiovascular:  Negative for chest pain and palpitations.   Gastrointestinal:  Negative for abdominal pain.   Genitourinary:  Negative for dysuria and frequency.   Musculoskeletal:  Negative for falls and myalgias.   Skin:  Negative for rash.   Neurological:  Negative for dizziness, sensory change and headaches.   Endo/Heme/Allergies:  Does not bruise/bleed easily.   Psychiatric/Behavioral:  Negative for depression and memory loss.               Objective     /60 (BP Location: Right arm, Patient Position: Sitting, BP Cuff Size: Adult)   Pulse (!) 55   Resp 17   Ht 1.829 m (6')   Wt 102 kg (224 lb)   SpO2 96%   BMI 30.38 kg/m²     Physical Exam  Vitals and nursing note reviewed.   Constitutional:       General: He is not in acute distress.     Appearance: He is not diaphoretic.   HENT:      Head: Normocephalic and atraumatic.      Right Ear: External ear normal.      Left Ear: External ear normal.      Nose: No congestion or rhinorrhea.   Eyes:      General:         Right eye: No discharge.         Left eye: No discharge.   Neck:      Thyroid: No thyromegaly.      Vascular: No JVD.   Cardiovascular:      Rate and Rhythm: Normal rate. Rhythm irregular.      Pulses: Normal pulses.   Pulmonary:      Effort: No respiratory distress.    Abdominal:      General: There is no distension.      Tenderness: There is no abdominal tenderness.   Musculoskeletal:         General: No swelling or tenderness.      Right lower leg: No edema.      Left lower leg: No edema.   Skin:     General: Skin is warm and dry.   Neurological:      Mental Status: He is alert and oriented to person, place, and time.      Cranial Nerves: No cranial nerve deficit.   Psychiatric:         Behavior: Behavior normal.                Assessment & Plan     1. ACC/AHA stage C systolic heart failure (HCC)  dapagliflozin propanediol (FARXIGA) 10 MG Tab    metoprolol (TOPROL XL) 200 MG XL tablet    sacubitril-valsartan (ENTRESTO)  MG Tab    spironolactone (ALDACTONE) 25 MG Tab      2. Left ventricular systolic dysfunction, NYHA class 3  metoprolol (TOPROL XL) 200 MG XL tablet    sacubitril-valsartan (ENTRESTO)  MG Tab      3. Non-ischemic cardiomyopathy (HCC)        4. HTN (hypertension), malignant        5. PAF (paroxysmal atrial fibrillation) (HCC)  amiodarone (CORDARONE) 100 MG tablet      6. Hypercoagulable state due to persistent atrial fibrillation (HCC)        7. Type 2 diabetes mellitus with hyperglycemia, without long-term current use of insulin (HCC)  rosuvastatin (CRESTOR) 10 MG Tab    Basic Metabolic Panel    LIPID PANEL      8. Dyspnea on exertion  metoprolol (TOPROL XL) 200 MG XL tablet    sacubitril-valsartan (ENTRESTO)  MG Tab    proBrain Natriuretic Peptide, NT      9. High risk medication use        10. Persistent atrial fibrillation (HCC)  apixaban (ELIQUIS) 5mg Tab    metoprolol (TOPROL XL) 200 MG XL tablet      11. Smoking [F17.200]        12. Drug-induced erectile dysfunction  sildenafil citrate (VIAGRA) 100 MG tablet          Medical Decision Making: Today's Assessment/Status/Plan:   Non-Ischemic Cardiomyopathy with normalization of LVEF:  Chronically illed condition which requires ongoing close monitoring and treatment to improve survival rate  along with decreasing risk of clinical decompensation sudden cardiac death and hospitalization.    Today, based on physical examination findings, patient is euvolemic. No JVD, lungs are clear to auscultation, no pitting edema in bilateral lower extremities, no ascites.     Dry weight is 224 lbs.    Based on the overall clinical history and profile, patient is a good candidate for Entresto therapy (with superiority over ACE-I therapy in terms of survival benefits and prevention of future hospitalization).  Will continue Entresto therapy at 97/103 mg po bid.     Will continue Toprol at 200 mg daily.     Aldactone antagonist with Spironolactone / Eplerenone 25 mg daily.    Based on recent data on SGLT2 and heart failure with reduced ejection fraction, patient will be benefited from Farxiga 10 mg p.o. once a day for further reduction in mortality and hospitalization with absolute risk reduction of 4.9%.  Therefore, I will continue patient on Farxiga 10 mg p.o. once a day.  Risks and benefits were explained to patient and patient has agreed to proceed.    Will need to stop drinking ETOH all together.    I spent 5 minutes talking to patient about the danger of smoking. I advised patient and counseled patient on smoking cessation. Patient has promised to achieve goal of zero cigarettes per day.    Paroxysmal atrial fibrillation:  Anticoagulation with Eliquis 5  mg 2x daily.  At this time, I do think that patient would be benefited from sinus restoration given his young age along with cardiomyopathy.  Therefore, I will refer patient to electrophysiology for further evaluation for potential ablation procedure for more permanent treatment of his atrial fibrillation.  Patient did have failed cardioversion while he was in the hospital.  In the meantime, we will continue amiodarone therapy.  Will reduce Amiodarone 100 mg daily.    Hypertension:  Optimize control using cardiomyopathy medical regimen as  well.    Hyperlipidemia:  Optimize statin as within guidelines of CAD treatment as above.     Of note, during the care of this patient, I spent a significant amount of time explaining the nature of the disease process, reviewing all possible imaging studies, blood test results to patient.  The overall care of this patient require a higher level of care than usual due to the multiple comorbidities along with ongoing issues of congestive heart failure that put this patient at risk for sudden cardiac death, increased mortality, and hospitalization.  This patient also requires close monitoring with at least monthly blood test to monitor renal function and electrolytes due to the ongoing dynamic changes of medical therapy titration protocol to ensure optimal benefits for the overall care of this patient.    Ira Bacon M.D.

## 2024-02-06 ENCOUNTER — OFFICE VISIT (OUTPATIENT)
Dept: CARDIOLOGY | Facility: MEDICAL CENTER | Age: 59
End: 2024-02-06
Attending: NURSE PRACTITIONER
Payer: COMMERCIAL

## 2024-02-06 VITALS
BODY MASS INDEX: 29.93 KG/M2 | RESPIRATION RATE: 16 BRPM | WEIGHT: 221 LBS | OXYGEN SATURATION: 99 % | HEART RATE: 55 BPM | SYSTOLIC BLOOD PRESSURE: 92 MMHG | DIASTOLIC BLOOD PRESSURE: 50 MMHG | HEIGHT: 72 IN

## 2024-02-06 DIAGNOSIS — I50.20 ACC/AHA STAGE C SYSTOLIC HEART FAILURE (HCC): ICD-10-CM

## 2024-02-06 DIAGNOSIS — D68.318 CIRCULATING ANTICOAGULANTS (HCC): ICD-10-CM

## 2024-02-06 DIAGNOSIS — Z98.890 H/O CARDIAC RADIOFREQUENCY ABLATION: ICD-10-CM

## 2024-02-06 DIAGNOSIS — I48.91 ATRIAL FIBRILLATION, UNSPECIFIED TYPE (HCC): ICD-10-CM

## 2024-02-06 DIAGNOSIS — I51.89 LEFT VENTRICULAR SYSTOLIC DYSFUNCTION, NYHA CLASS 3: ICD-10-CM

## 2024-02-06 PROCEDURE — 99213 OFFICE O/P EST LOW 20 MIN: CPT | Performed by: NURSE PRACTITIONER

## 2024-02-06 PROCEDURE — 3074F SYST BP LT 130 MM HG: CPT | Performed by: NURSE PRACTITIONER

## 2024-02-06 PROCEDURE — 93005 ELECTROCARDIOGRAM TRACING: CPT | Performed by: NURSE PRACTITIONER

## 2024-02-06 PROCEDURE — 3078F DIAST BP <80 MM HG: CPT | Performed by: NURSE PRACTITIONER

## 2024-02-06 PROCEDURE — 99214 OFFICE O/P EST MOD 30 MIN: CPT | Performed by: NURSE PRACTITIONER

## 2024-02-06 RX ORDER — SACUBITRIL AND VALSARTAN 49; 51 MG/1; MG/1
1 TABLET, FILM COATED ORAL 2 TIMES DAILY
Qty: 90 TABLET | Refills: 1 | Status: SHIPPED | OUTPATIENT
Start: 2024-02-06

## 2024-02-06 RX ORDER — FUROSEMIDE 40 MG/1
40 TABLET ORAL
Qty: 90 TABLET | Refills: 1 | Status: SHIPPED | OUTPATIENT
Start: 2024-02-06

## 2024-02-06 ASSESSMENT — ENCOUNTER SYMPTOMS
SHORTNESS OF BREATH: 0
PALPITATIONS: 0
HEARTBURN: 0
CHILLS: 0
LOSS OF CONSCIOUSNESS: 0
COUGH: 0
VOMITING: 0
HEADACHES: 0
SPEECH CHANGE: 0
NAUSEA: 0
TREMORS: 0
PND: 0
WHEEZING: 0
HEMOPTYSIS: 0
WEIGHT LOSS: 0
FOCAL WEAKNESS: 0
SENSORY CHANGE: 0
BLOOD IN STOOL: 0
DIZZINESS: 0
TINGLING: 0
FEVER: 0
SPUTUM PRODUCTION: 0
ORTHOPNEA: 0

## 2024-02-06 ASSESSMENT — FIBROSIS 4 INDEX: FIB4 SCORE: 0.89

## 2024-02-06 NOTE — PROGRESS NOTES
Chief Complaint   Patient presents with    Atrial Fibrillation     F/V Dx:Persistent atrial fibrillation       Subjective     Harjit Pantoja is a 58 y.o. male who presents today for procedural follow-up following ablation of persistent atrial fibrillation by Dr. Bryn Flores 1/15/2024.    Procedure completed with PVI, LA roof ablation, PWI and RA CTI for typical flutter.    Additional medical history significant for congestive heart failure, hypertension, hyperlipidemia, diabetes.  He is followed by Dr. Flores for EP and Dr. Bacon for cardiology/heart failure clinic.    Today in follow-up he is accompanied by his wife.  Reports overall feeling well from a cardiovascular standpoint though reports significant orthostasis with positional changes, to the point of getting tunnel vision.  From a A-fib standpoint she is not aware of any arrhythmias.  Historically unable to know when he has had A-fib, he does however check his blood pressure regularly which has not given him any atrial fibrillation indications.    He does not report chest pain or shortness of breath.  No peripheral edema.  No PND or orthopnea.  No lower extremity edema.  Currently not checking weight too often.    Past Medical History:   Diagnosis Date    Apnea, sleep     Atrial fibrillation (HCC)     Bronchitis     Chickenpox     Cold 12/07/2011    stomach flu 12/3/11    Congestive heart failure (HCC) 10/23    Diabetes (HCC)     Difficulty breathing     Hearing difficulty     Hyperlipidemia     Hypertension     Influenza     Shortness of breath     Snoring     Swelling of lower extremity     Wears glasses      Past Surgical History:   Procedure Laterality Date    VENTRAL HERNIA REPAIR LAPAROSCOPIC  12/14/2011    Performed by TUYET RIDLEY at SURGERY [facility] ORS    COLON RESECTION      OTHER ABDOMINAL SURGERY      repair of diverticulitis; colon resection     Family History   Problem Relation Age of Onset    Lung Cancer Mother         Diagnosed with  lung cancer this past summer    Cancer Mother         Lung cancer    Prostate cancer Father         Passed away prostate cancer and heart problems    Cancer Father         Prostate cancer    Diabetes Father     Heart Disease Father     Hypertension Father     Hyperlipidemia Father     Alcohol abuse Father      Social History     Socioeconomic History    Marital status:      Spouse name: Not on file    Number of children: Not on file    Years of education: Not on file    Highest education level: Not on file   Occupational History    Not on file   Tobacco Use    Smoking status: Every Day     Current packs/day: 0.00     Average packs/day: 1 pack/day for 32.0 years (32.0 ttl pk-yrs)     Types: Cigarettes     Start date: 2001     Last attempt to quit: 10/22/2023     Years since quittin.2    Smokeless tobacco: Current    Tobacco comments:     Smoked a pack of day since 36. Quit three weeks ago and have had a few cigarettes the last few days   Vaping Use    Vaping Use: Former    Quit date: 2019   Substance and Sexual Activity    Alcohol use: Not Currently     Alcohol/week: 2.4 oz     Types: 4 Standard drinks or equivalent per week     Comment: Was an every night heavy beer drinker until a month ago.    Drug use: Yes     Types: Oral     Comment: one every two weeks.   marijuana    Sexual activity: Not on file   Other Topics Concern    Not on file   Social History Narrative    Not on file     Social Determinants of Health     Financial Resource Strain: Not on file   Food Insecurity: Not on file   Transportation Needs: Not on file   Physical Activity: Not on file   Stress: Not on file   Social Connections: Not on file   Intimate Partner Violence: Not on file   Housing Stability: Not on file     Allergies   Allergen Reactions    Cleocin [Clindamycin Hcl] Hives    Pcn [Penicillins] Hives     Outpatient Encounter Medications as of 2024   Medication Sig Dispense Refill    apixaban (ELIQUIS) 5mg Tab Take 1  Tablet by mouth 2 times a day. 180 Tablet 4    dapagliflozin propanediol (FARXIGA) 10 MG Tab Take 1 Tablet by mouth every day. 90 Tablet 4    metoprolol (TOPROL XL) 200 MG XL tablet Take 1 Tablet by mouth every day. 90 Tablet 4    rosuvastatin (CRESTOR) 10 MG Tab Take 1 Tablet by mouth every day. 100 Tablet 4    sacubitril-valsartan (ENTRESTO)  MG Tab Take 1 Tablet by mouth 2 times a day. 180 Tablet 4    spironolactone (ALDACTONE) 25 MG Tab Take 1 Tablet by mouth every day. 90 Tablet 4    amiodarone (CORDARONE) 100 MG tablet Take 1 Tablet by mouth every day. 100 Tablet 4    sildenafil citrate (VIAGRA) 100 MG tablet Take 1 Tablet by mouth 1 time a day as needed for Erectile Dysfunction. 10 Tablet 3    omeprazole (PRILOSEC) 20 MG delayed-release capsule Take 1 Capsule by mouth every morning before breakfast. 30 Capsule 0    furosemide (LASIX) 40 MG Tab Take 1 Tablet by mouth every day. 90 Tablet 1    metFORMIN ER (GLUCOPHAGE XR) 500 MG TABLET SR 24 HR Take 1,000 mg by mouth 2 times a day. 1,000 mg = 2 tabs      Probiotic Product (PROBIOTIC PO) Take 1 Tablet by mouth every day.       No facility-administered encounter medications on file as of 2/6/2024.     Review of Systems   Constitutional:  Negative for chills, fever, malaise/fatigue and weight loss.   HENT:  Negative for nosebleeds and tinnitus.    Respiratory:  Negative for cough, hemoptysis, sputum production, shortness of breath and wheezing.    Cardiovascular:  Negative for chest pain, palpitations, orthopnea, leg swelling and PND.   Gastrointestinal:  Negative for blood in stool, heartburn, nausea and vomiting.   Skin:  Negative for rash.   Neurological:  Negative for dizziness, tingling, tremors, sensory change, speech change, focal weakness, loss of consciousness and headaches.        Lightheadedness               Objective     BP 92/50 (BP Location: Left arm, Patient Position: Sitting, BP Cuff Size: Adult)   Pulse (!) 55   Resp 16   Ht 1.829 m (6')    Wt 100 kg (221 lb)   SpO2 99%   BMI 29.97 kg/m²     Physical Exam  Vitals reviewed.   Constitutional:       Appearance: Normal appearance. He is well-developed.   HENT:      Head: Normocephalic and atraumatic.      Mouth/Throat:      Mouth: Mucous membranes are moist.      Pharynx: Oropharynx is clear.   Eyes:      Extraocular Movements: Extraocular movements intact.      Conjunctiva/sclera: Conjunctivae normal.      Pupils: Pupils are equal, round, and reactive to light.   Neck:      Vascular: No JVD.   Cardiovascular:      Rate and Rhythm: Normal rate and regular rhythm.      Pulses: Normal pulses.      Heart sounds: Normal heart sounds. No murmur heard.     No friction rub. No gallop.   Pulmonary:      Effort: Pulmonary effort is normal.      Breath sounds: Normal breath sounds. No wheezing or rales.   Chest:      Chest wall: No tenderness.   Musculoskeletal:         General: Normal range of motion.      Cervical back: Normal range of motion and neck supple.      Right lower leg: No edema.      Left lower leg: No edema.   Skin:     General: Skin is warm and dry.      Capillary Refill: Capillary refill takes 2 to 3 seconds.   Neurological:      Mental Status: He is alert and oriented to person, place, and time.   Psychiatric:         Mood and Affect: Mood normal.         Behavior: Behavior normal.         Thought Content: Thought content normal.         Judgment: Judgment normal.                Assessment & Plan     1. Atrial fibrillation, unspecified type (HCC)  EKG    sacubitril-valsartan (ENTRESTO) 49-51 MG Tab      2. H/O cardiac radiofrequency ablation. 1/15/24 Dr Flores        3. ACC/AHA stage C systolic heart failure (HCC)  furosemide (LASIX) 40 MG Tab    sacubitril-valsartan (ENTRESTO) 49-51 MG Tab      4. Left ventricular systolic dysfunction, NYHA class 3  sacubitril-valsartan (ENTRESTO) 49-51 MG Tab      5. Circulating anticoagulants (HCC)            Medical Decision Making: Today's  Assessment/Status/Plan:   1.  Previously persistent atrial fibrillation  2.  Status post ablation  - Status post ablation of persistent atrial fibrillation and flutter with Dr. Flores 1/15/2024 procedure completed with PVI, PWI, LA roof and RA CTI for typical atrial flutter.  -Clinically doing well post ablation.  Maintaining sinus bradycardia with right bundle branch block today on twelve-lead EKG.  -Discussed stopping amiodarone today.  Discussed washout period of amiodarone.  He will notify us if he has atrial fibrillation lasting more than 1 to 2 days.    3.  Dilated cardiomyopathy with systolic heart failure  - Recovery of ejection fraction to approximately 67% on most recent echocardiogram.  He is euvolemic on exam and compensated.  -He does report quite significant orthostasis with positional changes.  His current weight is under his dry weight of 224 pounds, therefore we will try to transition Lasix to daily as needed instead of daily.  We discussed the parameters for taking Lasix as needed.  We discussed weighing daily.  We will also reduce Entresto from high-dose to moderate dose.  -Additionally discussed adequate water intake with at least 64 ounces of fluid daily.    4.  Anticoagulation  - Continue Eliquis 5 mg twice daily.    Return to clinic in April as scheduled with Dr. Flores, sooner if clinical condition changes.  PLEASE NOTE: This Note was created using voice recognition Software. I have made every reasonable attempt to correct obvious errors, but I expect that there are errors of grammar and possibly content that I did not discover before finalizing the note

## 2024-02-06 NOTE — PATIENT INSTRUCTIONS
Change Lasix to 40 mg daily as needed, see doing instructions on bottle   Decrease entresto to medium dose, can take 1/2 tab of current dose twice daily until bottle finished  Please weigh daily and log.

## 2024-02-07 NOTE — TELEPHONE ENCOUNTER
Prior Authorization for Sildenafil Citrate 100MG tablets  has been denied     Prior authorization was denied per the following: Certain medication have been excluded from your plans formulary- The information received indicated that this is for the treatment of erectile dysfunction which is a plan exclusion.     Prior Authorization denial reference number: [id number]    Insurance: [employer]    Next Steps:Routing denial to liaisons to follow up with medical team on next steps

## 2024-02-23 LAB — EKG IMPRESSION: NORMAL

## 2024-04-08 ENCOUNTER — HOSPITAL ENCOUNTER (OUTPATIENT)
Dept: LAB | Facility: MEDICAL CENTER | Age: 59
End: 2024-04-08
Payer: COMMERCIAL

## 2024-04-08 DIAGNOSIS — R06.09 DYSPNEA ON EXERTION: ICD-10-CM

## 2024-04-08 DIAGNOSIS — E11.65 TYPE 2 DIABETES MELLITUS WITH HYPERGLYCEMIA, WITHOUT LONG-TERM CURRENT USE OF INSULIN (HCC): ICD-10-CM

## 2024-04-08 LAB
ANION GAP SERPL CALC-SCNC: 16 MMOL/L (ref 7–16)
BUN SERPL-MCNC: 30 MG/DL (ref 8–22)
CALCIUM SERPL-MCNC: 9.6 MG/DL (ref 8.5–10.5)
CHLORIDE SERPL-SCNC: 96 MMOL/L (ref 96–112)
CO2 SERPL-SCNC: 23 MMOL/L (ref 20–33)
CREAT SERPL-MCNC: 1.36 MG/DL (ref 0.5–1.4)
GFR SERPLBLD CREATININE-BSD FMLA CKD-EPI: 60 ML/MIN/1.73 M 2
GLUCOSE SERPL-MCNC: 134 MG/DL (ref 65–99)
NT-PROBNP SERPL IA-MCNC: 54 PG/ML (ref 0–125)
POTASSIUM SERPL-SCNC: 4.7 MMOL/L (ref 3.6–5.5)
SODIUM SERPL-SCNC: 135 MMOL/L (ref 135–145)

## 2024-04-08 PROCEDURE — 36415 COLL VENOUS BLD VENIPUNCTURE: CPT

## 2024-04-08 PROCEDURE — 80048 BASIC METABOLIC PNL TOTAL CA: CPT

## 2024-04-08 PROCEDURE — 83880 ASSAY OF NATRIURETIC PEPTIDE: CPT

## 2024-04-10 ENCOUNTER — OFFICE VISIT (OUTPATIENT)
Dept: CARDIOLOGY | Facility: MEDICAL CENTER | Age: 59
End: 2024-04-10
Attending: INTERNAL MEDICINE
Payer: COMMERCIAL

## 2024-04-10 VITALS
HEIGHT: 72 IN | BODY MASS INDEX: 29.66 KG/M2 | OXYGEN SATURATION: 97 % | WEIGHT: 219 LBS | RESPIRATION RATE: 14 BRPM | HEART RATE: 58 BPM | SYSTOLIC BLOOD PRESSURE: 120 MMHG | DIASTOLIC BLOOD PRESSURE: 62 MMHG

## 2024-04-10 DIAGNOSIS — I48.91 ATRIAL FIBRILLATION, UNSPECIFIED TYPE (HCC): ICD-10-CM

## 2024-04-10 PROCEDURE — 93010 ELECTROCARDIOGRAM REPORT: CPT | Performed by: INTERNAL MEDICINE

## 2024-04-10 PROCEDURE — 3078F DIAST BP <80 MM HG: CPT | Performed by: INTERNAL MEDICINE

## 2024-04-10 PROCEDURE — 3074F SYST BP LT 130 MM HG: CPT | Performed by: INTERNAL MEDICINE

## 2024-04-10 PROCEDURE — 99212 OFFICE O/P EST SF 10 MIN: CPT | Performed by: INTERNAL MEDICINE

## 2024-04-10 PROCEDURE — 99214 OFFICE O/P EST MOD 30 MIN: CPT | Mod: 25 | Performed by: INTERNAL MEDICINE

## 2024-04-10 PROCEDURE — 93005 ELECTROCARDIOGRAM TRACING: CPT | Performed by: INTERNAL MEDICINE

## 2024-04-10 RX ORDER — ASPIRIN 81 MG/1
81 TABLET ORAL DAILY
COMMUNITY

## 2024-04-10 ASSESSMENT — FIBROSIS 4 INDEX: FIB4 SCORE: 0.89

## 2024-04-10 NOTE — PROGRESS NOTES
Arrhythmia Clinic Note (Established patient)    DOS: 4/10/2024    Chief complaint/Reason for consult: Afib    Interval History: 58-year-old man.  Diagnosed last year with acute systolic heart failure in the setting of rapid atrial fibrillation.  He was started on Entresto, Farxiga, metoprolol, spironolactone, and amiodarone.  He had failed attempted inpatient cardioversion.  I saw him in November 2023 and by this point he had been chemically cardioverted by outpatient amiodarone.  We took him for catheter ablation in January 2024.  Amiodarone was discontinued.  He had an echocardiogram shortly thereafter showing complete normalization of his ejection fraction.  However he was maintained on Entresto, Farxiga, metoprolol, and spironolactone.  Amiodarone was discontinued upon EP follow-up after ablation.  Today he notes that he is feeling well.  However he is very concerned about all the medications he is on and is hoping to come off of them.    ROS (+ highlighted in bold):  Constitutional: Fevers/chills/fatigue/weightloss  HEENT: Blurry vision/eye pain/sore throat/hearing loss  Respiratory: Shortness of breath/cough  Cardiovascular: Chest pain/palpitations/edema/orthopnea/syncope  GI: Nausea/vomitting/diarrhea  MSK: Arthralgias/myagias/muscle weakness  Skin: Rash/sores  Neurological: Numbness/tremors/vertigo  Endocrine: Excessive thirst/polyuria/cold intolerance/heat intolerance  Psych: Depression/anxiety    Past Medical History:   Diagnosis Date    Apnea, sleep     Atrial fibrillation (HCC)     Bronchitis     Chickenpox     Cold 12/07/2011    stomach flu 12/3/11    Congestive heart failure (HCC) 10/23    Diabetes (HCC)     Difficulty breathing     Hearing difficulty     Hyperlipidemia     Hypertension     Influenza     Shortness of breath     Snoring     Swelling of lower extremity     Wears glasses        Past Surgical History:   Procedure Laterality Date    VENTRAL HERNIA REPAIR LAPAROSCOPIC  12/14/2011     Performed by TUYET RIDLEY [facility]    COLON RESECTION      OTHER ABDOMINAL SURGERY      repair of diverticulitis; colon resection       Social History     Socioeconomic History    Marital status:      Spouse name: Not on file    Number of children: Not on file    Years of education: Not on file    Highest education level: Not on file   Occupational History    Not on file   Tobacco Use    Smoking status: Every Day     Current packs/day: 0.00     Average packs/day: 1 pack/day for 32.0 years (32.0 ttl pk-yrs)     Types: Cigarettes     Start date: 2001     Last attempt to quit: 10/22/2023     Years since quittin.4    Smokeless tobacco: Current    Tobacco comments:     Smoked a pack of day since 36. Quit three weeks ago and have had a few cigarettes the last few days   Vaping Use    Vaping Use: Former    Quit date: 2019   Substance and Sexual Activity    Alcohol use: Not Currently     Alcohol/week: 2.4 oz     Types: 4 Standard drinks or equivalent per week     Comment: Was an every night heavy beer drinker until a month ago.    Drug use: Yes     Types: Oral     Comment: one every two weeks.   marijuana    Sexual activity: Not on file   Other Topics Concern    Not on file   Social History Narrative    Not on file     Social Determinants of Health     Financial Resource Strain: Not on file   Food Insecurity: Not on file   Transportation Needs: Not on file   Physical Activity: Not on file   Stress: Not on file   Social Connections: Not on file   Intimate Partner Violence: Not on file   Housing Stability: Not on file       Family History   Problem Relation Age of Onset    Lung Cancer Mother         Diagnosed with lung cancer this past summer    Cancer Mother         Lung cancer    Prostate cancer Father         Passed away prostate cancer and heart problems    Cancer Father         Prostate cancer    Diabetes Father     Heart Disease Father     Hypertension Father     Hyperlipidemia  Father     Alcohol abuse Father        Allergies   Allergen Reactions    Cleocin [Clindamycin Hcl] Hives    Pcn [Penicillins] Hives       Current Outpatient Medications   Medication Sig Dispense Refill    furosemide (LASIX) 40 MG Tab Take 1 Tablet by mouth 1 time a day as needed (for weight gain >2 lbs overnight, 5 lbs over one week, of increased SOB or swelling). 90 Tablet 1    sacubitril-valsartan (ENTRESTO) 49-51 MG Tab Take 1 Tablet by mouth 2 times a day. 90 Tablet 1    apixaban (ELIQUIS) 5mg Tab Take 1 Tablet by mouth 2 times a day. 180 Tablet 4    dapagliflozin propanediol (FARXIGA) 10 MG Tab Take 1 Tablet by mouth every day. 90 Tablet 4    metoprolol (TOPROL XL) 200 MG XL tablet Take 1 Tablet by mouth every day. 90 Tablet 4    rosuvastatin (CRESTOR) 10 MG Tab Take 1 Tablet by mouth every day. 100 Tablet 4    spironolactone (ALDACTONE) 25 MG Tab Take 1 Tablet by mouth every day. 90 Tablet 4    sildenafil citrate (VIAGRA) 100 MG tablet Take 1 Tablet by mouth 1 time a day as needed for Erectile Dysfunction. 10 Tablet 3    metFORMIN ER (GLUCOPHAGE XR) 500 MG TABLET SR 24 HR Take 1,000 mg by mouth 2 times a day. 1,000 mg = 2 tabs      Probiotic Product (PROBIOTIC PO) Take 1 Tablet by mouth every day.       No current facility-administered medications for this visit.       Physical Exam:  Vitals:    04/10/24 0757   BP: 120/62   BP Location: Left arm   Patient Position: Sitting   BP Cuff Size: Adult   Pulse: (!) 58   Resp: 14   SpO2: 97%   Weight: 99.3 kg (219 lb)   Height: 1.829 m (6')     General appearance: NAD, conversant   Eyes: anicteric sclerae, moist conjunctivae; no lid-lag; PERRLA  HENT: Atraumatic; oropharynx clear with moist mucous membranes and no mucosal ulcerations; normal hard and soft palate  Neck: Trachea midline; FROM, supple, no thyromegaly or lymphadenopathy  Lungs: CTA, with normal respiratory effort and no intercostal retractions  CV: RRR, no MRGs, no JVD  Abdomen: Soft, non-tender; no masses  "or HSM  Extremities: No peripheral edema or extremity lymphadenopathy  Skin: Normal temperature, turgor and texture; no rash, ulcers or subcutaneous nodules  Psych: Appropriate affect, alert and oriented to person, place and time    Data:  Lipids:   Lab Results   Component Value Date/Time    CHOLSTRLTOT 116 12/08/2022 10:54 AM    TRIGLYCERIDE 58 12/08/2022 10:54 AM    HDL 69 12/08/2022 10:54 AM    LDL 35 12/08/2022 10:54 AM        BMP:  Lab Results   Component Value Date/Time    SODIUM 135 04/08/2024 0928    POTASSIUM 4.7 04/08/2024 0928    CHLORIDE 96 04/08/2024 0928    CO2 23 04/08/2024 0928    GLUCOSE 134 (H) 04/08/2024 0928    BUN 30 (H) 04/08/2024 0928    CREATININE 1.36 04/08/2024 0928    CALCIUM 9.6 04/08/2024 0928    ANION 16.0 04/08/2024 0928        TSH:   Lab Results   Component Value Date/Time    TSHULTRASEN 2.010 10/18/2023 0048        THYROXINE (T4):   No results found for: \"FREEDIR\"     CBC:   Lab Results   Component Value Date/Time    WBC 9.5 01/12/2024 10:07 AM    RBC 5.29 01/12/2024 10:07 AM    HEMOGLOBIN 15.8 01/12/2024 10:07 AM    HEMATOCRIT 46.1 01/12/2024 10:07 AM    MCV 87.1 01/12/2024 10:07 AM    MCH 29.9 01/12/2024 10:07 AM    MCHC 34.3 01/12/2024 10:07 AM    RDW 43.7 01/12/2024 10:07 AM    PLATELETCT 235 01/12/2024 10:07 AM    MPV 11.0 01/12/2024 10:07 AM    NEUTSPOLYS 64.20 01/12/2024 10:07 AM    LYMPHOCYTES 26.70 01/12/2024 10:07 AM    MONOCYTES 6.30 01/12/2024 10:07 AM    EOSINOPHILS 1.70 01/12/2024 10:07 AM    BASOPHILS 0.70 01/12/2024 10:07 AM    IMMGRAN 0.40 01/12/2024 10:07 AM    NRBC 0.00 01/12/2024 10:07 AM    NEUTS 6.06 01/12/2024 10:07 AM    LYMPHS 2.52 01/12/2024 10:07 AM    MONOS 0.60 01/12/2024 10:07 AM    EOS 0.16 01/12/2024 10:07 AM    BASO 0.07 01/12/2024 10:07 AM    IMMGRANAB 0.04 01/12/2024 10:07 AM    NRBCAB 0.00 01/12/2024 10:07 AM        CBC w/o DIFF  Lab Results   Component Value Date/Time    WBC 9.5 01/12/2024 10:07 AM    RBC 5.29 01/12/2024 10:07 AM    HEMOGLOBIN " 15.8 01/12/2024 10:07 AM    MCV 87.1 01/12/2024 10:07 AM    MCH 29.9 01/12/2024 10:07 AM    MCHC 34.3 01/12/2024 10:07 AM    RDW 43.7 01/12/2024 10:07 AM    MPV 11.0 01/12/2024 10:07 AM       Prior echo/stress reviewed: Normal ejection fraction    EKG interpreted by me: Normal sinus rhythm    Impression/Plan:  1. Atrial fibrillation, unspecified type (HCC)  EKG        1.  Persistent atrial fibrillation status post ablation  2.  Arrhythmia mediated cardiomyopathy  3.  Heart failure with recovered ejection fraction due to atrial fibrillation  4.  Diabetes type 2    -I discussed at length with him that I believe his systolic heart failure was due to atrial fibrillation and has normalized in sinus rhythm post ablation.  At this time I do not believe he will benefit much from his heart failure medications and thus I will discontinue all but his Farxiga seeing as he is a diabetic and may be getting some glucose control with this.  We also discussed his stroke risk is low given his lack of risk factors aside from diabetes, and given that he is now 3 months out from ablation, I believe it is reasonable to switch to 81 mg aspirin and stop Eliquis.    We we will see him back in electrophysiology clinic as needed if any recurrent arrhythmia.  Otherwise 6-month follow-up with cardiology    Bryn Flores MD  Cardiac Electrophysiology

## 2024-04-25 LAB — EKG IMPRESSION: NORMAL

## 2024-04-30 ENCOUNTER — OFFICE VISIT (OUTPATIENT)
Dept: SLEEP MEDICINE | Facility: MEDICAL CENTER | Age: 59
End: 2024-04-30
Attending: PHYSICIAN ASSISTANT
Payer: COMMERCIAL

## 2024-04-30 VITALS
WEIGHT: 223.8 LBS | HEART RATE: 72 BPM | RESPIRATION RATE: 16 BRPM | HEIGHT: 72 IN | OXYGEN SATURATION: 94 % | SYSTOLIC BLOOD PRESSURE: 120 MMHG | DIASTOLIC BLOOD PRESSURE: 66 MMHG | BODY MASS INDEX: 30.31 KG/M2

## 2024-04-30 DIAGNOSIS — G47.33 OSA (OBSTRUCTIVE SLEEP APNEA): ICD-10-CM

## 2024-04-30 PROCEDURE — 3078F DIAST BP <80 MM HG: CPT | Performed by: PHYSICIAN ASSISTANT

## 2024-04-30 PROCEDURE — 99213 OFFICE O/P EST LOW 20 MIN: CPT | Performed by: PHYSICIAN ASSISTANT

## 2024-04-30 PROCEDURE — 3074F SYST BP LT 130 MM HG: CPT | Performed by: PHYSICIAN ASSISTANT

## 2024-04-30 ASSESSMENT — ENCOUNTER SYMPTOMS
INSOMNIA: 0
TREMORS: 0
COUGH: 0
SPUTUM PRODUCTION: 0
CHILLS: 0
HEADACHES: 0
FEVER: 0
SHORTNESS OF BREATH: 0
HEARTBURN: 1
WEIGHT LOSS: 0
SINUS PAIN: 0
SORE THROAT: 0
ORTHOPNEA: 0
PALPITATIONS: 1
ROS GI COMMENTS: NO DENTURES, NO MISSING TEETH, NO SWALLOWING ISSUES
WHEEZING: 0
DIZZINESS: 0

## 2024-04-30 ASSESSMENT — FIBROSIS 4 INDEX: FIB4 SCORE: 0.89

## 2024-04-30 NOTE — PATIENT INSTRUCTIONS
1-reviewed compliance which is excellent  2-demonstrating use and benefit  3-Today we reviewed equipment cleaning  once weekly minimum  mask, tubing and water chamber  use dedicated container  use mild soap and water  SoClean or other ozone  are not recommended  white vinegar and water solution is no longer recommended  hang tubing to dry  mask sanitizing wipes are an option for use   4-As a reminder use distilled water only in humidifier chamber.    5-Equipment replacement schedule : Mask cushion every month, Head gear every 6 months, Tubing every 3 months, Ultra-fine filters 2 times per month, Humidifier chamber every 6 months  6-follow up in one year, sooner if needed

## 2024-04-30 NOTE — PROGRESS NOTES
Chief Complaint   Patient presents with    Follow-Up     10 wk fv   Last seen 1/11/24 -   JENNIFER  Nocturnal oxygen desaturation  Chronic systolic heart failure (HCC)  Atrial fibrillation, unspecific type (HCC)  CPAP from 5-8 cm       HPI:  Harjit Pantoja is a 58 y.o. year old male here today for follow-up on obstructive sleep apnea and first compliance.  Last seen in clinic 1/11/2024 by Dr. Xiomara Mendoza.    Past Medical History: Atrial fibrillation, status post ablation, congestive heart failure, hypertension, hyperlipidemia, type 2 diabetes.    Vitals:  /66 (BP Location: Left arm, Patient Position: Sitting, BP Cuff Size: Adult)   Pulse 72   Resp 16   Ht 1.829 m (6')   Wt 102 kg (223 lb 12.8 oz)   SpO2 94%  BMI 30.35 kg/m2.     Recent Imaging: Echocardiogram obtained 1/30/2024 demonstrated normal left ventricular chamber size, systolic function, mild concentric left ventricular hypertrophy.  LVEF estimated at 69%, indeterminate diastolic function due to stunned left atrium post ablation.  Normal right ventricular size and systolic function.  Trace mitral regurgitation, trace tricuspid regurgitation with estimated RVSP of 24 mmHg.  Trace pulmonic insufficiency.    Currently using  Resmed auto CPAP @7-11 cm H20 pressure; compliance reviewed for 3/31/2024 through 4/29/2024, days used 29/30, average daily usage 6 hours 43 minutes, 93% of days greater than or equal to 4 hours, mask leak at 15.6 LPM at 95th percentile, AHI 2.4 per hour.  See media for full report    Device obtained March 2024  DME provider [employer]   Mask interface hybrid fullface mask    Split-night polysomnogram obtained 12/1/2023 demonstrating overall AHI of 27 events per hour increasing to 42/h during REM and 37.5/h while supine.  Low O2 sat of 82% with sats less than or equal to 88 for 10.5 minutes of total sleep time.  During treatment portion patient demonstrated low O2 sat of 87% with sats less than or equal to 88 for  3.8 minutes of total sleep time.  Findings consistent with moderate positional obstructive sleep apnea and mild nocturnal desaturation with successful CPAP titration.  Recommendation auto CPAP@6-10 cm H2O pressure.    Sleep schedule goes to bed around 8 PM falling asleep between 9-10 PM, wakens 5:15 AM , and gets up during the night 2 times to use the restroom.   Symptoms denies day time somnolence and denies morning headache    Norton Sleepiness Scale reported as 4/24 on 11/6/2023          Review of Systems   Constitutional:  Negative for chills, fever, malaise/fatigue and weight loss.   HENT:  Positive for congestion (occasional) and hearing loss (complete left). Negative for nosebleeds, sinus pain, sore throat and tinnitus.    Eyes:         Presc glasses   Respiratory:  Negative for cough, sputum production, shortness of breath and wheezing.    Cardiovascular:  Positive for palpitations (controlled post ablation). Negative for chest pain, orthopnea and leg swelling.   Gastrointestinal:  Positive for heartburn (occasional, pizza).        No dentures, no missing teeth, no swallowing issues    Neurological:  Negative for dizziness, tremors and headaches.   Psychiatric/Behavioral:  The patient does not have insomnia.        Past Medical History:   Diagnosis Date    Apnea, sleep     Atrial fibrillation (HCC)     Bronchitis     Chickenpox     Cold 12/07/2011    stomach flu 12/3/11    Congestive heart failure (HCC) 10/23    Diabetes (HCC)     Difficulty breathing     Hearing difficulty     Hyperlipidemia     Hypertension     Influenza     Shortness of breath     Snoring     Swelling of lower extremity     Wears glasses        Past Surgical History:   Procedure Laterality Date    VENTRAL HERNIA REPAIR LAPAROSCOPIC  12/14/2011    Performed by TUYET RIDLEY at SURGERY [facility] ORS    COLON RESECTION      OTHER ABDOMINAL SURGERY      repair of diverticulitis; colon resection       Family History   Problem Relation Age  of Onset    Lung Cancer Mother         Diagnosed with lung cancer this past summer    Cancer Mother         Lung cancer    Prostate cancer Father         Passed away prostate cancer and heart problems    Cancer Father         Prostate cancer    Diabetes Father     Heart Disease Father     Hypertension Father     Hyperlipidemia Father     Alcohol abuse Father        Social History     Socioeconomic History    Marital status:      Spouse name: Not on file    Number of children: Not on file    Years of education: Not on file    Highest education level: Not on file   Occupational History    Not on file   Tobacco Use    Smoking status: Every Day     Current packs/day: 0.00     Average packs/day: 1 pack/day for 32.0 years (32.0 ttl pk-yrs)     Types: Cigarettes     Start date: 2001     Last attempt to quit: 10/22/2023     Years since quittin.5    Smokeless tobacco: Current    Tobacco comments:     Smoked a pack of day since 36. Quit three weeks ago and have had a few cigarettes the last few days   Vaping Use    Vaping Use: Former    Quit date: 2019   Substance and Sexual Activity    Alcohol use: Not Currently     Alcohol/week: 2.4 oz     Types: 4 Standard drinks or equivalent per week     Comment: Was an every night heavy beer drinker until a month ago.    Drug use: Yes     Types: Oral     Comment: one every two weeks.   marijuana    Sexual activity: Not on file   Other Topics Concern    Not on file   Social History Narrative    Not on file     Social Determinants of Health     Financial Resource Strain: Not on file   Food Insecurity: Not on file   Transportation Needs: Not on file   Physical Activity: Not on file   Stress: Not on file   Social Connections: Not on file   Intimate Partner Violence: Not on file   Housing Stability: Not on file       Allergies as of 2024 - Reviewed 2024   Allergen Reaction Noted    Cleocin [clindamycin hcl] Hives 2011    Pcn [penicillins] Hives  12/07/2011          Current medications as of today   Current Outpatient Medications   Medication Sig Dispense Refill    aspirin 81 MG EC tablet Take 81 mg by mouth every day.      dapagliflozin propanediol (FARXIGA) 10 MG Tab Take 1 Tablet by mouth every day. 90 Tablet 4    rosuvastatin (CRESTOR) 10 MG Tab Take 1 Tablet by mouth every day. 100 Tablet 4    sildenafil citrate (VIAGRA) 100 MG tablet Take 1 Tablet by mouth 1 time a day as needed for Erectile Dysfunction. 10 Tablet 3    metFORMIN ER (GLUCOPHAGE XR) 500 MG TABLET SR 24 HR Take 1,000 mg by mouth 2 times a day. 1,000 mg = 2 tabs      Probiotic Product (PROBIOTIC PO) Take 1 Tablet by mouth every day.      furosemide (LASIX) 40 MG Tab Take 1 Tablet by mouth 1 time a day as needed (for weight gain >2 lbs overnight, 5 lbs over one week, of increased SOB or swelling). (Patient not taking: Reported on 4/30/2024) 90 Tablet 1     No current facility-administered medications for this visit.         Physical Exam:   Gen:           Alert and oriented, No apparent distress. Mood and affect appropriate, normal interaction with examiner.   Hearing:     Grossly intact.  Nose:          Normal, no lesions or deformities.  Dentition:    Good dentition.   Oropharynx:   Tongue normal, posterior pharynx without erythema or exudate.  Mallampati Classification: II  Neck:        Supple, trachea midline, no masses.  Respiratory Effort: No intercostal retractions or use of accessory muscles.   Gait and Station: Normal.  Digits and Nails: No clubbing, cyanosis, petechiae, or nodes.   Skin:        No rashes, lesions or ulcers noted.               Ext:           No cyanosis or edema.      Immunizations:  Flu: Recommended  SARS CoV2 Vaccine: Recommended     Assessment / Plan:  1. JENNIFER (obstructive sleep apnea)  - DME Mask and Supplies    Reviewed compliance which is excellent, patient demonstrating use and benefit.  Reviewed equipment cleaning, equipment replacement schedule, reminded  to use distilled water only in humidifier chamber.  Patient to follow-up in 1 year, sooner if needed.    Follow-up:   Return in about 1 year (around 4/30/2025) for Return with Amy Sheldon PA-C.    Please note that this dictation was created using voice recognition software. I have made every reasonable attempt to correct obvious errors, but it is possible there are errors of grammar and possibly content that I did not discover before finalizing the note.

## 2024-10-03 ENCOUNTER — APPOINTMENT (OUTPATIENT)
Dept: CARDIOLOGY | Facility: MEDICAL CENTER | Age: 59
End: 2024-10-03
Attending: INTERNAL MEDICINE
Payer: COMMERCIAL

## 2024-10-03 VITALS
SYSTOLIC BLOOD PRESSURE: 134 MMHG | HEART RATE: 78 BPM | WEIGHT: 218 LBS | OXYGEN SATURATION: 97 % | DIASTOLIC BLOOD PRESSURE: 62 MMHG | BODY MASS INDEX: 29.53 KG/M2 | HEIGHT: 72 IN | RESPIRATION RATE: 16 BRPM

## 2024-10-03 DIAGNOSIS — Z98.890 H/O CARDIAC RADIOFREQUENCY ABLATION: ICD-10-CM

## 2024-10-03 DIAGNOSIS — I51.89 LEFT VENTRICULAR SYSTOLIC DYSFUNCTION, NYHA CLASS 3: ICD-10-CM

## 2024-10-03 DIAGNOSIS — I42.8 NON-ISCHEMIC CARDIOMYOPATHY (HCC): ICD-10-CM

## 2024-10-03 DIAGNOSIS — I10 ESSENTIAL HYPERTENSION: ICD-10-CM

## 2024-10-03 DIAGNOSIS — I10 HTN (HYPERTENSION), MALIGNANT: ICD-10-CM

## 2024-10-03 PROCEDURE — 3078F DIAST BP <80 MM HG: CPT | Performed by: INTERNAL MEDICINE

## 2024-10-03 PROCEDURE — G2211 COMPLEX E/M VISIT ADD ON: HCPCS | Performed by: INTERNAL MEDICINE

## 2024-10-03 PROCEDURE — 99214 OFFICE O/P EST MOD 30 MIN: CPT | Performed by: INTERNAL MEDICINE

## 2024-10-03 PROCEDURE — 99212 OFFICE O/P EST SF 10 MIN: CPT | Performed by: INTERNAL MEDICINE

## 2024-10-03 PROCEDURE — 3075F SYST BP GE 130 - 139MM HG: CPT | Performed by: INTERNAL MEDICINE

## 2024-10-03 RX ORDER — DILTIAZEM HYDROCHLORIDE 240 MG/1
240 CAPSULE, COATED, EXTENDED RELEASE ORAL DAILY
COMMUNITY
End: 2024-10-15 | Stop reason: SDUPTHER

## 2024-10-03 ASSESSMENT — FIBROSIS 4 INDEX: FIB4 SCORE: 0.91

## 2024-10-15 ENCOUNTER — PATIENT MESSAGE (OUTPATIENT)
Dept: CARDIOLOGY | Facility: MEDICAL CENTER | Age: 59
End: 2024-10-15
Payer: COMMERCIAL

## 2024-10-15 DIAGNOSIS — I10 ESSENTIAL HYPERTENSION: ICD-10-CM

## 2024-10-15 DIAGNOSIS — I51.89 LEFT VENTRICULAR SYSTOLIC DYSFUNCTION, NYHA CLASS 3: ICD-10-CM

## 2024-10-15 RX ORDER — DILTIAZEM HYDROCHLORIDE 240 MG/1
240 CAPSULE, COATED, EXTENDED RELEASE ORAL DAILY
Qty: 90 CAPSULE | Refills: 0 | Status: SHIPPED | OUTPATIENT
Start: 2024-10-15

## 2024-11-13 ENCOUNTER — HOSPITAL ENCOUNTER (OUTPATIENT)
Dept: LAB | Facility: MEDICAL CENTER | Age: 59
End: 2024-11-13
Attending: STUDENT IN AN ORGANIZED HEALTH CARE EDUCATION/TRAINING PROGRAM
Payer: COMMERCIAL

## 2024-11-13 LAB
25(OH)D3 SERPL-MCNC: 32 NG/ML (ref 30–100)
ALBUMIN SERPL BCP-MCNC: 4.5 G/DL (ref 3.2–4.9)
ALBUMIN/GLOB SERPL: 1.6 G/DL
ALP SERPL-CCNC: 68 U/L (ref 30–99)
ALT SERPL-CCNC: 21 U/L (ref 2–50)
ANION GAP SERPL CALC-SCNC: 10 MMOL/L (ref 7–16)
AST SERPL-CCNC: 24 U/L (ref 12–45)
BASOPHILS # BLD AUTO: 0.6 % (ref 0–1.8)
BASOPHILS # BLD: 0.04 K/UL (ref 0–0.12)
BILIRUB SERPL-MCNC: 0.3 MG/DL (ref 0.1–1.5)
BUN SERPL-MCNC: 24 MG/DL (ref 8–22)
CALCIUM ALBUM COR SERPL-MCNC: 9.5 MG/DL (ref 8.5–10.5)
CALCIUM SERPL-MCNC: 9.9 MG/DL (ref 8.5–10.5)
CHLORIDE SERPL-SCNC: 103 MMOL/L (ref 96–112)
CHOLEST SERPL-MCNC: 116 MG/DL (ref 100–199)
CO2 SERPL-SCNC: 26 MMOL/L (ref 20–33)
CREAT SERPL-MCNC: 0.98 MG/DL (ref 0.5–1.4)
EOSINOPHIL # BLD AUTO: 0.14 K/UL (ref 0–0.51)
EOSINOPHIL NFR BLD: 2 % (ref 0–6.9)
ERYTHROCYTE [DISTWIDTH] IN BLOOD BY AUTOMATED COUNT: 45.1 FL (ref 35.9–50)
EST. AVERAGE GLUCOSE BLD GHB EST-MCNC: 137 MG/DL
FASTING STATUS PATIENT QL REPORTED: NORMAL
GFR SERPLBLD CREATININE-BSD FMLA CKD-EPI: 89 ML/MIN/1.73 M 2
GLOBULIN SER CALC-MCNC: 2.9 G/DL (ref 1.9–3.5)
GLUCOSE SERPL-MCNC: 133 MG/DL (ref 65–99)
HBA1C MFR BLD: 6.4 % (ref 4–5.6)
HCT VFR BLD AUTO: 47.5 % (ref 42–52)
HDLC SERPL-MCNC: 61 MG/DL
HGB BLD-MCNC: 16.2 G/DL (ref 14–18)
IMM GRANULOCYTES # BLD AUTO: 0.03 K/UL (ref 0–0.11)
IMM GRANULOCYTES NFR BLD AUTO: 0.4 % (ref 0–0.9)
LDLC SERPL CALC-MCNC: 43 MG/DL
LYMPHOCYTES # BLD AUTO: 1.47 K/UL (ref 1–4.8)
LYMPHOCYTES NFR BLD: 21.2 % (ref 22–41)
MCH RBC QN AUTO: 32.4 PG (ref 27–33)
MCHC RBC AUTO-ENTMCNC: 34.1 G/DL (ref 32.3–36.5)
MCV RBC AUTO: 95 FL (ref 81.4–97.8)
MONOCYTES # BLD AUTO: 0.53 K/UL (ref 0–0.85)
MONOCYTES NFR BLD AUTO: 7.6 % (ref 0–13.4)
NEUTROPHILS # BLD AUTO: 4.73 K/UL (ref 1.82–7.42)
NEUTROPHILS NFR BLD: 68.2 % (ref 44–72)
NRBC # BLD AUTO: 0 K/UL
NRBC BLD-RTO: 0 /100 WBC (ref 0–0.2)
PLATELET # BLD AUTO: 188 K/UL (ref 164–446)
PMV BLD AUTO: 11 FL (ref 9–12.9)
POTASSIUM SERPL-SCNC: 4.4 MMOL/L (ref 3.6–5.5)
PROT SERPL-MCNC: 7.4 G/DL (ref 6–8.2)
RBC # BLD AUTO: 5 M/UL (ref 4.7–6.1)
SODIUM SERPL-SCNC: 139 MMOL/L (ref 135–145)
TRIGL SERPL-MCNC: 60 MG/DL (ref 0–149)
WBC # BLD AUTO: 6.9 K/UL (ref 4.8–10.8)

## 2024-11-13 PROCEDURE — 85025 COMPLETE CBC W/AUTO DIFF WBC: CPT

## 2024-11-13 PROCEDURE — 80053 COMPREHEN METABOLIC PANEL: CPT

## 2024-11-13 PROCEDURE — 80061 LIPID PANEL: CPT

## 2024-11-13 PROCEDURE — 83036 HEMOGLOBIN GLYCOSYLATED A1C: CPT

## 2024-11-13 PROCEDURE — 36415 COLL VENOUS BLD VENIPUNCTURE: CPT

## 2024-11-13 PROCEDURE — 82306 VITAMIN D 25 HYDROXY: CPT

## 2024-12-02 ENCOUNTER — HOSPITAL ENCOUNTER (OUTPATIENT)
Dept: CARDIOLOGY | Facility: MEDICAL CENTER | Age: 59
End: 2024-12-02
Attending: INTERNAL MEDICINE
Payer: COMMERCIAL

## 2024-12-02 DIAGNOSIS — I10 ESSENTIAL HYPERTENSION: ICD-10-CM

## 2024-12-02 DIAGNOSIS — I42.8 NON-ISCHEMIC CARDIOMYOPATHY (HCC): ICD-10-CM

## 2024-12-02 PROCEDURE — 93306 TTE W/DOPPLER COMPLETE: CPT

## 2024-12-04 LAB
LV EJECT FRACT  99904: 60
LV EJECT FRACT MOD 2C 99903: 48.42
LV EJECT FRACT MOD 4C 99902: 60.79
LV EJECT FRACT MOD BP 99901: 50.32

## 2024-12-04 PROCEDURE — 93306 TTE W/DOPPLER COMPLETE: CPT | Mod: 26 | Performed by: INTERNAL MEDICINE

## 2025-01-09 ENCOUNTER — PATIENT MESSAGE (OUTPATIENT)
Dept: CARDIOLOGY | Facility: MEDICAL CENTER | Age: 60
End: 2025-01-09
Payer: COMMERCIAL

## 2025-01-09 DIAGNOSIS — I51.89 LEFT VENTRICULAR SYSTOLIC DYSFUNCTION, NYHA CLASS 3: ICD-10-CM

## 2025-01-09 DIAGNOSIS — I10 ESSENTIAL HYPERTENSION: ICD-10-CM

## 2025-01-13 RX ORDER — DILTIAZEM HYDROCHLORIDE 240 MG/1
240 CAPSULE, COATED, EXTENDED RELEASE ORAL DAILY
Qty: 90 CAPSULE | Refills: 3 | Status: SHIPPED | OUTPATIENT
Start: 2025-01-13

## 2025-01-13 NOTE — PATIENT COMMUNICATION
Noted:  Ottoniel Sebastian M.D.  You3 days ago       Heart function remains normal.  No changes he can follow-up with his primary doctor for further titration of his blood pressure should he have issues with it.  I would like him to continue his current medical therapy as long as his blood pressure is well-controlled.

## 2025-04-17 ENCOUNTER — HOSPITAL ENCOUNTER (OUTPATIENT)
Dept: LAB | Facility: MEDICAL CENTER | Age: 60
End: 2025-04-17
Attending: STUDENT IN AN ORGANIZED HEALTH CARE EDUCATION/TRAINING PROGRAM
Payer: COMMERCIAL

## 2025-04-17 LAB
25(OH)D3 SERPL-MCNC: 28 NG/ML (ref 30–100)
ALBUMIN SERPL BCP-MCNC: 4.6 G/DL (ref 3.2–4.9)
ALBUMIN/GLOB SERPL: 1.6 G/DL
ALP SERPL-CCNC: 75 U/L (ref 30–99)
ALT SERPL-CCNC: 39 U/L (ref 2–50)
ANION GAP SERPL CALC-SCNC: 12 MMOL/L (ref 7–16)
AST SERPL-CCNC: 25 U/L (ref 12–45)
BASOPHILS # BLD AUTO: 0.6 % (ref 0–1.8)
BASOPHILS # BLD: 0.04 K/UL (ref 0–0.12)
BILIRUB SERPL-MCNC: 0.4 MG/DL (ref 0.1–1.5)
BUN SERPL-MCNC: 22 MG/DL (ref 8–22)
CALCIUM ALBUM COR SERPL-MCNC: 9.1 MG/DL (ref 8.5–10.5)
CALCIUM SERPL-MCNC: 9.6 MG/DL (ref 8.5–10.5)
CHLORIDE SERPL-SCNC: 103 MMOL/L (ref 96–112)
CHOLEST SERPL-MCNC: 117 MG/DL (ref 100–199)
CO2 SERPL-SCNC: 24 MMOL/L (ref 20–33)
CREAT SERPL-MCNC: 1.11 MG/DL (ref 0.5–1.4)
EOSINOPHIL # BLD AUTO: 0.1 K/UL (ref 0–0.51)
EOSINOPHIL NFR BLD: 1.5 % (ref 0–6.9)
ERYTHROCYTE [DISTWIDTH] IN BLOOD BY AUTOMATED COUNT: 42.4 FL (ref 35.9–50)
EST. AVERAGE GLUCOSE BLD GHB EST-MCNC: 157 MG/DL
FASTING STATUS PATIENT QL REPORTED: NORMAL
GFR SERPLBLD CREATININE-BSD FMLA CKD-EPI: 76 ML/MIN/1.73 M 2
GLOBULIN SER CALC-MCNC: 2.8 G/DL (ref 1.9–3.5)
GLUCOSE SERPL-MCNC: 112 MG/DL (ref 65–99)
HBA1C MFR BLD: 7.1 % (ref 4–5.6)
HCT VFR BLD AUTO: 44.5 % (ref 42–52)
HDLC SERPL-MCNC: 64 MG/DL
HGB BLD-MCNC: 15.2 G/DL (ref 14–18)
IMM GRANULOCYTES # BLD AUTO: 0.03 K/UL (ref 0–0.11)
IMM GRANULOCYTES NFR BLD AUTO: 0.5 % (ref 0–0.9)
LDLC SERPL CALC-MCNC: 44 MG/DL
LYMPHOCYTES # BLD AUTO: 1.69 K/UL (ref 1–4.8)
LYMPHOCYTES NFR BLD: 25.5 % (ref 22–41)
MCH RBC QN AUTO: 31.9 PG (ref 27–33)
MCHC RBC AUTO-ENTMCNC: 34.2 G/DL (ref 32.3–36.5)
MCV RBC AUTO: 93.3 FL (ref 81.4–97.8)
MONOCYTES # BLD AUTO: 0.53 K/UL (ref 0–0.85)
MONOCYTES NFR BLD AUTO: 8 % (ref 0–13.4)
NEUTROPHILS # BLD AUTO: 4.25 K/UL (ref 1.82–7.42)
NEUTROPHILS NFR BLD: 63.9 % (ref 44–72)
NRBC # BLD AUTO: 0 K/UL
NRBC BLD-RTO: 0 /100 WBC (ref 0–0.2)
PLATELET # BLD AUTO: 222 K/UL (ref 164–446)
PMV BLD AUTO: 10 FL (ref 9–12.9)
POTASSIUM SERPL-SCNC: 4.4 MMOL/L (ref 3.6–5.5)
PROT SERPL-MCNC: 7.4 G/DL (ref 6–8.2)
RBC # BLD AUTO: 4.77 M/UL (ref 4.7–6.1)
SODIUM SERPL-SCNC: 139 MMOL/L (ref 135–145)
TRIGL SERPL-MCNC: 47 MG/DL (ref 0–149)
WBC # BLD AUTO: 6.6 K/UL (ref 4.8–10.8)

## 2025-04-17 PROCEDURE — 85025 COMPLETE CBC W/AUTO DIFF WBC: CPT

## 2025-04-17 PROCEDURE — 36415 COLL VENOUS BLD VENIPUNCTURE: CPT

## 2025-04-17 PROCEDURE — 80061 LIPID PANEL: CPT

## 2025-04-17 PROCEDURE — 80053 COMPREHEN METABOLIC PANEL: CPT

## 2025-04-17 PROCEDURE — 83036 HEMOGLOBIN GLYCOSYLATED A1C: CPT

## 2025-04-17 PROCEDURE — 82306 VITAMIN D 25 HYDROXY: CPT

## 2025-04-25 DIAGNOSIS — I50.20 ACC/AHA STAGE C SYSTOLIC HEART FAILURE (HCC): ICD-10-CM

## 2025-04-25 RX ORDER — DAPAGLIFLOZIN 10 MG/1
10 TABLET, FILM COATED ORAL
Qty: 90 TABLET | Refills: 2 | Status: SHIPPED
Start: 2025-04-25

## 2025-04-25 NOTE — TELEPHONE ENCOUNTER
Is the patient due for a refill? Yes    Was the patient seen the last 15 months? Yes    Date of last office visit: 10/3/2024    Does the patient have an upcoming appointment?  No    Provider to refill:TW    Does the patient have senior living Plus and need 100-day supply? (This applies to ALL medications) Patient does not have SCP

## 2025-04-28 ENCOUNTER — TELEPHONE (OUTPATIENT)
Dept: CARDIOLOGY | Facility: MEDICAL CENTER | Age: 60
End: 2025-04-28
Payer: COMMERCIAL

## 2025-04-28 NOTE — TELEPHONE ENCOUNTER
Patient Phone Number: [phone]  Medication: dapagliflozin propanediol (FARXIGA) 10 MG Tab (Quantity 90, Day Supply 90)  Copay: $120  Additional information/consent to FA: Notified patient of their copay. Patient gave verbal consent to obtain FA through "[employer]. Patient does not have Government sponsored insurance.    Medication: dapagliflozin propanediol (FARXIGA) 10 MG Tab  Type of Insurance: Commercial  Type of Financial assistance requested Copay Card  Source: [employer]  Source Phone #: [phone]  Outcome: Approved  Effective dates: 04/28/25 until 12/31/25  Details/Billing Information:   [id number]  PCN: [id number]  GRP: [id number]  ID: [id number]  Max Award Amount: $175 per month  Final Copay: $0    Pt wants to fill at [place] [address] Called [facility] to relay information and sent a [employer] message to pt as well

## 2025-04-28 NOTE — TELEPHONE ENCOUNTER
Received Refill PA request via [employer]  for dapagliflozin propanediol (FARXIGA) 10 MG Tab . (Quantity:90, Day Supply:90)     Insurance: [employer] health  Member ID:  [id number]  BIN: [id number]  PCN: [id number]  Group: [id number]     Ran Test claim via [place] [facility] medication Pays for a 120 copay. Will outreach to patient to offer specialty pharmacy services and or release to preferred pharmacy    Called pt to offer copay card